# Patient Record
Sex: FEMALE | Race: WHITE | NOT HISPANIC OR LATINO | Employment: PART TIME | URBAN - METROPOLITAN AREA
[De-identification: names, ages, dates, MRNs, and addresses within clinical notes are randomized per-mention and may not be internally consistent; named-entity substitution may affect disease eponyms.]

---

## 2017-09-07 ENCOUNTER — HOSPITAL ENCOUNTER (EMERGENCY)
Facility: CLINIC | Age: 57
Discharge: HOME | End: 2017-09-07
Attending: PHYSICIAN ASSISTANT | Admitting: PHYSICIAN ASSISTANT
Payer: COMMERCIAL

## 2017-09-07 VITALS
DIASTOLIC BLOOD PRESSURE: 88 MMHG | OXYGEN SATURATION: 99 % | TEMPERATURE: 98.4 F | SYSTOLIC BLOOD PRESSURE: 177 MMHG | HEART RATE: 83 BPM | RESPIRATION RATE: 16 BRPM

## 2017-09-07 DIAGNOSIS — M62.830 BACK MUSCLE SPASM: ICD-10-CM

## 2017-09-07 DIAGNOSIS — R03.0 ELEVATED BLOOD PRESSURE READING WITHOUT DIAGNOSIS OF HYPERTENSION: ICD-10-CM

## 2017-09-07 PROCEDURE — 99283 EMERGENCY DEPT VISIT LOW MDM: CPT

## 2017-09-07 PROCEDURE — 25000132 ZZH RX MED GY IP 250 OP 250 PS 637: Performed by: PHYSICIAN ASSISTANT

## 2017-09-07 RX ORDER — OXYCODONE AND ACETAMINOPHEN 5; 325 MG/1; MG/1
2 TABLET ORAL ONCE
Status: COMPLETED | OUTPATIENT
Start: 2017-09-07 | End: 2017-09-07

## 2017-09-07 RX ORDER — OXYCODONE AND ACETAMINOPHEN 5; 325 MG/1; MG/1
1-2 TABLET ORAL EVERY 4 HOURS PRN
Qty: 15 TABLET | Refills: 0 | Status: SHIPPED | OUTPATIENT
Start: 2017-09-07 | End: 2019-02-20

## 2017-09-07 RX ORDER — METHOCARBAMOL 500 MG/1
1000 TABLET, FILM COATED ORAL 4 TIMES DAILY PRN
Qty: 40 TABLET | Refills: 0 | Status: SHIPPED | OUTPATIENT
Start: 2017-09-07 | End: 2017-09-12

## 2017-09-07 RX ORDER — METHOCARBAMOL 500 MG/1
1000 TABLET, FILM COATED ORAL ONCE
Status: COMPLETED | OUTPATIENT
Start: 2017-09-07 | End: 2017-09-07

## 2017-09-07 RX ADMIN — METHOCARBAMOL 1000 MG: 500 TABLET ORAL at 13:42

## 2017-09-07 RX ADMIN — OXYCODONE HYDROCHLORIDE AND ACETAMINOPHEN 2 TABLET: 5; 325 TABLET ORAL at 13:43

## 2017-09-07 ASSESSMENT — ENCOUNTER SYMPTOMS
BACK PAIN: 1
WEAKNESS: 0
NUMBNESS: 1

## 2017-09-07 NOTE — DISCHARGE INSTRUCTIONS
[facility] [place]  [facility]  [address]  [place] [address]  Phone: [phone]      Relieving Back Pain  Back pain is a common problem. You can strain back muscles by lifting too much weight or just by moving the wrong way. Back strain can be uncomfortable, even painful. And it can take weeks or months to improve. To help yourself feel better and prevent future back strains, try these tips.  Important Note: Do not give aspirin to children or teens without first discussing it with your healthcare provider.      ? Ice    Ice reduces muscle pain and swelling. It helps most during the first 24 to 48 hours after an injury.    Wrap an ice pack or a bag of frozen peas in a thin towel. (Never place ice directly on your skin.)    Place the ice where your back hurts the most.    Don t ice for more than 20 minutes at a time.    You can use ice several times a day.  ? Medicines  Over-the-counter pain relievers can include acetaminophen and anti-inflammatory medicines, which includes aspirin or ibuprofen. They can help ease discomfort. Some also reduce swelling.    Tell your healthcare provider about any medicines you are already taking.    Take medicines only as directed.  ? Heat  After the first 48 hours, heat can relax sore muscles and improve blood flow.    Try a warm bath or shower. Or use a heating pad set on low. To prevent a burn, keep a cloth between you and the heating pad.    Don t use a heating pad for more than 15 minutes at a time. Never sleep on a heating pad.  Date Last Reviewed: 9/1/2015 2000-2017 [employer]. [address] [place] [address], [place] [address]. All rights reserved. This information is not intended as a substitute for professional medical care. Always follow your healthcare professional's instructions.          Back Spasm (No Trauma)    Spasm of the back muscles can occur after a sudden forceful twisting or  bending force (such as in a car accident), after a simple awkward movement, or after lifting something heavy with poor body positioning. In any case, muscle spasm adds to the pain. Sleeping in an awkward position or on a poor quality mattress can also cause this. Some people respond to emotional stress by tensing the muscles of their back.  Pain that continues may need further evaluation or other types of treatment such as physical therapy.  You don't always need X-rays for the initial evaluation of back pain, unless you had a physical injury such as from a car accident or fall. If your pain continues and doesn't respond to medical treatment, X-rays and other tests may then be done.   Home care    As soon as possible, start sitting or walking again to avoid problems from prolonged bed rest (muscle weakness, worsening back stiffness and pain, blood clots in the legs).    When in bed, try to find a position of comfort. A firm mattress is best. Try lying flat on your back with pillows under your knees. You can also try lying on your side with your knees bent up toward your chest and a pillow between your knees.    Avoid prolonged sitting, long car rides, or travel. This puts more stress on the lower back than standing or walking.     During the first 24 to 72 hours after an injury or flare-up, apply an ice pack to the painful area for 20 minutes, then remove it for 20 minutes. Do this over a period of 60 to 90 minutes or several times a day. This will reduce swelling and pain. Always wrap ice packs in a thin towel.    You can start with ice, then switch to heat. Heat (hot shower, hot bath, or heating pad) reduces pain, and works well for muscle spasms. Apply heat to the painful area for 20 minutes, then remove it for 20 minutes. Do this over a period of 60 to 90 minutes or several times a day. Do not sleep on a heating pad as it can burn or damage skin.    Alternate ice and heat therapies.    Be aware of safe lifting  methods and do not lift anything over 15 pounds until all the pain is gone.  Gentle stretching will help your back heal faster. Do this simple routine 2 to 3 times a day until your back is feeling better.    Lie on your back with your knees bent and both feet on the ground    Slowly raise your left knee to your chest as you flatten your lower back against the floor. Hold for 20 to 30 seconds.    Relax and repeat the exercise with your right knee.    Do 2 to 3 of these exercises for each leg.    Repeat, hugging both knees to your chest at the same time.    Do not bounce, but use a gentle pull.  Medicines  Talk to your doctor before using medicine, especially if you have other medical problems or are taking other medicines.  You may use acetaminophen or ibuprofen to control pain, unless your healthcare provider prescribed another pain medicine. If you have a chronic condition such as diabetes, liver or kidney disease, stomach ulcer, or gastrointestinal bleeding, or are taking blood thinners, talk with your healthcare provider before taking any medicines.  Be careful if you are given prescription pain medicine, narcotics, or medicine for muscle spasm. They can cause drowsiness, affect your coordination, reflexes, or judgment. Do not drive or operate heavy machinery when taking these medicines. Take pain medicine only as prescribed by your healthcare provider.  Follow-up care  Follow up with your doctor, or as advised. Physical therapy or further tests may be needed.  If X-rays were taken, they may be reviewed by a radiologist. You will be notified of any new findings that may affect your care.  Call 911  Seek emergency medical care if any of these occur:    Trouble breathing    Confusion    Drowsiness or trouble awakening    Fainting or loss of consciousness    Rapid or very slow heart rate    Loss of bowel or bladder control  When to seek medical advice  Call your healthcare provider right away if any of these  occur:    Pain becomes worse or spreads to your legs    Weakness or numbness in one or both legs    Numbness in the groin or genital area    Unexplained fever over 100.4 F (38.0 C)    Burning or pain when passing urine  Date Last Reviewed: 6/1/2016 2000-2017 [employer]. [address] [place] [address]. All rights reserved. This information is not intended as a substitute for professional medical care. Always follow your healthcare professional's instructions.

## 2017-09-07 NOTE — ED NOTES
"Back pain. States has a history of the same. No known injury today. Has been intermittently worse over the past week. Left leg does have numbness and tingling \"it goes away right away though if I stand still\". Denies loss of bowel or bladder.   "

## 2017-09-07 NOTE — ED AVS SNAPSHOT
[facility]    [address] [facility] [address]    Phone:  [phone]    Fax:  [phone]                                       Ramila Dietrich   MRN: [id number]    Department:  [facility]   Date of Visit:  9/7/2017           Patient Information     Date Of Birth          1960        Your diagnoses for this visit were:     Back muscle spasm        You were seen by Kelly Franz PA-C.      Follow-up Information     Follow up with Ivonne Crowell.    Contact information:    [address] [place] [address]  [phone]          Follow up with [facility] In 3 days.    Specialties:  Sports Medicine, Pain Management, Obstetrics & Gynecology, Pediatrics, Internal Medicine, Nephrology    Why:  As needed    Contact information:    [address]  [facility] [id number] [phone]        Follow up with [facility].    Specialty:  EMERGENCY MEDICINE    Why:  If symptoms worsen    Contact information:    [address] [facility] [id number] [phone]        Discharge Instructions       [facility]  [address]  [place] [address]  Phone: [phone]      Relieving Back Pain  Back pain is a common problem. You can strain back muscles by lifting too much weight or just by moving the wrong way. Back strain can be uncomfortable, even painful. And it can take weeks or months to improve. To help yourself feel better and prevent future back strains, try these tips.  Important Note: Do not give aspirin to children or teens without first discussing it with your healthcare provider.      ? Ice    Ice reduces muscle pain and swelling. It helps most during the first 24 to 48 hours after an injury.    Wrap an ice  pack or a bag of frozen peas in a thin towel. (Never place ice directly on your skin.)    Place the ice where your back hurts the most.    Don t ice for more than 20 minutes at a time.    You can use ice several times a day.  ? Medicines  Over-the-counter pain relievers can include acetaminophen and anti-inflammatory medicines, which includes aspirin or ibuprofen. They can help ease discomfort. Some also reduce swelling.    Tell your healthcare provider about any medicines you are already taking.    Take medicines only as directed.  ? Heat  After the first 48 hours, heat can relax sore muscles and improve blood flow.    Try a warm bath or shower. Or use a heating pad set on low. To prevent a burn, keep a cloth between you and the heating pad.    Don t use a heating pad for more than 15 minutes at a time. Never sleep on a heating pad.  Date Last Reviewed: 9/1/2015 2000-2017 [employer]. [address] [place] [address]. All rights reserved. This information is not intended as a substitute for professional medical care. Always follow your healthcare professional's instructions.          Back Spasm (No Trauma)    Spasm of the back muscles can occur after a sudden forceful twisting or bending force (such as in a car accident), after a simple awkward movement, or after lifting something heavy with poor body positioning. In any case, muscle spasm adds to the pain. Sleeping in an awkward position or on a poor quality mattress can also cause this. Some people respond to emotional stress by tensing the muscles of their back.  Pain that continues may need further evaluation or other types of treatment such as physical therapy.  You don't always need X-rays for the initial evaluation of back pain, unless you had a physical injury such as from a car accident or fall. If your pain continues and doesn't respond to medical treatment, X-rays and other tests may then be done.   Home care    As soon as  possible, start sitting or walking again to avoid problems from prolonged bed rest (muscle weakness, worsening back stiffness and pain, blood clots in the legs).    When in bed, try to find a position of comfort. A firm mattress is best. Try lying flat on your back with pillows under your knees. You can also try lying on your side with your knees bent up toward your chest and a pillow between your knees.    Avoid prolonged sitting, long car rides, or travel. This puts more stress on the lower back than standing or walking.     During the first 24 to 72 hours after an injury or flare-up, apply an ice pack to the painful area for 20 minutes, then remove it for 20 minutes. Do this over a period of 60 to 90 minutes or several times a day. This will reduce swelling and pain. Always wrap ice packs in a thin towel.    You can start with ice, then switch to heat. Heat (hot shower, hot bath, or heating pad) reduces pain, and works well for muscle spasms. Apply heat to the painful area for 20 minutes, then remove it for 20 minutes. Do this over a period of 60 to 90 minutes or several times a day. Do not sleep on a heating pad as it can burn or damage skin.    Alternate ice and heat therapies.    Be aware of safe lifting methods and do not lift anything over 15 pounds until all the pain is gone.  Gentle stretching will help your back heal faster. Do this simple routine 2 to 3 times a day until your back is feeling better.    Lie on your back with your knees bent and both feet on the ground    Slowly raise your left knee to your chest as you flatten your lower back against the floor. Hold for 20 to 30 seconds.    Relax and repeat the exercise with your right knee.    Do 2 to 3 of these exercises for each leg.    Repeat, hugging both knees to your chest at the same time.    Do not bounce, but use a gentle pull.  Medicines  Talk to your doctor before using medicine, especially if you have other medical problems or are taking  other medicines.  You may use acetaminophen or ibuprofen to control pain, unless your healthcare provider prescribed another pain medicine. If you have a chronic condition such as diabetes, liver or kidney disease, stomach ulcer, or gastrointestinal bleeding, or are taking blood thinners, talk with your healthcare provider before taking any medicines.  Be careful if you are given prescription pain medicine, narcotics, or medicine for muscle spasm. They can cause drowsiness, affect your coordination, reflexes, or judgment. Do not drive or operate heavy machinery when taking these medicines. Take pain medicine only as prescribed by your healthcare provider.  Follow-up care  Follow up with your doctor, or as advised. Physical therapy or further tests may be needed.  If X-rays were taken, they may be reviewed by a radiologist. You will be notified of any new findings that may affect your care.  Call 911  Seek emergency medical care if any of these occur:    Trouble breathing    Confusion    Drowsiness or trouble awakening    Fainting or loss of consciousness    Rapid or very slow heart rate    Loss of bowel or bladder control  When to seek medical advice  Call your healthcare provider right away if any of these occur:    Pain becomes worse or spreads to your legs    Weakness or numbness in one or both legs    Numbness in the groin or genital area    Unexplained fever over 100.4 F (38.0 C)    Burning or pain when passing urine  Date Last Reviewed: 6/1/2016 2000-2017 [employer]. [address] [place], [place] [address]. All rights reserved. This information is not intended as a substitute for professional medical care. Always follow your healthcare professional's instructions.          24 Hour Appointment Hotline       To make an appointment at any [facility] clinic, call [phone] ([phone]). If you don't have a family doctor or clinic, we will help you find one. [facility] are  conveniently located to serve the needs of you and your family.             Review of your medicines      START taking        Dose / Directions Last dose taken    methocarbamol 500 MG tablet   Commonly known as:  ROBAXIN   Dose:  1000 mg   Quantity:  40 tablet        Take 2 tablets (1,000 mg) by mouth 4 times daily as needed for muscle spasms   Refills:  0        oxyCODONE-acetaminophen 5-325 MG per tablet   Commonly known as:  PERCOCET   Dose:  1-2 tablet   Quantity:  15 tablet        Take 1-2 tablets by mouth every 4 hours as needed for pain   Refills:  0          Our records show that you are taking the medicines listed below. If these are incorrect, please call your family doctor or clinic.        Dose / Directions Last dose taken    acetaminophen 500 MG tablet   Commonly known as:  TYLENOL   Dose:  1000 mg        Take 1,000 mg by mouth every 4 hours as needed for pain   Refills:  0        ADVIL 200 MG tablet   Dose:  400 mg   Generic drug:  ibuprofen        Take 400 mg by mouth once as needed for pain   Refills:  0        sucralfate 1 GM/10ML suspension   Commonly known as:  CARAFATE   Dose:  1 g   Quantity:  420 mL        Take 10 mLs (1 g) by mouth 4 times daily   Refills:  0        ZANTAC PO        Refills:  0                Prescriptions were sent or printed at these locations (2 Prescriptions)                   Other Prescriptions                Printed at Department/Unit printer (2 of 2)         oxyCODONE-acetaminophen (PERCOCET) 5-325 MG per tablet               methocarbamol (ROBAXIN) 500 MG tablet                Orders Needing Specimen Collection     None      Pending Results     No orders found from 9/5/2017 to 9/8/2017.            Pending Culture Results     No orders found from 9/5/2017 to 9/8/2017.            Pending Results Instructions     If you had any lab results that were not finalized at the time of your Discharge, you can call the ED Lab Result RN at [phone]. You will be contacted by  this team for any positive Lab results or changes in treatment. The nurses are available 7 days a week from 10A to 6:30P.  You can leave a message 24 hours per day and they will return your call.        Test Results From Your Hospital Stay               Clinical Quality Measure: Blood Pressure Screening     Your blood pressure was checked while you were in the emergency department today. The last reading we obtained was  BP: (!) 149/97 . Please read the guidelines below about what these numbers mean and what you should do about them.  If your systolic blood pressure (the top number) is less than 120 and your diastolic blood pressure (the bottom number) is less than 80, then your blood pressure is normal. There is nothing more that you need to do about it.  If your systolic blood pressure (the top number) is 120-139 or your diastolic blood pressure (the bottom number) is 80-89, your blood pressure may be higher than it should be. You should have your blood pressure rechecked within a year by a primary care provider.  If your systolic blood pressure (the top number) is 140 or greater or your diastolic blood pressure (the bottom number) is 90 or greater, you may have high blood pressure. High blood pressure is treatable, but if left untreated over time it can put you at risk for heart attack, stroke, or kidney failure. You should have your blood pressure rechecked by a primary care provider within the next 4 weeks.  If your provider in the emergency department today gave you specific instructions to follow-up with your doctor or provider even sooner than that, you should follow that instruction and not wait for up to 4 weeks for your follow-up visit.        Thank you for choosing [facility]       Thank you for choosing [facility] for your care. Our goal is always to provide you with excellent care. Hearing back from our patients is one way we can continue to improve our services. Please take a few minutes to complete the  "written survey that you may receive in the mail after you visit with us. Thank you!        [facility] Information     [employer] lets you send messages to your doctor, view your test results, renew your prescriptions, schedule appointments and more. To sign up, go to [email or web address] . Click on \"Log in\" on the left side of the screen, which will take you to the Welcome page. Then click on \"Sign up Now\" on the right side of the page.     You will be asked to enter the access code listed below, as well as some personal information. Please follow the directions to create your username and password.     Your access code is: [email or web address]  Expires: 2017  1:42 PM     Your access code will  in 90 days. If you need help or a new code, please call your [facility] clinic or [phone].        Care EveryWhere ID     This is your Care EveryWhere ID. This could be used by other organizations to access your [facility] medical records  [id number]        Equal Access to Services     ÁLVARO SCHAEFER : Hadbranden padgett Sofrank, waaxda lucastroadaha, qaybta kaalwade canales, tonie reynaga . So [facility] [phone].    ATENCIÓN: Si habla español, tiene a gamino disposición servicios gratuitos de asistencia lingüística. Llame al [phone].    We comply with applicable federal civil rights laws and [place] laws. We do not discriminate on the basis of race, color, national origin, age, disability sex, sexual orientation or gender identity.            After Visit Summary       This is your record. Keep this with you and show to your community pharmacist(s) and doctor(s) at your next visit.                  "

## 2017-09-07 NOTE — ED AVS SNAPSHOT
[facility]    [address]    [facility] [address]    Phone:  [phone]    Fax:  [phone]                                       Ramila Dietrich   MRN: [id number]    Department:  [facility]   Date of Visit:  9/7/2017           After Visit Summary Signature Page     I have received my discharge instructions, and my questions have been answered. I have discussed any challenges I see with this plan with the nurse or doctor.    ..........................................................................................................................................  Patient/Patient Representative Signature      ..........................................................................................................................................  Patient Representative Print Name and Relationship to Patient    ..................................................               ................................................  Date                                            Time    ..........................................................................................................................................  Reviewed by Signature/Title    ...................................................              ..............................................  Date                                                            Time

## 2017-09-07 NOTE — ED PROVIDER NOTES
History     Chief Complaint:  Back Pain    HPI   Ramila Dietrich is a 57 year old female, with a history of herniated disc, who presents with her  to the ED for evaluation of back pain. The patient reports she can take Aleve when she has an episode of back pain, and the pain would be relieved. However, the patient notes taking Motrin at 9:00AM and Aleve at 12:00PM today without alleviation of pain, prompting her visit to the ED. The patient notes her pain is unbearable with all movements and she has trouble finding a comfortable position. The patient reports her pain radiates down into her left thigh occasionally causing a numbness and tingling sensation. This is the same pain she has had in the past although this is a bit higher up than it was before (at that time it was in her lower back). The patient rates the pain as a 10/10. The patient denies any recent trauma, gait problems, weakness, or urinary/bladder incontinence. Of note, the patient reports never seeing a specialist for her herniated disc although would like to.    CT Lumbar Spine w/o Contrast, 10/20/16  IMPRESSION: L4-L5 left foraminal/lateral recess disc protrusion or  herniation resulting in moderate left L4 foraminal stenosis and  asymmetrical narrowing of the left lateral recess which could also  result in compression of the descending left L5 nerve root.    Kalpesh Ovalle MD    Allergies:  Aminophylline  Erythromycin    Medications:    RaNITidine HCl (ZANTAC PO)   sucralfate (CARAFATE) 1 GM/10ML suspension   ibuprofen (ADVIL) 200 MG tablet   acetaminophen (TYLENOL) 500 MG tablet     Past Medical History:    Gastroenteritis  Asthma  Tobacco abuse  Hemorrhagic colitis  Acid reflux  Hiatal hernia  Herniated disc    Past Surgical History:    Appendectomy   x5  Orthopedic surgery  Tonsillectomy    Family History:    Heart disease  MI  Lung cancer  MS    Social History:  Smoking status: Current every day smoker  Alcohol use: No  Presents  to ED with boyfriend   Marital Status:  Single [1]     Review of Systems   Musculoskeletal: Positive for back pain. Negative for gait problem.   Neurological: Positive for numbness. Negative for weakness.   All other systems reviewed and are negative.    Physical Exam     Patient Vitals for the past 24 hrs:   BP Temp Temp src Heart Rate Resp SpO2   09/07/17 1318 (!) 149/97 98.4  F (36.9  C) Temporal 91 16 99 %     Physical Exam  Constitutional: Alert, attentive  CV: regular rate and rhythm  Chest: Effort normal and breath sounds normal.   GI:  There is no tenderness. No distension. Normal bowel sounds  MSK: Normal range of motion of extremities. There is mild tenderness to the mid-upper thoracic paraspinal musculature without midline spinal tenderness. No pain on palpation of the lower back musculature.   Neurological: Alert, attentive  5/5 strength to the DF, PF, EHL and FHL motor functions; sensation intact to the DP, SP, T, S and S distributions  Skin: Skin is warm and dry.  No skin changes in the areas of pain to the back.     Emergency Department Course   Interventions:  1342: Robaxin 1,000mg Oral   1343: Percocet 5-325mg 2 tablets Oral     Emergency Department Course:  Past medical records, nursing notes, and vitals reviewed.  1324: I performed an exam of the patient and obtained history, as documented above.    Findings and plan explained to the Patient and boyfriend. Patient discharged home with instructions regarding supportive care, medications, and reasons to return. The importance of close follow-up was reviewed.     Impression & Plan      Medical Decision Making:  Ramila Dietrich is a 57 year old female who presents for evaluation of back pain. She has a history of back pain in the past.  Pain has improved with interventions in the emergency department. The patient did not sustain any trauma, therefore x-rays are not necessary due to the low likelihood of fracture or subluxation.  No red flag symptoms to  suggest CT and/or MRI is indicated at this point.  There is no clinical evidence of cauda equina syndrome, discitis, spinal/epidural space hematoma or epidural abscess or other worrisome etiology. The neurological exam is normal and the patient's symptoms seem consistent with musculoskeletal issues and significant muscle spasm.   The patient will be discharged with pain medications to use as directed. Ice or heat to the back and stretching exercises. No heavy lifting, bending or twisting. Return if increasing pain, numbness, weakness, or bowel or bladder dysfunction. She was advised to schedule follow-up with her primary doctor within 3 days to re-assess symptoms and discuss elevated blood pressure which she states she has had in the past but has never seen PCP for blood pressure. Also requested spine doctor referral information which was given.     Diagnosis:    ICD-10-CM   1. Back muscle spasm M62.830     Disposition: Patient discharged to home with      Discharge Medications:  New Prescriptions    METHOCARBAMOL (ROBAXIN) 500 MG TABLET    Take 2 tablets (1,000 mg) by mouth 4 times daily as needed for muscle spasms    OXYCODONE-ACETAMINOPHEN (PERCOCET) 5-325 MG PER TABLET    Take 1-2 tablets by mouth every 4 hours as needed for pain     Venus Gonzalez  9/7/2017   [facility] EMERGENCY DEPARTMENT    I, Venus Gonzalez, am serving as a scribe at 1:24 PM on 9/7/2017 to document services personally performed by Kelly Franz PA-C based on my observations and the provider's statements to me.        Kelly Farnz PA-C  09/07/17 3716

## 2017-12-19 ENCOUNTER — HOSPITAL ENCOUNTER (EMERGENCY)
Facility: CLINIC | Age: 57
Discharge: HOME | End: 2017-12-19
Attending: EMERGENCY MEDICINE | Admitting: EMERGENCY MEDICINE
Payer: COMMERCIAL

## 2017-12-19 VITALS
DIASTOLIC BLOOD PRESSURE: 95 MMHG | WEIGHT: 100 LBS | SYSTOLIC BLOOD PRESSURE: 152 MMHG | HEART RATE: 88 BPM | RESPIRATION RATE: 16 BRPM | TEMPERATURE: 97.6 F | OXYGEN SATURATION: 97 % | BODY MASS INDEX: 18.29 KG/M2

## 2017-12-19 DIAGNOSIS — M54.50 ACUTE MIDLINE LOW BACK PAIN WITHOUT SCIATICA: ICD-10-CM

## 2017-12-19 PROCEDURE — 25000132 ZZH RX MED GY IP 250 OP 250 PS 637: Performed by: EMERGENCY MEDICINE

## 2017-12-19 PROCEDURE — 99283 EMERGENCY DEPT VISIT LOW MDM: CPT

## 2017-12-19 RX ORDER — HYDROCODONE BITARTRATE AND ACETAMINOPHEN 5; 325 MG/1; MG/1
2 TABLET ORAL ONCE
Status: COMPLETED | OUTPATIENT
Start: 2017-12-19 | End: 2017-12-19

## 2017-12-19 RX ORDER — CYCLOBENZAPRINE HCL 10 MG
10 TABLET ORAL 3 TIMES DAILY PRN
Qty: 15 TABLET | Refills: 0 | Status: SHIPPED | OUTPATIENT
Start: 2017-12-19 | End: 2019-02-20

## 2017-12-19 RX ADMIN — HYDROCODONE BITARTRATE AND ACETAMINOPHEN 2 TABLET: 5; 325 TABLET ORAL at 19:22

## 2017-12-19 ASSESSMENT — ENCOUNTER SYMPTOMS
NAUSEA: 1
BACK PAIN: 1
FEVER: 0
WEAKNESS: 0
MYALGIAS: 0
NUMBNESS: 0
NECK PAIN: 0
WOUND: 0
VOMITING: 0

## 2017-12-19 NOTE — ED AVS SNAPSHOT
[facility] Emergency Department    [address]    [facility] [address]    Phone:  [phone]    Fax:  [phone]                                       Ramila Dietrich   MRN: [id number]    Department:  [facility] Emergency Department   Date of Visit:  12/19/2017           After Visit Summary Signature Page     I have received my discharge instructions, and my questions have been answered. I have discussed any challenges I see with this plan with the nurse or doctor.    ..........................................................................................................................................  Patient/Patient Representative Signature      ..........................................................................................................................................  Patient Representative Print Name and Relationship to Patient    ..................................................               ................................................  Date                                            Time    ..........................................................................................................................................  Reviewed by Signature/Title    ...................................................              ..............................................  Date                                                            Time

## 2017-12-19 NOTE — ED AVS SNAPSHOT
[facility] Emergency Department    [address] [facility] [address]    Phone:  [phone]    Fax:  [phone]                                       Ramila Dietrich   MRN: [id number]    Department:  [facility] Emergency Department   Date of Visit:  12/19/2017           Patient Information     Date Of Birth          1960        Your diagnoses for this visit were:     Acute midline low back pain without sciatica        You were seen by Austin Miranda MD.      Follow-up Information     Follow up with Clinic, [facility]. Schedule an appointment as soon as possible for a visit in 3 days.    Contact information:    [address] [place] [address]  [phone]          Discharge Instructions       Discharge Instructions  Back Pain  You were seen today for back pain. Back pain can have many causes, but most will get better without surgery or other specific treatment. Sometimes there is a herniated ( slipped ) disc. We do not usually do MRI scans to look for these right away, since most herniated discs will get better on their own with time.  Today, we did not find any evidence that your back pain was caused by a serious condition. However, sometimes symptoms develop over time and cannot be found during an emergency visit, so it is very important that you follow up with your primary provider.  Generally, every Emergency Department visit should have a follow-up clinic visit with either a primary or a specialty clinic/provider. Please follow-up as instructed by your emergency provider today.    Return to the Emergency Department if:    You develop a fever with your back pain.     You have weakness or change in sensation in one or both legs.    You lose control of your bowels or bladder, or cannot empty your bladder (cannot pee).    Your pain gets much worse.     Follow-up with your provider:    Unless your pain has completely gone away, please  make an appointment with your provider within one week. Most of the routine care for back pain is available in a clinic and not the Emergency Department. You may need further management of your back pain, such as more pain medication, imaging such as an X-ray or MRI, or physical therapy.    What can I do to help myself?    Remain Active -- People are often afraid that they will hurt their back further or delay recovery by remaining active, but this is one of the best things you can do for your back. In fact, staying in bed for a long time to rest is not recommended. Studies have shown that people with low back pain recover faster when they remain active. Movement helps to bring blood flow to the muscles and relieve muscle spasms as well as preventing loss of muscle strength.    Heat -- Using a heating pad can help with low back pain during the first few weeks. Do not sleep with a heating pad, as you can be burned.     Pain medications - You may take a pain medication such as Tylenol  (acetaminophen), Advil , Motrin  (ibuprofen) or Aleve  (naproxen).  If you were given a prescription for medicine here today, be sure to read all of the information (including the package insert) that comes with your prescription.  This will include important information about the medicine, its side effects, and any warnings that you need to know about.  The pharmacist who fills the prescription can provide more information and answer questions you may have about the medicine.  If you have questions or concerns that the pharmacist cannot address, please call or return to the Emergency Department.   Remember that you can always come back to the Emergency Department if you are not able to see your regular provider in the amount of time listed above, if you get any new symptoms, or if there is anything that worries you.  Discharge Instructions  Hypertension - High Blood Pressure    During you visit to the Emergency Department, your blood  pressure was higher than the recommended blood pressure.  This may be related to stress, pain, medication or other temporary conditions. In these cases, your blood pressure may return to normal on its own. If you have a history of high blood pressure, you may need to have your provider adjust your medications. Sometimes, your high measurement here may indicate that you have developed high blood pressure that will stay high unless it is treated. As a general rule, high blood pressure causes problems over years rather than days, weeks, or months. So, while it is important to treat blood pressure, it is rarely important to treat blood pressure immediately. Occasionally we will begin a medication in the Emergency Department; more often we will recommend close follow-up for medications with a primary doctor/clinic.    Generally, every Emergency Department visit should have a follow-up clinic visit with either a primary or a specialty clinic/provider. Please follow-up as instructed by your emergency provider today.    Return to the Emergency Department if you start to have:    A severe headache.    Chest pain.    Shortness of breath.    Weakness or numbness that affects one part of the body.    Confusion.    Vision changes.    Significant swelling of legs and/or eyes.    A reaction to any medication started in the Emergency Department.    What can I do to help myself?    Avoid alcohol.    Take any blood pressure medicine that you are prescribed.    Get a good night s sleep.    Lower your salt intake.    Exercise.    Lose weight.    Manage stress.    See your doctor regularly    If blood pressure medication was started in the Emergency Department:    The medicine may not have an immediate effect. The body and brain determine what blood pressure you have. The medicine s job is to retrain the body s  thermostat  to a lower blood pressure.    You will need to follow up with your provider to see how this medicine is working  for you.  If you were given a prescription for medicine here today, be sure to read all of the information (including the package insert) that comes with your prescription.  This will include important information about the medicine, its side effects, and any warnings that you need to know about.  The pharmacist who fills the prescription can provide more information and answer questions you may have about the medicine.  If you have questions or concerns that the pharmacist cannot address, please call or return to the Emergency Department.   Remember that you can always come back to the Emergency Department if you are not able to see your regular provider in the amount of time listed above, if you get any new symptoms, or if there is anything that worries you.      24 Hour Appointment Hotline       To make an appointment at any [facility], call [phone] ([phone]). If you don't have a family doctor or clinic, we will help you find one. [facility] clinics are conveniently located to serve the needs of you and your family.             Review of your medicines      START taking        Dose / Directions Last dose taken    cyclobenzaprine 10 MG tablet   Commonly known as:  FLEXERIL   Dose:  10 mg   Quantity:  15 tablet        Take 1 tablet (10 mg) by mouth 3 times daily as needed for muscle spasms   Refills:  0          Our records show that you are taking the medicines listed below. If these are incorrect, please call your family doctor or clinic.        Dose / Directions Last dose taken    acetaminophen 500 MG tablet   Commonly known as:  TYLENOL   Dose:  1000 mg        Take 1,000 mg by mouth every 4 hours as needed for pain   Refills:  0        ADVIL 200 MG tablet   Dose:  400 mg   Generic drug:  ibuprofen        Take 400 mg by mouth once as needed for pain   Refills:  0        oxyCODONE-acetaminophen 5-325 MG per tablet   Commonly known as:  PERCOCET   Dose:  1-2 tablet   Quantity:  15 tablet         Take 1-2 tablets by mouth every 4 hours as needed for pain   Refills:  0        sucralfate 1 GM/10ML suspension   Commonly known as:  CARAFATE   Dose:  1 g   Quantity:  420 mL        Take 10 mLs (1 g) by mouth 4 times daily   Refills:  0        ZANTAC PO        Refills:  0                Prescriptions were sent or printed at these locations (1 Prescription)                   Other Prescriptions                Printed at Department/Unit printer (1 of 1)         cyclobenzaprine (FLEXERIL) 10 MG tablet                Orders Needing Specimen Collection     None      Pending Results     No orders found from 12/17/2017 to 12/20/2017.            Pending Culture Results     No orders found from 12/17/2017 to 12/20/2017.            Pending Results Instructions     If you had any lab results that were not finalized at the time of your Discharge, you can call the ED Lab Result RN at [phone]. You will be contacted by this team for any positive Lab results or changes in treatment. The nurses are available 7 days a week from 10A to 6:30P.  You can leave a message 24 hours per day and they will return your call.        Test Results From Your Hospital Stay               Clinical Quality Measure: Blood Pressure Screening     Your blood pressure was checked while you were in the emergency department today. The last reading we obtained was  BP: (!) 152/95 . Please read the guidelines below about what these numbers mean and what you should do about them.  If your systolic blood pressure (the top number) is less than 120 and your diastolic blood pressure (the bottom number) is less than 80, then your blood pressure is normal. There is nothing more that you need to do about it.  If your systolic blood pressure (the top number) is 120-139 or your diastolic blood pressure (the bottom number) is 80-89, your blood pressure may be higher than it should be. You should have your blood pressure rechecked within a year by a primary care  "provider.  If your systolic blood pressure (the top number) is 140 or greater or your diastolic blood pressure (the bottom number) is 90 or greater, you may have high blood pressure. High blood pressure is treatable, but if left untreated over time it can put you at risk for heart attack, stroke, or kidney failure. You should have your blood pressure rechecked by a primary care provider within the next 4 weeks.  If your provider in the emergency department today gave you specific instructions to follow-up with your doctor or provider even sooner than that, you should follow that instruction and not wait for up to 4 weeks for your follow-up visit.        Thank you for choosing [facility]       Thank you for choosing [facility] for your care. Our goal is always to provide you with excellent care. Hearing back from our patients is one way we can continue to improve our services. Please take a few minutes to complete the written survey that you may receive in the mail after you visit with us. Thank you!        [employer] Information     [email or web address] lets you send messages to your doctor, view your test results, renew your prescriptions, schedule appointments and more. To sign up, go to [email or web address] . Click on \"Log in\" on the left side of the screen, which will take you to the Welcome page. Then click on \"Sign up Now\" on the right side of the page.     You will be asked to enter the access code listed below, as well as some personal information. Please follow the directions to create your username and password.     Your access code is: [email or web address]  Expires: 3/19/2018  7:30 PM     Your access code will  in 90 days. If you need help or a new code, please call your [facility] clinic or [phone].        Care EveryWhere ID     This is your Care EveryWhere ID. This could be used by other organizations to access your [facility] medical records  [id number]        Equal Access to Services     ÁLVARO ETIENNE: Salbador nur" dayron Hoskins, bandar bowers, leobardo canales, tnoie mckeon. So [facility] [phone].    ATENCIÓN: Si habla español, tiene a gamino disposición servicios gratuitos de asistencia lingüística. Llame al [phone].    We comply with applicable federal civil rights laws and [place] laws. We do not discriminate on the basis of race, color, national origin, age, disability, sex, sexual orientation, or gender identity.            After Visit Summary       This is your record. Keep this with you and show to your community pharmacist(s) and doctor(s) at your next visit.

## 2017-12-20 NOTE — DISCHARGE INSTRUCTIONS
Discharge Instructions  Back Pain  You were seen today for back pain. Back pain can have many causes, but most will get better without surgery or other specific treatment. Sometimes there is a herniated ( slipped ) disc. We do not usually do MRI scans to look for these right away, since most herniated discs will get better on their own with time.  Today, we did not find any evidence that your back pain was caused by a serious condition. However, sometimes symptoms develop over time and cannot be found during an emergency visit, so it is very important that you follow up with your primary provider.  Generally, every Emergency Department visit should have a follow-up clinic visit with either a primary or a specialty clinic/provider. Please follow-up as instructed by your emergency provider today.    Return to the Emergency Department if:    You develop a fever with your back pain.     You have weakness or change in sensation in one or both legs.    You lose control of your bowels or bladder, or cannot empty your bladder (cannot pee).    Your pain gets much worse.     Follow-up with your provider:    Unless your pain has completely gone away, please make an appointment with your provider within one week. Most of the routine care for back pain is available in a clinic and not the Emergency Department. You may need further management of your back pain, such as more pain medication, imaging such as an X-ray or MRI, or physical therapy.    What can I do to help myself?    Remain Active -- People are often afraid that they will hurt their back further or delay recovery by remaining active, but this is one of the best things you can do for your back. In fact, staying in bed for a long time to rest is not recommended. Studies have shown that people with low back pain recover faster when they remain active. Movement helps to bring blood flow to the muscles and relieve muscle spasms as well as preventing loss of muscle  strength.    Heat -- Using a heating pad can help with low back pain during the first few weeks. Do not sleep with a heating pad, as you can be burned.     Pain medications - You may take a pain medication such as Tylenol  (acetaminophen), Advil , Motrin  (ibuprofen) or Aleve  (naproxen).  If you were given a prescription for medicine here today, be sure to read all of the information (including the package insert) that comes with your prescription.  This will include important information about the medicine, its side effects, and any warnings that you need to know about.  The pharmacist who fills the prescription can provide more information and answer questions you may have about the medicine.  If you have questions or concerns that the pharmacist cannot address, please call or return to the Emergency Department.   Remember that you can always come back to the Emergency Department if you are not able to see your regular provider in the amount of time listed above, if you get any new symptoms, or if there is anything that worries you.  Discharge Instructions  Hypertension - High Blood Pressure    During you visit to the Emergency Department, your blood pressure was higher than the recommended blood pressure.  This may be related to stress, pain, medication or other temporary conditions. In these cases, your blood pressure may return to normal on its own. If you have a history of high blood pressure, you may need to have your provider adjust your medications. Sometimes, your high measurement here may indicate that you have developed high blood pressure that will stay high unless it is treated. As a general rule, high blood pressure causes problems over years rather than days, weeks, or months. So, while it is important to treat blood pressure, it is rarely important to treat blood pressure immediately. Occasionally we will begin a medication in the Emergency Department; more often we will recommend close follow-up  for medications with a primary doctor/clinic.    Generally, every Emergency Department visit should have a follow-up clinic visit with either a primary or a specialty clinic/provider. Please follow-up as instructed by your emergency provider today.    Return to the Emergency Department if you start to have:    A severe headache.    Chest pain.    Shortness of breath.    Weakness or numbness that affects one part of the body.    Confusion.    Vision changes.    Significant swelling of legs and/or eyes.    A reaction to any medication started in the Emergency Department.    What can I do to help myself?    Avoid alcohol.    Take any blood pressure medicine that you are prescribed.    Get a good night s sleep.    Lower your salt intake.    Exercise.    Lose weight.    Manage stress.    See your doctor regularly    If blood pressure medication was started in the Emergency Department:    The medicine may not have an immediate effect. The body and brain determine what blood pressure you have. The medicine s job is to retrain the body s  thermostat  to a lower blood pressure.    You will need to follow up with your provider to see how this medicine is working for you.  If you were given a prescription for medicine here today, be sure to read all of the information (including the package insert) that comes with your prescription.  This will include important information about the medicine, its side effects, and any warnings that you need to know about.  The pharmacist who fills the prescription can provide more information and answer questions you may have about the medicine.  If you have questions or concerns that the pharmacist cannot address, please call or return to the Emergency Department.   Remember that you can always come back to the Emergency Department if you are not able to see your regular provider in the amount of time listed above, if you get any new symptoms, or if there is anything that worries you.

## 2017-12-20 NOTE — ED PROVIDER NOTES
History     Chief Complaint:  Back Pain    HPI   Ramila Dietrich is a 57 year old female with a history of acid reflux and asthma who presents with back pain. The patient states she first noticed the back pain about 6 days ago. She notes it first came on all by itself and she did not experience any trauma, fall or injury to the back. The patient reports it was getting better until today when she twisted wrong while walking. Since then, the notes the pain has been worsening and she has been having muscle spasms. She describes the pain as constant and sharp across the entirety of her lower back. She states it does not radiate into her upper back or legs. The patient notes she does have a bulging disc in the middle of her back, which she has been seen for in the past, but states she has never had issues with her lower back until now. She was also feeling nauseous earlier today because of the pain. She states her symptoms are not alleviated with Aleve and Ibuprofen at home. The patient denies any fevers, vomiting, history of diabetes, cancer, or dialysis, stool or urine incontinence, or blood thinner or IV drug use.     Allergies:  Aminophylline  Erythromycin     Medications:    Zantac  Carafate  Ibuprofen  Tylenol    Past Medical History:    Acid reflux  Asthma  Hiatal hernia    Past Surgical History:    Appendectomy  Cesarea section x5  Orthopedic surgery  Tonsillectomy    Family History:    Heart disease with myocardial infarction  Emphysema  Lung cancer  Multiple sclerosis    Social History:  Smoking status: Yes, 1 pack per day  Alcohol use: No  PCP: Ivonne Jett [facility]  Marital Status: Single [1]     Review of Systems   Constitutional: Negative for fever.   Gastrointestinal: Positive for nausea. Negative for vomiting.   Musculoskeletal: Positive for back pain. Negative for myalgias and neck pain.   Skin: Negative for wound.   Neurological: Negative for weakness and numbness.   All other systems reviewed and are  negative.    Physical Exam     Patient Vitals for the past 24 hrs:   BP Temp Pulse Resp SpO2 Weight   12/19/17 1733 (!) 167/107 97.6  F (36.4  C) 118 20 97 % 45.4 kg (100 lb)     Physical Exam    General:  Pleasant, appears older than stated age  HEENT:   Conjunctiva are normal and without erythema.    NECK:   Supple, no meningismus.     CV:    Regular rate and rhythm     No murmurs, rubs or gallops.      2+ dorsalis pedis pulses bilateral.  PULM:   Clear to auscultation bilateral.      No respiratory distress.  No stridor.  ABD:   Soft, non-tender, non-distendend.      No rebound or guarding.  MSK:   Patient is moderate tenderness over the lumbar spine and bilateral lumbar paraspinal musculature.      No step-off to the bony spine or overlying lesions.   LYMPH:  No cervical lymphadenopathy.  NEURO:  Strength is 5/5 and sensation is intact to the lower extremities bilateral.        2+ patellar tendon reflexes bilateral.      No clonus and down-going Babinski bilateral.    Negative straight leg raise bilateral.  SKIN:   Warm, dry and intact.    PYSCH:   Mood is good and affect is appropriate.      Emergency Department Course   Interventions:  1922: 650 mg Norco PO    Emergency Department Course:  Past medical records, nursing notes, and vitals reviewed.  1902: I performed an exam of the patient and obtained history, as documented above.    I rechecked the patient. Findings and plan explained to the patient. Patient discharged home with instructions regarding supportive care, medications, and reasons to return. The importance of close follow-up was reviewed.     Impression & Plan      Medical Decision Making:    Patient was seen in the ED for back pain today.   We considered the following pathologies as a source for the pain:    Cauda equina syndrome: The patient has no history or exam findings of saddle anesthesia, urinary/stool incontinence, active malignancy, loss of sensation or strength, and has no neurologic  impairment on physical examination.    Epidural abscess, hematoma and discitis:  There has been no recent surgical intervention, lumbar puncture or invasive procedures to the spine.  Patient is afebrile, non-diabetic, not on dialysis, has no significant night pain and denies prior IV drug abuse.    Fracture of the bony spinal column: No history of trauma and no concern for spontaneous compression fracture.    It appears that the patient's pain is most suggestive of muscular strain with spasm and less likely disc herniation or nerve impingement.  Patient will be referred back to their primary physician for further management of their pain and was counseled that if they will need refills of their pain medications, it will have to come from their primary provider and not the ED.  Patient was also encouraged to focus on strengthening the low back muscles, increased flexibility and weight loss to further prevent further back injury.  Patient instructed to return to the ED for increased pain, fever, loss of bowel/bladder control, new loss of sensation or strength, failure to improve or any other concerns.        Diagnosis:    ICD-10-CM   1. Acute midline low back pain without sciatica M54.5     Disposition: Discharged to home    Discharge Medications:  New Prescriptions    CYCLOBENZAPRINE (FLEXERIL) 10 MG TABLET    Take 1 tablet (10 mg) by mouth 3 times daily as needed for muscle spasms     Melany Damon  12/19/2017   [facility] EMERGENCY DEPARTMENT    I, Melany Damon, am serving as a scribe at 7:02 PM on 12/19/2017 to document services personally performed by Austin Miranda MD based on my observations and the provider's statements to me.        Austin Miranda MD  12/20/17 1055

## 2019-02-20 ENCOUNTER — HOSPITAL ENCOUNTER (EMERGENCY)
Facility: CLINIC | Age: 59
Discharge: HOME | End: 2019-02-20
Attending: EMERGENCY MEDICINE | Admitting: EMERGENCY MEDICINE
Payer: COMMERCIAL

## 2019-02-20 ENCOUNTER — APPOINTMENT (OUTPATIENT)
Dept: GENERAL RADIOLOGY | Facility: CLINIC | Age: 59
End: 2019-02-20
Attending: EMERGENCY MEDICINE
Payer: COMMERCIAL

## 2019-02-20 VITALS
SYSTOLIC BLOOD PRESSURE: 158 MMHG | TEMPERATURE: 98.2 F | OXYGEN SATURATION: 97 % | RESPIRATION RATE: 18 BRPM | DIASTOLIC BLOOD PRESSURE: 90 MMHG

## 2019-02-20 DIAGNOSIS — R06.00 DYSPNEA, UNSPECIFIED TYPE: ICD-10-CM

## 2019-02-20 DIAGNOSIS — R07.9 CHEST PAIN, UNSPECIFIED TYPE: ICD-10-CM

## 2019-02-20 LAB
ANION GAP SERPL CALCULATED.3IONS-SCNC: 10 MMOL/L (ref 3–14)
BASOPHILS # BLD AUTO: 0.1 10E9/L (ref 0–0.2)
BASOPHILS NFR BLD AUTO: 1 %
BUN SERPL-MCNC: 9 MG/DL (ref 7–30)
CALCIUM SERPL-MCNC: 9.5 MG/DL (ref 8.5–10.1)
CHLORIDE SERPL-SCNC: 106 MMOL/L (ref 94–109)
CO2 SERPL-SCNC: 23 MMOL/L (ref 20–32)
CREAT SERPL-MCNC: 0.62 MG/DL (ref 0.52–1.04)
D DIMER PPP FEU-MCNC: 0.3 UG/ML FEU (ref 0–0.5)
DIFFERENTIAL METHOD BLD: ABNORMAL
EOSINOPHIL # BLD AUTO: 0.1 10E9/L (ref 0–0.7)
EOSINOPHIL NFR BLD AUTO: 1 %
ERYTHROCYTE [DISTWIDTH] IN BLOOD BY AUTOMATED COUNT: 12.5 % (ref 10–15)
GFR SERPL CREATININE-BSD FRML MDRD: >90 ML/MIN/{1.73_M2}
GLUCOSE SERPL-MCNC: 98 MG/DL (ref 70–99)
HCT VFR BLD AUTO: 42.1 % (ref 35–47)
HGB BLD-MCNC: 14.6 G/DL (ref 11.7–15.7)
IMM GRANULOCYTES # BLD: 0 10E9/L (ref 0–0.4)
IMM GRANULOCYTES NFR BLD: 0.3 %
LYMPHOCYTES # BLD AUTO: 4.2 10E9/L (ref 0.8–5.3)
LYMPHOCYTES NFR BLD AUTO: 33.7 %
MCH RBC QN AUTO: 32.1 PG (ref 26.5–33)
MCHC RBC AUTO-ENTMCNC: 34.7 G/DL (ref 31.5–36.5)
MCV RBC AUTO: 93 FL (ref 78–100)
MONOCYTES # BLD AUTO: 0.7 10E9/L (ref 0–1.3)
MONOCYTES NFR BLD AUTO: 5.9 %
NEUTROPHILS # BLD AUTO: 7.3 10E9/L (ref 1.6–8.3)
NEUTROPHILS NFR BLD AUTO: 58.1 %
NRBC # BLD AUTO: 0 10*3/UL
NRBC BLD AUTO-RTO: 0 /100
NT-PROBNP SERPL-MCNC: 157 PG/ML (ref 0–900)
PLATELET # BLD AUTO: 319 10E9/L (ref 150–450)
POTASSIUM SERPL-SCNC: 3.8 MMOL/L (ref 3.4–5.3)
RBC # BLD AUTO: 4.55 10E12/L (ref 3.8–5.2)
SODIUM SERPL-SCNC: 139 MMOL/L (ref 133–144)
TROPONIN I SERPL-MCNC: <0.015 UG/L (ref 0–0.04)
TROPONIN I SERPL-MCNC: <0.015 UG/L (ref 0–0.04)
WBC # BLD AUTO: 12.6 10E9/L (ref 4–11)

## 2019-02-20 PROCEDURE — 83880 ASSAY OF NATRIURETIC PEPTIDE: CPT | Performed by: EMERGENCY MEDICINE

## 2019-02-20 PROCEDURE — 93005 ELECTROCARDIOGRAM TRACING: CPT

## 2019-02-20 PROCEDURE — 94640 AIRWAY INHALATION TREATMENT: CPT

## 2019-02-20 PROCEDURE — 80048 BASIC METABOLIC PNL TOTAL CA: CPT | Performed by: EMERGENCY MEDICINE

## 2019-02-20 PROCEDURE — 25000132 ZZH RX MED GY IP 250 OP 250 PS 637: Performed by: EMERGENCY MEDICINE

## 2019-02-20 PROCEDURE — 25000128 H RX IP 250 OP 636: Performed by: EMERGENCY MEDICINE

## 2019-02-20 PROCEDURE — 36415 COLL VENOUS BLD VENIPUNCTURE: CPT | Performed by: EMERGENCY MEDICINE

## 2019-02-20 PROCEDURE — 25000125 ZZHC RX 250: Performed by: EMERGENCY MEDICINE

## 2019-02-20 PROCEDURE — 84484 ASSAY OF TROPONIN QUANT: CPT | Performed by: EMERGENCY MEDICINE

## 2019-02-20 PROCEDURE — 96374 THER/PROPH/DIAG INJ IV PUSH: CPT

## 2019-02-20 PROCEDURE — 71046 X-RAY EXAM CHEST 2 VIEWS: CPT

## 2019-02-20 PROCEDURE — 99285 EMERGENCY DEPT VISIT HI MDM: CPT | Mod: 25

## 2019-02-20 PROCEDURE — 85025 COMPLETE CBC W/AUTO DIFF WBC: CPT | Performed by: EMERGENCY MEDICINE

## 2019-02-20 PROCEDURE — 85379 FIBRIN DEGRADATION QUANT: CPT | Performed by: EMERGENCY MEDICINE

## 2019-02-20 RX ORDER — TRAMADOL HYDROCHLORIDE 50 MG/1
50-100 TABLET ORAL EVERY 6 HOURS PRN
Qty: 10 TABLET | Refills: 0 | Status: SHIPPED | OUTPATIENT
Start: 2019-02-20 | End: 2019-02-23

## 2019-02-20 RX ORDER — ALBUTEROL SULFATE 90 UG/1
2 AEROSOL, METERED RESPIRATORY (INHALATION) EVERY 6 HOURS PRN
Qty: 1 INHALER | Refills: 0 | Status: SHIPPED | OUTPATIENT
Start: 2019-02-20 | End: 2019-10-29

## 2019-02-20 RX ORDER — IPRATROPIUM BROMIDE AND ALBUTEROL SULFATE 2.5; .5 MG/3ML; MG/3ML
3 SOLUTION RESPIRATORY (INHALATION) ONCE
Status: COMPLETED | OUTPATIENT
Start: 2019-02-20 | End: 2019-02-20

## 2019-02-20 RX ORDER — LORAZEPAM 0.5 MG/1
0.5 TABLET ORAL ONCE
Status: COMPLETED | OUTPATIENT
Start: 2019-02-20 | End: 2019-02-20

## 2019-02-20 RX ORDER — KETOROLAC TROMETHAMINE 15 MG/ML
15 INJECTION, SOLUTION INTRAMUSCULAR; INTRAVENOUS ONCE
Status: COMPLETED | OUTPATIENT
Start: 2019-02-20 | End: 2019-02-20

## 2019-02-20 RX ORDER — ASPIRIN 81 MG/1
324 TABLET, CHEWABLE ORAL ONCE
Status: COMPLETED | OUTPATIENT
Start: 2019-02-20 | End: 2019-02-20

## 2019-02-20 RX ADMIN — KETOROLAC TROMETHAMINE 15 MG: 15 INJECTION, SOLUTION INTRAMUSCULAR; INTRAVENOUS at 20:22

## 2019-02-20 RX ADMIN — LORAZEPAM 0.5 MG: 0.5 TABLET ORAL at 20:22

## 2019-02-20 RX ADMIN — IPRATROPIUM BROMIDE AND ALBUTEROL SULFATE 3 ML: .5; 3 SOLUTION RESPIRATORY (INHALATION) at 18:59

## 2019-02-20 RX ADMIN — ASPIRIN 81 MG 324 MG: 81 TABLET ORAL at 18:24

## 2019-02-20 ASSESSMENT — ENCOUNTER SYMPTOMS
LIGHT-HEADEDNESS: 1
NECK PAIN: 1
CHILLS: 1
DIZZINESS: 0
SHORTNESS OF BREATH: 1
DIAPHORESIS: 0

## 2019-02-20 NOTE — ED AVS SNAPSHOT
[facility] Emergency Department  [address]  [facility] [address]  Phone:  [phone]  Fax:  [phone]                                    Ramila Dietrich   MRN: [id number]    Department:  [facility] Emergency Department   Date of Visit:  2/20/2019           After Visit Summary Signature Page    I have received my discharge instructions, and my questions have been answered. I have discussed any challenges I see with this plan with the nurse or doctor.    ..........................................................................................................................................  Patient/Patient Representative Signature      ..........................................................................................................................................  Patient Representative Print Name and Relationship to Patient    ..................................................               ................................................  Date                                   Time    ..........................................................................................................................................  Reviewed by Signature/Title    ...................................................              ..............................................  Date                                               Time          22EPIC Rev 08/18

## 2019-02-20 NOTE — ED TRIAGE NOTES
Patient presents with chest pain/pressure with shortness of breath that started today at 1300. ABCDs intact, alert and oriented x 4.

## 2019-02-21 LAB
INTERPRETATION ECG - MUSE: NORMAL
INTERPRETATION ECG - MUSE: NORMAL

## 2019-02-21 NOTE — ED PROVIDER NOTES
History     Chief Complaint:  Chest pain    HPI   Ramila Dietrich is a 58 year old female smoker who presents with her boyfriend to the emergency department with concern for Chest pain. The patient reports that yesterday afternoon she experienced a sharp left sided chest pain which subsided after a several minutes once she sat down and she did note shortness of breath with this. She notes that previous to this , however , about one week ago she began noticing a burning pain through her shoulders bilaterally and into her neck and jaw. She states this is constant although intermittently exacerbates. It's persistence today along with a recurrence of the chest pressure 5 hours ago, prompted her evaluation here. She states she has never had these sensations before. She notes concurrent lightheadedness and chills as well as nausea. She denies diaphoresis, dizziness, leg swelling. She notes a previous pulmonary nodule which has not been rechecked lately which is causing her anxiety. She denies hemoptysis or appetite change.     Cardiac/PE/DVT Risk Factors:  The patient has no history of hypertension, hyperlipidemia, diabetes, but formerly smoking. She reports no family history of heart disease aside from a parental MI at age 74. The patient denies any personal or familial history of PE, DVT, or clotting disorder. The patient reports no recent travel, surgery, or other immobilizations.     Allergies:  Aminophylline  Erythromycin    Medications:    Omeprazole     Past Medical History:    GERD  Asthma  Hernia  Gastroenteritis  Hemorrhagic colitis    Past Surgical History:    Appendectomy  C section  Orthopedic  ENT    Family History:    Heart disease  Cancer    Social History:  Smoker  Negative for alcohol use.    Review of Systems   Constitutional: Positive for chills. Negative for diaphoresis.   Respiratory: Positive for shortness of breath.    Cardiovascular: Positive for chest pain. Negative for leg swelling.    Musculoskeletal: Positive for neck pain.   Neurological: Positive for light-headedness. Negative for dizziness.   All other systems reviewed and are negative.      Physical Exam     Patient Vitals for the past 24 hrs:   BP Temp Temp src Heart Rate Resp SpO2   02/20/19 1745 (!) 174/102 98.2  F (36.8  C) Oral 95 24 99 %          Physical Exam  General: Adult female sitting upright  Eyes: PERRL, Conjunctive within normal limits  ENT: Moist mucous membranes, oropharynx clear.   Neck: no rigidity. Nontender.  CV: Normal S1S2, no murmur, rub or gallop. Regular rate and rhythm. Radial pulses intact and equal bilaterally.  Resp: Clear to auscultation bilaterally, no wheezes, rales or rhonchi. Normal respiratory effort.  GI: Abdomen is soft, nontender and nondistended. No palpable masses. No rebound or guarding.  MSK: No edema. Nontender. Normal active range of motion.  Skin: Warm and dry. No rashes or lesions or ecchymoses on visible skin.  Neuro: Alert and oriented. Responds appropriately to all questions and commands. No focal findings appreciated. Normal muscle tone.  Psych: Normal mood and affect. Pleasant.      Emergency Department Course   ECG:  Indication: Chest pain  Time: 1752  Vent. Rate 82 bpm. MD interval 128. QRS duration 74. QT/QTc 376/439. P-R-T axis -19 83 58.  Normal sinus rhythm. Normal ECG. Read time: 1816    Indication: repeat  Time: 2023  Vent. Rate 76 bpm. MD interval 154. QRS duration 82. QT/QTc 404/454. P-R-T axis 72 83 66.  Normal sinus rhythm with sinus arrhythmia. Normal ECG. Read time: 2028    Imaging:  Radiographic findings were communicated with the patient and family who voiced understanding of the findings.    XR Chest 2 views:   1. No active infiltrates. No significant change.    Line  2. Hyperinflated lungs consistent with emphysematous changes. As per radiology.     Laboratory:    CBC: WBC: 12.6, HGB: 14.6, PLT: 319  BMP: All WNL (Creatinine: 0.62)    1830 Troponin: <0015   D dimer:  0.3   BNP: 157    2009 Troponin: <0.015    Interventions:  1824 Aspirin 324 mg oral  1859 Duoneb 3 mL Nebulization  2022 Toradol 15 mg IV   Ativan 0.5 mg Oral    Emergency Department Course:  Nursing notes and vitals reviewed. (1812) I performed an exam of the patient as documented above.     IV inserted. Medicine administered as documented above. Blood drawn. This was sent to the lab for further testing, results above.    The patient was sent for a CXR while in the emergency department, findings above.     (2121) I rechecked the patient and discussed the results of her workup thus far.     Findings and plan explained to the Patient and spouse. Patient discharged home with instructions regarding supportive care, medications, and reasons to return. The importance of close follow-up was reviewed. The patient was prescribed Albuterol, Tramadol.     I personally reviewed the laboratory results with the Patient and spouse and answered all related questions prior to discharge.     Impression & Plan      Medical Decision Making:  Ramila Dietrich is a very pleasant 58 year old year old female who presents to the emergency department with concern of chest pain of unclear etiology.  At this time I do not suspect that there is an acute/dangerous pathology for the chest pain.  They have no significant personal/family history of cardiac disease. They have no significant cardiac risk factors.  The EKG was reviewed and shows no evidence of Brugada syndrome, Potter-Parkinson-White, hypertrophic cardiomyopathy or prolonged QTc syndrome. The chest xray was reviewed and shows no evidence of pneumothorax, infiltrate to suggest pneumonia, widened mediastinum to suggest aortic dissection, obvious rib fracture or free air under the diaphragm to suggest perforated viscous ulcer. Their troponin was negative after 24 hours of symptoms.  There are no focal infiltrates, effusions, pulmonary edema, or evidence of PTX on CXR. The patient has not had  recent fevers or viral infections to suggest pericarditis.  They are at low risk for aortic pathology and have normal aortic contour on CXR.  They have not had recent chest trauma.  All of this was discussed with the patient and they were reassurred. I have ordered an outpatient stress test for the patient to schedule sometime early next week. I have advised them to follow-up with their PCP in the next 3 days to get further evaluation, and to return to the ED sooner if their chest pain continues/worsens, they develop severe SOB/fevers/lightheadedness, or they develop any other new and concerning symptoms. At this point the patient is stable and appropriate for discharge.    The treatment plan was discussed with the patient and they expressed understanding of this plan and consented to the plan.  In addition, the patient will return to the emergency department if their symptoms persist, worsen, if new symptoms arise or if there is any concern as other pathology may be present that is not evident at this time. They also understand the importance of close follow up in the clinic and if unable to do so will return to the emergency department for a reevaluation. All questions were answered.      Diagnosis:    ICD-10-CM    1. Chest pain, unspecified type R07.9    2. Dyspnea, unspecified type R06.00        Disposition:  discharged to home    Discharge Medications:     Medication List      Started    albuterol 108 (90 Base) MCG/ACT inhaler  Commonly known as:  PROAIR HFA/PROVENTIL HFA/VENTOLIN HFA  2 puffs, Inhalation, EVERY 6 HOURS PRN     traMADol 50 MG tablet  Commonly known as:  ULTRAM   mg, Oral, EVERY 6 HOURS PRN          Scribe Disclosure:  I, Seb Crowder, am serving as a scribe on 2/20/2019 at 6:12 PM to personally document services performed by Alondra Massey MD based on my observations and the provider's statements to me.     Seb Crowder  2/20/2019   [facility] EMERGENCY  DEPARTMENT       Alondra Massey MD  02/21/19 3159

## 2019-02-23 NOTE — ED NOTES
Pt called on 2/23/19 to report that tramodol taken last night made her feel very short of breath to the point that she was afraid to go to sleep.  Chart reviewed and she received toradol in ED.  Pt advised that this med is similar to ibuprofen but she states she cannot take ibuprofen due to stomach issues.  She is advised that  options are to come in to be seen in ED again, try tylenol for pain or wait to be seen in clinic for prescription change.

## 2019-07-15 ENCOUNTER — HOSPITAL ENCOUNTER (EMERGENCY)
Facility: CLINIC | Age: 59
Discharge: HOME | End: 2019-07-15
Attending: EMERGENCY MEDICINE | Admitting: EMERGENCY MEDICINE
Payer: COMMERCIAL

## 2019-07-15 VITALS
OXYGEN SATURATION: 100 % | TEMPERATURE: 97.7 F | RESPIRATION RATE: 18 BRPM | SYSTOLIC BLOOD PRESSURE: 162 MMHG | HEART RATE: 89 BPM | DIASTOLIC BLOOD PRESSURE: 100 MMHG

## 2019-07-15 DIAGNOSIS — M25.511 CHRONIC RIGHT SHOULDER PAIN: ICD-10-CM

## 2019-07-15 DIAGNOSIS — M77.9 TENDONITIS: ICD-10-CM

## 2019-07-15 DIAGNOSIS — G89.29 CHRONIC RIGHT SHOULDER PAIN: ICD-10-CM

## 2019-07-15 PROCEDURE — 99283 EMERGENCY DEPT VISIT LOW MDM: CPT

## 2019-07-15 PROCEDURE — 25000132 ZZH RX MED GY IP 250 OP 250 PS 637: Performed by: EMERGENCY MEDICINE

## 2019-07-15 RX ORDER — OXYCODONE HYDROCHLORIDE 5 MG/1
5 TABLET ORAL ONCE
Status: COMPLETED | OUTPATIENT
Start: 2019-07-15 | End: 2019-07-15

## 2019-07-15 RX ORDER — TRAMADOL HYDROCHLORIDE 50 MG/1
50 TABLET ORAL EVERY 6 HOURS PRN
Qty: 10 TABLET | Refills: 0 | Status: SHIPPED | OUTPATIENT
Start: 2019-07-15 | End: 2019-08-31

## 2019-07-15 RX ADMIN — OXYCODONE HYDROCHLORIDE 5 MG: 5 TABLET ORAL at 14:55

## 2019-07-15 NOTE — ED AVS SNAPSHOT
[facility] Emergency Department  [address]  [facility] [address]  Phone:  [phone]  Fax:  [phone]                                    Ramila Dietrich   MRN: [id number]    Department:  [facility] Emergency Department   Date of Visit:  7/15/2019           After Visit Summary Signature Page    I have received my discharge instructions, and my questions have been answered. I have discussed any challenges I see with this plan with the nurse or doctor.    ..........................................................................................................................................  Patient/Patient Representative Signature      ..........................................................................................................................................  Patient Representative Print Name and Relationship to Patient    ..................................................               ................................................  Date                                   Time    ..........................................................................................................................................  Reviewed by Signature/Title    ...................................................              ..............................................  Date                                               Time          22EPIC Rev 08/18

## 2019-07-15 NOTE — ED TRIAGE NOTES
Patient presents with complaints of right shoulder pain. Pain is chronic for patient. She tried to get a cortisone  shot but states that she was told she needed a referral. . ABC intact without need for intervention at this time.

## 2019-07-15 NOTE — ED PROVIDER NOTES
History     Chief Complaint:  Shoulder Pain    HPI   Ramila Dietrich is a 59 year old female who presents with right shoulder pain.  Patient reports she has chronic right shoulder pain.  She was diagnosed with tendinitis in her right shoulder in 2013 and had a steroid injection which significantly helped her pain.  Sometimes her shoulder will flare up but she has been using it more frequently and is having exacerbation of it.  She denies any other symptoms.  She denies any radiation down her arm.  Not have any significant trauma to it.  Does not take anything for pain.  She contact her orthopedic who did her injection prior and they said that she needs a referral.    Allergies:  Aminophylline  Erhthromycin     Medications:    Prilosec    Past Medical History:    Asthma  Hiatal hernia  Acid reflux    Past Surgical History:    Appendectomy  C section x5  Orthopedic surgery  Tonsillectomy    Family History:    Heart disease, MI, emphysema - Mother  Lung cancer - Father  MS- Daughter    Social History:  Marital Status:  Single [1]  Positive for tobacco use. Comment: 1 PPD  Negative for alcohol use.     Review of Systems   Constitutional: Negative for fever.   Musculoskeletal: Positive for arthralgias.   Neurological: Negative for numbness.   All other systems reviewed and are negative.      Physical Exam     Patient Vitals for the past 24 hrs:   BP Temp Pulse Resp SpO2   07/15/19 1439 (!) 162/100 97.7  F (36.5  C) 89 18 100 %      Physical Exam  General: Patient is alert and interactive when I enter the room  Head:  The scalp, face, and head appear normal  Eyes:  Conjunctivae are normal  ENT:    The nose is normal    Pinnae are normal    External acoustic canals are normal  Neck:  Trachea midline  CV:  Pulses are normal.    Resp:  No respiratory distress   Abdomen:      Soft, non-tender, non-distended  Musc:  Normal muscular tone    Tenderness to right shoulder, no obvious joint effusion, significant decreased range of  motion secondary to pain.  2+ radial pulse, sensation intact, able to move hand with good strength  Skin:  No rash or lesions noted  Neuro:  Speech is normal and fluent. Face is symmetric.     Moving all extremities well.   Psych: Awake. Alert.  Normal affect.  Appropriate interactions.    Emergency Department Course   Interventions:  1455 Oxycodone 5 mg PO    Emergency Department Course:  Nursing notes and vitals reviewed. I performed an exam of the patient as documented above.      Medicine administered as documented above.      Findings and plan explained to the Patient. Patient discharged home with instructions regarding supportive care, medications, and reasons to return. The importance of close follow-up was reviewed. The patient was prescribed Tramadol.      I personally answered all related questions prior to discharge.        Impression & Plan      Medical Decision Making:  Ramila Dietrich is a 59 year old female with right shoulder pain.  This is an exacerbation of her chronic pain.  She has not been here for quite some time and is quite uncomfortable so I gave her oxycodone.  No signs of infection and no significant traumatic injury to warrant further imaging.  She would like a referral for orthopedic.  She should contact her previous orthopedics or go to [place] as she may need another cortisone injection.  I will give her a short course of tramadol.  Patient felt comfortable this plan patient discharged.    Diagnosis:    ICD-10-CM    1. Chronic right shoulder pain M25.511     G89.29    2. Tendonitis M77.9        Disposition:  discharged to home    Discharge Medications:     Medication List      Started    * traMADol 50 MG tablet  Commonly known as:  ULTRAM  50 mg, Oral, EVERY 6 HOURS PRN         * This list has 1 medication(s) that are the same as other medications prescribed for you. Read the directions carefully, and ask your doctor or other care provider to review them with you.            ASK your  doctor about these medications    * traMADol 50 MG tablet  Commonly known as:  ULTRAM   mg, Oral, EVERY 6 HOURS PRN  Ask about: Should I take this medication?         * This list has 1 medication(s) that are the same as other medications prescribed for you. Read the directions carefully, and ask your doctor or other care provider to review them with you.                  Scribe Disclosure:  I, Ronna Kendrick, am serving as a scribe on 7/15/2019 at 2:56 PM to personally document services performed by Fabiola Bradford MD based on my observations and the provider's statements to me.      7/15/2019   [facility] EMERGENCY DEPARTMENT       Fabiola Bradford MD  07/16/19 1945

## 2019-07-15 NOTE — DISCHARGE INSTRUCTIONS
Follow-up with Orthopedics. Likely needs another steroid injection in right shoulder. Has chronic shoulder pain and diagnosed with tendonitis in the past.

## 2019-07-15 NOTE — ED NOTES
Patient given written and verbal discharge instructions, answered all questions.  Ambulatory out of department independently.

## 2019-07-16 ASSESSMENT — ENCOUNTER SYMPTOMS
ARTHRALGIAS: 1
FEVER: 0
NUMBNESS: 0

## 2019-08-31 ENCOUNTER — HOSPITAL ENCOUNTER (EMERGENCY)
Facility: CLINIC | Age: 59
Discharge: HOME | End: 2019-08-31
Attending: EMERGENCY MEDICINE | Admitting: EMERGENCY MEDICINE
Payer: COMMERCIAL

## 2019-08-31 VITALS
BODY MASS INDEX: 18.4 KG/M2 | DIASTOLIC BLOOD PRESSURE: 107 MMHG | OXYGEN SATURATION: 99 % | RESPIRATION RATE: 18 BRPM | SYSTOLIC BLOOD PRESSURE: 182 MMHG | WEIGHT: 100 LBS | HEART RATE: 96 BPM | TEMPERATURE: 98.6 F | HEIGHT: 62 IN

## 2019-08-31 DIAGNOSIS — M79.621 PAIN OF RIGHT UPPER ARM: ICD-10-CM

## 2019-08-31 DIAGNOSIS — M25.511 CHRONIC RIGHT SHOULDER PAIN: ICD-10-CM

## 2019-08-31 DIAGNOSIS — G89.29 CHRONIC RIGHT SHOULDER PAIN: ICD-10-CM

## 2019-08-31 PROCEDURE — 99283 EMERGENCY DEPT VISIT LOW MDM: CPT

## 2019-08-31 PROCEDURE — 25000132 ZZH RX MED GY IP 250 OP 250 PS 637: Performed by: EMERGENCY MEDICINE

## 2019-08-31 RX ORDER — TRAMADOL HYDROCHLORIDE 50 MG/1
50 TABLET ORAL EVERY 6 HOURS PRN
Qty: 10 TABLET | Refills: 0 | Status: SHIPPED | OUTPATIENT
Start: 2019-08-31 | End: 2019-10-29

## 2019-08-31 RX ORDER — OXYCODONE HYDROCHLORIDE 5 MG/1
5 TABLET ORAL ONCE
Status: COMPLETED | OUTPATIENT
Start: 2019-08-31 | End: 2019-08-31

## 2019-08-31 RX ORDER — METHYLPREDNISOLONE 4 MG
TABLET, DOSE PACK ORAL
Qty: 21 TABLET | Refills: 0 | Status: SHIPPED | OUTPATIENT
Start: 2019-08-31 | End: 2019-09-14

## 2019-08-31 RX ADMIN — OXYCODONE HYDROCHLORIDE 5 MG: 5 TABLET ORAL at 17:46

## 2019-08-31 ASSESSMENT — ENCOUNTER SYMPTOMS
NECK PAIN: 1
FEVER: 0
ARTHRALGIAS: 1

## 2019-08-31 ASSESSMENT — MIFFLIN-ST. JEOR: SCORE: 981.85

## 2019-08-31 NOTE — DISCHARGE INSTRUCTIONS
*You may resume diet and activities.  *Take medications as prescribed. Ibuprofen and/or tylenol for pain, tramadol for severe pain not relieved by ibuprofen/tylenol. Medrol dose pack. Continue your current medications.  *Follow-up with your orthopedic surgeon in the next 2-3 days for reevaluation and ongoing medication prescriptions.  *Return if you develop numbness, weakness, bowel or bladder incontinence, faint or feel like you will faint or become worse in any way.  *Your blood pressure was elevated here.  Recommend you recheck this with your doctor as an outpatient.

## 2019-08-31 NOTE — ED AVS SNAPSHOT
[facility] Emergency Department  [address]  [facility] [address]  Phone:  [phone]  Fax:  [phone]                                    Ramila Dietrich   MRN: [id number]    Department:  [facility] Emergency Department   Date of Visit:  8/31/2019           After Visit Summary Signature Page    I have received my discharge instructions, and my questions have been answered. I have discussed any challenges I see with this plan with the nurse or doctor.    ..........................................................................................................................................  Patient/Patient Representative Signature      ..........................................................................................................................................  Patient Representative Print Name and Relationship to Patient    ..................................................               ................................................  Date                                   Time    ..........................................................................................................................................  Reviewed by Signature/Title    ...................................................              ..............................................  Date                                               Time          22EPIC Rev 08/18

## 2019-08-31 NOTE — ED PROVIDER NOTES
History     Chief Complaint:  Shoulder pain    HPI   Ramila Dietrich is a 59 year old female with a history of tendonitis and chronic pain who presents to the emergency department for evaluation of right shoulder pain. 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 She states having a history of tendonitis in her right shoulder and that she has received Cortizone shots in the past for her pain. She states having a shot about 6 weeks ago. The patient states that her pain today is similar to her past right shoulder pain. She also mentions having some neck pain. The patient took 3 Advil at about 1330 today. She denies having a fever. Of note, the patient has seen an orthopedic physician and today discussed her symptoms with an orthopedic doctor on call at her [facility] hospital.    Allergies:  Aminophylline  Erythromycin  Zofran    Medications:    Albuterol  Omeprazole    Past Medical History:    Acid reflux  Asthma  Hiatal hernia  Leukocytosis  Tendonitis    Past Surgical History:    Appendectomy  C section x5  Ortho surgery  Tonsillectomy    Family History:    Heart disease  Cancer    Social History:  The patient was accompanied to the ED by her boyfriend.  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    Review of Systems   Constitutional: Negative for fever.   Musculoskeletal: Positive for arthralgias and neck pain.   All other systems reviewed and are negative.      Physical Exam   Vitals:  Patient Vitals for the past 24 hrs:   BP Temp Temp src Pulse Resp SpO2 Height Weight   08/31/19 1730 (!) 182/107 98.6  F (37  C) Temporal 96  "18 99 % 1.575 m (5' 2\") 45.4 kg (100 lb)     Physical Exam  General: Thin, tearful, appears to be in pain  Eyes: PERRL, conjunctivae pink no scleral icterus or conjunctival injection  ENT:  Moist mucus membranes, posterior oropharynx clear without erythema or exudates  Respiratory:  Lungs clear to auscultation bilaterally, no crackles/rubs/wheezes.  Good air movement  CV: Normal rate and rhythm, no murmurs/rubs/gallops  GI:  Abdomen soft and non-distended.  Normoactive BS.  No tenderness, guarding or rebound  Skin: Warm, dry.  No rashes or petechiae  Musculoskeletal: No midline cervical tenderness of percussion. Tender over right paraspinal cervical muscles, trapezius and over posterior shoulder.  No overlying redness or warmth.  No appreciable joint effusion.  Able to actively range with pain.  Normal radial pulse and distal capillary refill, temp and sensation.    Neuro: Alert and oriented to person/place/time. Normal deltoid sensation. Normal distal strength In the right arm at shoulder, elbow and wrist.  Psychiatric: Tearful affect      Emergency Department Course     Interventions:  1746 Roxicodone 5 mg PO    Emergency Department Course:  Nursing notes and vitals reviewed. 1739 I performed an exam of the patient as documented above.     Medicine administered as documented above.    1742 I rechecked the patient and discussed the results of her workup thus far.     Findings and plan explained to the Patient. Patient discharged home with instructions regarding supportive care, medications, and reasons to return. The importance of close follow-up was reviewed. The patient was prescribed Medrol Dosepak.    Impression & Plan      Medical Decision Making:  Ramila Dietrich is a 59 year old female who presents with right shoulder pain and radicular symptoms.  She does have a history of tendinitis in the shoulder and this could certainly could be secondary to tendinitis.  Is also possible that it is a cervical radiculopathy " given that she is been looking up and washing windows and has pain radiating down her right arm as well.  The patient did not sustain any trauma, therefore x-rays are not necessary due to the low likelihood of fracture or subluxation. Advanced imaging with CT/MRI is not indicated at this time, but may be indicated in the future if symptoms fail to resolve.  The patient has not had a fever, saddle/perineal anesthesia, bilateral foot numbness, or bowel or bladder dysfunction.  There is no midline tenderness to percussion or clinical evidence of cauda equina syndrome, discitis, spinal/epidural space hematoma or epidural abscess and nothing in the history that raises red flags for these pathologies.  The neurological exam is normal.  The patient was advised that radiculopathy often takes significant time to resolve. We will start her on a medrol dose pack and she was given a refill on tramadol.  Gave instructions to get her blood pressure rechecked as an outpatient. She already called [facility] and she feels she will be able to get an appointment early next week.  No heavy lifting, bending or twisting. Return if increasing pain, muscular weakness, or bowel or bladder dysfunction.    Diagnosis:    ICD-10-CM    1. Chronic right shoulder pain M25.511     G89.29    2. Pain of right upper arm M79.621        Disposition:  discharged to home    Discharge Medications:  New Prescriptions    METHYLPREDNISOLONE (MEDROL DOSEPAK) 4 MG TABLET THERAPY PACK    Follow Package Directions     Scott APARICIO, malcom serving as a scribe on 8/31/2019 at 5:36 PM to personally document services performed by Edna Che MD based on my observations and the provider's statements to me.     Scott Gamboa  8/31/2019   [facility] EMERGENCY DEPARTMENT       Edna Che MD  08/31/19 9251

## 2019-08-31 NOTE — ED TRIAGE NOTES
"ABCs intact. Pt c/o R shoulder pain. 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 Pt had a shot in her shoulder about 5 weeks ago.   "

## 2019-09-14 ENCOUNTER — APPOINTMENT (OUTPATIENT)
Dept: GENERAL RADIOLOGY | Facility: CLINIC | Age: 59
End: 2019-09-14
Attending: EMERGENCY MEDICINE
Payer: COMMERCIAL

## 2019-09-14 ENCOUNTER — HOSPITAL ENCOUNTER (EMERGENCY)
Facility: CLINIC | Age: 59
Discharge: HOME | End: 2019-09-14
Attending: EMERGENCY MEDICINE | Admitting: EMERGENCY MEDICINE
Payer: COMMERCIAL

## 2019-09-14 VITALS
TEMPERATURE: 98 F | OXYGEN SATURATION: 99 % | RESPIRATION RATE: 18 BRPM | SYSTOLIC BLOOD PRESSURE: 184 MMHG | HEART RATE: 80 BPM | DIASTOLIC BLOOD PRESSURE: 100 MMHG

## 2019-09-14 DIAGNOSIS — R03.0 ELEVATED BLOOD PRESSURE READING WITHOUT DIAGNOSIS OF HYPERTENSION: ICD-10-CM

## 2019-09-14 DIAGNOSIS — M54.2 CERVICALGIA: ICD-10-CM

## 2019-09-14 PROCEDURE — 99283 EMERGENCY DEPT VISIT LOW MDM: CPT

## 2019-09-14 PROCEDURE — 72040 X-RAY EXAM NECK SPINE 2-3 VW: CPT

## 2019-09-14 RX ORDER — METHYLPREDNISOLONE 4 MG
TABLET, DOSE PACK ORAL
Qty: 21 TABLET | Refills: 0 | Status: SHIPPED | OUTPATIENT
Start: 2019-09-14 | End: 2019-10-29

## 2019-09-14 RX ORDER — CYCLOBENZAPRINE HCL 10 MG
10 TABLET ORAL 3 TIMES DAILY PRN
Qty: 20 TABLET | Refills: 0 | Status: SHIPPED | OUTPATIENT
Start: 2019-09-14 | End: 2019-09-20

## 2019-09-14 ASSESSMENT — ENCOUNTER SYMPTOMS
WEAKNESS: 0
NUMBNESS: 0

## 2019-09-14 NOTE — ED AVS SNAPSHOT
[facility] Emergency Department  [address]  [facility] [address]  Phone:  [phone]  Fax:  [phone]                                    Ramila Dietrich   MRN: [id number]    Department:  [facility] Emergency Department   Date of Visit:  9/14/2019           After Visit Summary Signature Page    I have received my discharge instructions, and my questions have been answered. I have discussed any challenges I see with this plan with the nurse or doctor.    ..........................................................................................................................................  Patient/Patient Representative Signature      ..........................................................................................................................................  Patient Representative Print Name and Relationship to Patient    ..................................................               ................................................  Date                                   Time    ..........................................................................................................................................  Reviewed by Signature/Title    ...................................................              ..............................................  Date                                               Time          22EPIC Rev 08/18

## 2019-09-15 NOTE — ED TRIAGE NOTES
Patient presents to L side of neck that has been ongoing for the past 2-3 progressively worse tonight, worse with movement.   HA since yesterday.   Orthopedic appointment on Friday.     Last dose of tylenol and ibuprofen at 1730    Denies hx HA, numbness or tingling sensation to extremities, incontinence or  Recent injuries

## 2019-09-15 NOTE — ED PROVIDER NOTES
History     Chief Complaint:  Neck Pain     The history is provided by the patient.      Ramila Dietrich is a 59 year old female with a history of tendonitis and chronic pain who presents to the emergency department today for evaluation of neck pain. The patient reports she started to have neck and shoulder pain approximately three weeks ago, at which time she was seen here primarily complaining of shoulder pain. Since then she endorses a worsening of pain in her neck bilaterally that shoots up her neck towards the base of her skill. 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 The patient was prescribed a medrol dose pack when she was seen here on 2019, but she didn't take it as she was afraid it would interfere with cortisone shots. She has an appointment in a week for a Cortisone shot to her right shoulder, and had an MRI a week ago of the right shoulder. The patient denies shooting pains down the arms and any numbness or weakness in the extremities. Of note- the patient adds she has a bulging disc in the middle of her back.    Allergies:  Aminophylline  Erythromycin     Medications:    Albuterol inhaler  Omeprazole  Tramadol     Past Medical History:    Asthma  Acid reflux  Hiatal hernia  Gastroenteritis  Hemorrhagic colitis  Chronic pain  Tendonitis    Past Surgical History:    Appendectomy   section, x5  Orthopedic surgery  Tonsillectomy    Family History:    Heart disease- mother  Cancer- lung   MS    Social History:  The patient was accompanied to the ED by her .  Smoking Status: Positive   Smokeless Tobacco: Never Used  Alcohol Use: Negative  Drug Use: Negative    Marital Status:  Single     Review of Systems   Musculoskeletal:        Neck pain  Shoulder pain, right    Neurological: Negative for weakness and numbness.   All other systems reviewed and are negative.        Physical Exam     Patient Vitals for the past 24 hrs:   BP Temp Temp src Pulse Heart Rate Resp  SpO2   09/14/19 2232 -- -- -- -- -- -- 99 %   09/14/19 2231 (!) 184/100 -- -- 80 -- -- --   09/14/19 2040 (!) 188/105 -- -- -- -- -- --   09/14/19 2038 (!) 190/112 98  F (36.7  C) Temporal -- 81 18 99 %        Physical Exam    GEN: sitting comfortably on gurney    HEAD: atraumatic    EYES: pupils reactive, extraocular muscles intact, conjunctivae normal    ENT: TMs normal as are EACs; nares patent; normal posterior pharynx and oral mucosa    NECK: no posterior midline tenderness, no meningeal signs, trachea midline. Bilateral paracervical muscle tenderness. No occipital neuralgia on exam.    RESPIRATORY: no tachypnea, breath sounds clear to auscultation    CVS: normal S1/S2, no murmurs/rubs/gallops    ABDOMEN: soft, nontender, no masses or organomegaly, no rebound, positive bowel sounds    MUSCULOSKELETAL: no deformities. Pain with external rotation more than internal of arms.    SKIN: warm and dry, no acute rashes or ulceration, no erythema     NEURO: GCS 15, cranial nerves intact.  Motor and sensory- good tone; normal gait and coordination. Median ulnar radial nerve function intact bilaterally.    LYMPH: no lymphadenopathy      Emergency Department Course     Imaging:  Radiology findings were communicated with the patient who voiced understanding of the findings.  XR, Cervical Spine, 2-3 Views  IMPRESSION: Straightening of the normal cervical lordosis. Retrolisthesis of C4 on C5 measuring 1 mm and C5 on C6 measuring 1 mm. The vertebral bodies of the cervical spine otherwise have a normal stature and alignment. Degenerative endplate changes and  anterior marginal osteophytes at C4/C5 and C5/C6. Multilevel degenerative disc disease of the cervical spine with disc height loss, most pronounced at C4/C5 and C5/C6. No prevertebral soft tissue swelling. The partially imaged lung apices are unremarkable. Soft tissues unremarkable.  Reading per radiology     Emergency Department Course:  2044 Nursing notes and vitals  reviewed.  2050 I performed an exam of the patient as documented above.   2215 Patient was rechecked and updated prior to discharge.    Findings and plan explained to the Patient. Patient discharged home with instructions regarding supportive care, medications, and reasons to return. The importance of close follow-up was reviewed. The patient was prescribed Flexeril and a Medrol Dosepack.     I personally reviewed the imaging results with the Patient and answered all related questions prior to discharge.    Impression & Plan      Medical Decision Making:  Ramila Dietrich is a 59 year old female who comes in with neck pain. She has no bony tenderness on exam, on exam she has intact neurovascular status of her upper and lower extremities. She does have reproducibly tender paracervical muscles and trapezius muscles. There is no evidence of occipital neuralgia that would respond to injections of local analgesics.    She doesn't have a headache at this time. She does have decreased range of movement in her right shoulder, as described in the physical exam. She has an appointment with an orthopedist on Friday for further evaluation and a possible joint injection.     In the meantime I'm going to start her on a steroid burst, specifically a Medrol dose pack and taper. I also gave her a muscle relaxer. She may use Tylenol while she's using the steroids, and Ibuprofen tonight because she will not start the steroids until tomorrow. Close follow up this week with orthopedics as discussed.     Diagnosis:    ICD-10-CM    1. Cervicalgia M54.2    2. Elevated blood pressure reading without diagnosis of hypertension R03.0        Disposition:  The patient is discharged to home.     Discharge Medications:  New Prescriptions    CYCLOBENZAPRINE (FLEXERIL) 10 MG TABLET    Take 1 tablet (10 mg) by mouth 3 times daily as needed for muscle spasms    METHYLPREDNISOLONE (MEDROL DOSEPAK) 4 MG TABLET THERAPY PACK    Follow Package Directions        Scribe Disclosure:  I, Kandice Javier, am serving as a scribe at 8:52 PM on 9/14/2019 to document services personally performed by Brando Mathew MD based on my observations and the provider's statements to me.   9/14/2019   [facility] EMERGENCY DEPARTMENT       Brando Mathew MD  09/15/19 0115

## 2019-10-29 ENCOUNTER — OFFICE VISIT (OUTPATIENT)
Dept: INTERNAL MEDICINE | Facility: CLINIC | Age: 59
End: 2019-10-29
Payer: COMMERCIAL

## 2019-10-29 VITALS
SYSTOLIC BLOOD PRESSURE: 152 MMHG | OXYGEN SATURATION: 97 % | TEMPERATURE: 98.1 F | HEIGHT: 62 IN | DIASTOLIC BLOOD PRESSURE: 90 MMHG | WEIGHT: 97.2 LBS | HEART RATE: 107 BPM | BODY MASS INDEX: 17.89 KG/M2 | RESPIRATION RATE: 16 BRPM

## 2019-10-29 DIAGNOSIS — Z01.818 PRE-OP EXAM: Primary | ICD-10-CM

## 2019-10-29 LAB
ERYTHROCYTE [DISTWIDTH] IN BLOOD BY AUTOMATED COUNT: 12.6 % (ref 10–15)
HCT VFR BLD AUTO: 37.1 % (ref 35–47)
HGB BLD-MCNC: 12.4 G/DL (ref 11.7–15.7)
MCH RBC QN AUTO: 31.3 PG (ref 26.5–33)
MCHC RBC AUTO-ENTMCNC: 33.4 G/DL (ref 31.5–36.5)
MCV RBC AUTO: 94 FL (ref 78–100)
PLATELET # BLD AUTO: 307 10E9/L (ref 150–450)
RBC # BLD AUTO: 3.96 10E12/L (ref 3.8–5.2)
WBC # BLD AUTO: 11.1 10E9/L (ref 4–11)

## 2019-10-29 PROCEDURE — 36415 COLL VENOUS BLD VENIPUNCTURE: CPT | Performed by: NURSE PRACTITIONER

## 2019-10-29 PROCEDURE — 80048 BASIC METABOLIC PNL TOTAL CA: CPT | Performed by: NURSE PRACTITIONER

## 2019-10-29 PROCEDURE — 85027 COMPLETE CBC AUTOMATED: CPT | Performed by: NURSE PRACTITIONER

## 2019-10-29 PROCEDURE — 99203 OFFICE O/P NEW LOW 30 MIN: CPT | Performed by: NURSE PRACTITIONER

## 2019-10-29 PROCEDURE — 93000 ELECTROCARDIOGRAM COMPLETE: CPT | Performed by: NURSE PRACTITIONER

## 2019-10-29 ASSESSMENT — MIFFLIN-ST. JEOR: SCORE: 969.15

## 2019-10-29 NOTE — LETTER
October 31, 2019      Ramila Dietrich  [address]   [facility] [address]        Dear ,    We are writing to inform you of your test results.    Your recent lab results were normal.     Resulted Orders   CBC with platelets   Result Value Ref Range    WBC 11.1 (H) 4.0 - 11.0 10e9/L    RBC Count 3.96 3.8 - 5.2 10e12/L    Hemoglobin 12.4 11.7 - 15.7 g/dL    Hematocrit 37.1 35.0 - 47.0 %    MCV 94 78 - 100 fl    MCH 31.3 26.5 - 33.0 pg    MCHC 33.4 31.5 - 36.5 g/dL    RDW 12.6 10.0 - 15.0 %    Platelet Count 307 150 - 450 10e9/L   Basic metabolic panel   Result Value Ref Range    Sodium 134 133 - 144 mmol/L    Potassium 4.0 3.4 - 5.3 mmol/L    Chloride 101 94 - 109 mmol/L    Carbon Dioxide 26 20 - 32 mmol/L    Anion Gap 7 3 - 14 mmol/L    Glucose 95 70 - 99 mg/dL      Comment:      Non Fasting    Urea Nitrogen 8 7 - 30 mg/dL    Creatinine 0.76 0.52 - 1.04 mg/dL    GFR Estimate 85 >60 mL/min/[1.73_m2]      Comment:      Non  GFR Calc  Starting 12/18/2018, serum creatinine based estimated GFR (eGFR) will be   calculated using the Chronic Kidney Disease Epidemiology Collaboration   (CKD-EPI) equation.      GFR Estimate If Black >90 >60 mL/min/[1.73_m2]      Comment:       GFR Calc  Starting 12/18/2018, serum creatinine based estimated GFR (eGFR) will be   calculated using the Chronic Kidney Disease Epidemiology Collaboration   (CKD-EPI) equation.      Calcium 9.0 8.5 - 10.1 mg/dL       If you have any questions or concerns, please call the clinic at the number listed above.       Sincerely,        Mona Aguilar NP

## 2019-10-29 NOTE — PROGRESS NOTES
[facility]  [address]  [facility] [address]  [phone]  Dept: [phone]    PRE-OP EVALUATION:  Today's date: 10/29/2019    Ramila Dietrich (: 1960) presents for pre-operative evaluation assessment as requested by Dr. Devine.  She requires evaluation and anesthesia risk assessment prior to undergoing surgery/procedure for treatment of Right rotator cuff repait .    Proposed Surgery/ Procedure: Right rotator cuff repair  Date of Surgery/ Procedure: 19  Time of Surgery/ Procedure: 7:30  Hospital/Surgical Facility: [facility]  Primary Physician: [facility]  Type of Anesthesia Anticipated: General    Patient has a Health Care Directive or Living Will:  NO    1. NO - Do you have a history of heart attack, stroke, stent, bypass or surgery on an artery in the head, neck, heart or legs?  2. NO - Do you ever have any pain or discomfort in your chest?  3. NO - Do you have a history of  Heart Failure?  4. NO - Are you troubled by shortness of breath when: walking on the level, up a slight hill or at night?  5. NO - Do you currently have a cold, bronchitis or other respiratory infection?  6. NO - Do you have a cough, shortness of breath or wheezing?  7. NO - Do you sometimes get pains in the calves of your legs when you walk?  8. NO - Do you or anyone in your family have previous history of blood clots?  9. NO - Do you or does anyone in your family have a serious bleeding problem such as prolonged bleeding following surgeries or cuts?  10. NO - Have you ever had problems with anemia or been told to take iron pills?  11. NO - Have you had any abnormal blood loss such as black, tarry or bloody stools, or abnormal vaginal bleeding?  12. NO - Have you ever had a blood transfusion?  13. NO - Have you or any of your relatives ever had problems with anesthesia?  14. NO - Do you have sleep apnea, excessive snoring or daytime drowsiness?  15. NO - Do you  have any prosthetic heart valves?  16. NO - Do you have prosthetic joints?  17. NO - Is there any chance that you may be pregnant?      HPI:     HPI related to upcoming procedure: R rotator cuff repair      See problem list for active medical problems.  Problems all longstanding and stable, except as noted/documented.  See ROS for pertinent symptoms related to these conditions.      MEDICAL HISTORY:     Patient Active Problem List    Diagnosis Date Noted     Gastroenteritis 2015     Priority: Medium     Mild intermittent asthma 2015     Priority: Medium     Tobacco abuse 2015     Priority: Medium     Hemorrhagic colitis 10/25/2014     Priority: Medium     Diagnosis updated by automated process. Provider to review and confirm.       Pain in shoulder 2013     Priority: Medium      Past Medical History:   Diagnosis Date     Acid reflux      Asthma      Hiatal hernia      Past Surgical History:   Procedure Laterality Date     APPENDECTOMY        SECTION      x5     ORTHOPEDIC SURGERY       TONSILLECTOMY       Current Outpatient Medications   Medication Sig Dispense Refill     omeprazole (PRILOSEC) 20 MG DR capsule Take 20 mg by mouth       OTC products: None, except as noted above    Allergies   Allergen Reactions     Aminophylline      Erythromycin       Latex Allergy: NO    Social History     Tobacco Use     Smoking status: Current Every Day Smoker     Packs/day: 1.00     Smokeless tobacco: Never Used     Tobacco comment: Smoker since age 13. 1/2 ppd.   Substance Use Topics     Alcohol use: No     History   Drug Use No       REVIEW OF SYSTEMS:   CONSTITUTIONAL: NEGATIVE for fever, chills, change in weight  RESP: NEGATIVE for significant cough or SOB  CV: NEGATIVE for chest pain, palpitations or peripheral edema  GI: NEGATIVE for nausea, abdominal pain, heartburn, or change in bowel habits  : NEGATIVE for frequency, dysuria, or hematuria  MUSCULOSKELETAL: NEGATIVE for significant  "arthralgias or myalgia  NEURO: NEGATIVE for weakness, dizziness or paresthesias  ENDOCRINE: NEGATIVE for temperature intolerance, skin/hair changes  HEME: NEGATIVE for bleeding problems  PSYCHIATRIC: NEGATIVE for changes in mood or affect    EXAM:   BP (!) 152/90 (BP Location: Right arm, Patient Position: Sitting, Cuff Size: Adult Regular)   Pulse 107   Temp 98.1  F (36.7  C) (Oral)   Resp 16   Ht 1.575 m (5' 2\")   Wt 44.1 kg (97 lb 3.2 oz)   LMP  (LMP Unknown)   SpO2 97%   BMI 17.78 kg/m      GENERAL APPEARANCE: no distress and underweight     HENT: no teeth or dentures     NECK: no adenopathy, no asymmetry, masses, or scars and thyroid normal to palpation     RESP: lungs clear to auscultation - no rales, rhonchi or wheezes     CV: regular rates and rhythm and tachycardia     ABDOMEN:  soft, nontender, no HSM or masses and bowel sounds normal     MS: extremities normal- no gross deformities noted, no evidence of inflammation in joints, FROM in all extremities.     SKIN: no suspicious lesions or rashes     NEURO: Normal strength and tone, sensory exam grossly normal, mentation intact and speech normal     PSYCH: mentation appears normal. and affect normal/bright    DIAGNOSTICS:   EKG: sinus tachycardia    Recent Labs   Lab Test 02/20/19  1830 12/22/16  1845  10/25/14  2012   HGB 14.6 14.0   < > 14.6    250   < > 305   INR  --   --   --  0.93    138   < > 136   POTASSIUM 3.8 3.8   < > 4.0   CR 0.62 0.62   < > 0.71    < > = values in this interval not displayed.        IMPRESSION:   Reason for surgery/procedure: Right rotator cuff repair    The proposed surgical procedure is considered INTERMEDIATE risk.    REVISED CARDIAC RISK INDEX  The patient has the following serious cardiovascular risks for perioperative complications such as (MI, PE, VFib and 3  AV Block):  No serious cardiac risks  INTERPRETATION: 0 risks: Class I (very low risk - 0.4% complication rate)    The patient has the following " additional risks for perioperative complications:  No identified additional risks      ICD-10-CM    1. Pre-op exam Z01.818 EKG 12-lead complete w/read - Clinics     (Z01.818) Pre-op exam  (primary encounter diagnosis)  Comment:   Plan: EKG 12-lead complete w/read - Clinics, CBC with        platelets, Basic metabolic panel            RECOMMENDATIONS:     --Consult hospital rounder / IM to assist post-op medical management    --Patient is to take all scheduled medications on the day of surgery EXCEPT for modifications listed below.    APPROVAL GIVEN to proceed with proposed procedure, without further diagnostic evaluation       Signed Electronically by: Mona Aguilar NP    Copy of this evaluation report is provided to requesting physician.    [facility] Preop Guidelines    Revised Cardiac Risk Index

## 2019-10-29 NOTE — NURSING NOTE
"Pre op for rt shoulder-rotator cuff repair.  BP (!) 152/90 (BP Location: Right arm, Patient Position: Sitting, Cuff Size: Adult Regular)   Pulse 107   Temp 98.1  F (36.7  C) (Oral)   Resp 16   Ht 1.575 m (5' 2\")   Wt 44.1 kg (97 lb 3.2 oz)   LMP  (LMP Unknown)   SpO2 97%   BMI 17.78 kg/m      "

## 2019-10-30 LAB
ANION GAP SERPL CALCULATED.3IONS-SCNC: 7 MMOL/L (ref 3–14)
BUN SERPL-MCNC: 8 MG/DL (ref 7–30)
CALCIUM SERPL-MCNC: 9 MG/DL (ref 8.5–10.1)
CHLORIDE SERPL-SCNC: 101 MMOL/L (ref 94–109)
CO2 SERPL-SCNC: 26 MMOL/L (ref 20–32)
CREAT SERPL-MCNC: 0.76 MG/DL (ref 0.52–1.04)
GFR SERPL CREATININE-BSD FRML MDRD: 85 ML/MIN/{1.73_M2}
GLUCOSE SERPL-MCNC: 95 MG/DL (ref 70–99)
POTASSIUM SERPL-SCNC: 4 MMOL/L (ref 3.4–5.3)
SODIUM SERPL-SCNC: 134 MMOL/L (ref 133–144)

## 2019-10-30 ASSESSMENT — ASTHMA QUESTIONNAIRES: ACT_TOTALSCORE: 25

## 2020-04-19 ENCOUNTER — VIRTUAL VISIT (OUTPATIENT)
Dept: URGENT CARE | Facility: CLINIC | Age: 60
End: 2020-04-19
Payer: COMMERCIAL

## 2020-04-19 ENCOUNTER — NURSE TRIAGE (OUTPATIENT)
Dept: NURSING | Facility: CLINIC | Age: 60
End: 2020-04-19

## 2020-04-19 VITALS — TEMPERATURE: 98.5 F

## 2020-04-19 DIAGNOSIS — H92.03 OTALGIA, BILATERAL: Primary | ICD-10-CM

## 2020-04-19 DIAGNOSIS — R06.2 WHEEZING: ICD-10-CM

## 2020-04-19 PROCEDURE — 99213 OFFICE O/P EST LOW 20 MIN: CPT | Mod: 95 | Performed by: PHYSICIAN ASSISTANT

## 2020-04-19 RX ORDER — ALBUTEROL SULFATE 90 UG/1
2 AEROSOL, METERED RESPIRATORY (INHALATION) EVERY 6 HOURS
Qty: 1 INHALER | Refills: 1 | Status: ON HOLD | OUTPATIENT
Start: 2020-04-19 | End: 2021-04-06

## 2020-04-19 NOTE — TELEPHONE ENCOUNTER
"I connected her with scheduling to set up an urgent care telephone call. She has had sharp pains in her head since Tuesday and it's a burning or stinging sensation. It takes her breath away when it happens. She said she does have shortness of breath with walking.  Briseyda Winkler RN  [facility]      Reason for Disposition    [1] MILD difficulty breathing (e.g., minimal/no SOB at rest, SOB with walking, pulse <100) AND [2] NEW-onset or WORSE than normal    Additional Information    Negative: [1] Breathing stopped AND [2] hasn't returned    Negative: Choking on something    Negative: Severe difficulty breathing (e.g., struggling for each breath, speaks in single words)    Negative: Bluish (or gray) lips or face now    Negative: Difficult to awaken or acting confused (e.g., disoriented, slurred speech)    Negative: Passed out (i.e., lost consciousness, collapsed and was not responding)    Negative: Wheezing started suddenly after medicine, an allergic food or bee sting    Negative: Stridor    Negative: Slow, shallow and weak breathing    Negative: Sounds like a life-threatening emergency to the triager    Negative: Chest pain    Negative: [1] Wheezing (high pitched whistling sound) AND [2] previous asthma attacks or use of asthma medicines    Negative: [1] Difficulty breathing AND [2] only present when coughing    Negative: [1] Difficulty breathing AND [2] only from stuffy or runny nose    Negative: [1] MODERATE difficulty breathing (e.g., speaks in phrases, SOB even at rest, pulse 100-120) AND [2] NEW-onset or WORSE than normal    Negative: Wheezing can be heard across the room    Negative: Drooling or spitting out saliva (because can't swallow)    Negative: History of prior \"blood clot\" in leg or lungs (i.e., deep vein thrombosis, pulmonary embolism)    Negative: History of inherited increased risk of blood clots (e.g., Factor 5 Leiden, Anti-thrombin 3, Protein C or Protein S deficiency, Prothrombin " "mutation)    Negative: Recent illness requiring prolonged bedrest (i.e., immobilization)    Negative: Hip or leg fracture in past 2 months (e.g., had cast on leg or ankle)    Negative: Major surgery in the past month    Negative: Recent long-distance travel with prolonged time in car, bus, plane, or train (i.e., within past 2 weeks; 6 or  more hours duration)    Negative: Extra heart beats OR irregular heart beating   (i.e., \"palpitations\")    Negative: Fever > 103 F (39.4 C)    Negative: [1] Fever > 101 F (38.3 C) AND [2] age > 60    Negative: [1] Fever > 100.0 F (37.8 C) AND [2] bedridden (e.g., nursing home patient, CVA, chronic illness, recovering from surgery)    Negative: [1] Fever > 100.0 F (37.8 C) AND [2] diabetes mellitus or weak immune system (e.g., HIV positive, cancer chemo, splenectomy, chronic steroids)    Negative: [1] Periods where breathing stops and then resumes normally AND [2] bedridden (e.g., nursing home patient, CVA)    Negative: Pregnant or postpartum (< 1 month since delivery)    Negative: Patient sounds very sick or weak to the triager    Protocols used: BREATHING DIFFICULTY-A-AH      "

## 2020-04-20 ENCOUNTER — OFFICE VISIT (OUTPATIENT)
Dept: INTERNAL MEDICINE | Facility: CLINIC | Age: 60
End: 2020-04-20
Payer: COMMERCIAL

## 2020-04-20 VITALS
DIASTOLIC BLOOD PRESSURE: 84 MMHG | BODY MASS INDEX: 16.97 KG/M2 | SYSTOLIC BLOOD PRESSURE: 154 MMHG | WEIGHT: 92.8 LBS | RESPIRATION RATE: 18 BRPM | HEART RATE: 97 BPM | OXYGEN SATURATION: 97 %

## 2020-04-20 DIAGNOSIS — R03.0 ELEVATED BP WITHOUT DIAGNOSIS OF HYPERTENSION: ICD-10-CM

## 2020-04-20 DIAGNOSIS — H66.90 EAR INFECTION: Primary | ICD-10-CM

## 2020-04-20 PROCEDURE — 99213 OFFICE O/P EST LOW 20 MIN: CPT | Performed by: NURSE PRACTITIONER

## 2020-04-20 RX ORDER — CEFDINIR 300 MG/1
300 CAPSULE ORAL 2 TIMES DAILY
Qty: 20 CAPSULE | Refills: 0 | Status: SHIPPED | OUTPATIENT
Start: 2020-04-20 | End: 2021-03-05

## 2020-04-20 NOTE — PATIENT INSTRUCTIONS
Please call to make an appointment at the nearest [facility] urgent care clinic tomorrow for in patient evaluation of ear pain. [phone] ([phone])

## 2020-04-20 NOTE — PROGRESS NOTES
Subjective     Ramila Dietrich is a 60 year old female who presents to clinic today for the following health issues:    HPI   bilateral ear pressure and burning X 6 days  She did not have cold symptoms prior to start of pain   No fever  But has felt flushing like hot flashes off and on   No secretions nasally   No sinus pain   No change in breathing     Blood pressure elevated- has been elevated in past visits by review of chart   Today it did improve on recheck   Will not treat today but discussed if continues to be elevated ongoing, she needs to go on blood pressure medication            Patient Active Problem List   Diagnosis     Pain in shoulder     Hemorrhagic colitis     Mild intermittent asthma     Tobacco abuse     Gastroenteritis     Past Surgical History:   Procedure Laterality Date     APPENDECTOMY        SECTION      x5     ORTHOPEDIC SURGERY       TONSILLECTOMY         Social History     Tobacco Use     Smoking status: Current Every Day Smoker     Packs/day: 1.00     Smokeless tobacco: Never Used     Tobacco comment: Smoker since age 13. 1/2 ppd.   Substance Use Topics     Alcohol use: No     Family History   Problem Relation Age of Onset     Heart Disease Mother          75yo [place] emphysema smoker     Cancer Father          85yo lung smoker     Family History Negative Sister 32        lung issue     Family History Negative Son      Family History Negative Son      Family History Negative Son      Family History Negative Daughter      Neurologic Disorder Daughter 23        MS     Cancer Maternal Grandfather          age? throat smoker             Reviewed and updated as needed this visit by Provider  Tobacco  Allergies  Meds  Problems  Med Hx  Surg Hx  Fam Hx         Review of Systems   ROS COMP: Constitutional, HEENT, cardiovascular, pulmonary, gi and gu systems are negative, except as otherwise noted.      Objective    BP (!) 154/84   Pulse 97   Resp 18   Wt 42.1  kg (92 lb 12.8 oz)   LMP  (LMP Unknown)   SpO2 97%   Breastfeeding No   BMI 16.97 kg/m    Body mass index is 16.97 kg/m .  Physical Exam   GENERAL: alert and no distress- smells of cigarette smoke   HENT: ear canals normal and no pain with ear movement or touching ear or behind ear and TM's dull with pink, nose and mouth without ulcers or lesions  Some discomfort around the ear in lymph node area around the ear   RESP: lungs clear to auscultation - no rales, rhonchi or wheezes  CV: regular rate and rhythm  PSYCH: mentation appears normal, affect normal/bright    Diagnostic Test Results:  none         Assessment & Plan     1. Ear infection  Bilaterally   Discomfort related to this   - cefdinir (OMNICEF) 300 MG capsule; Take 1 capsule (300 mg) by mouth 2 times daily  Dispense: 20 capsule; Refill: 0    2. Elevated BP without diagnosis of hypertension  Her blood pressure by review of chart has been high   Her blood pressure today did come into a reasonable level after resting - she has some discomfort which could elevate her blood pressure   Did discuss if blood pressure above 140/90 at home or at other visits we would consider treating it- she did not want to treat today      Tobacco Cessation:   reports that she has been smoking. She has been smoking about 1.00 pack per day. She has never used smokeless tobacco.  Tobacco Cessation Action Plan: Information offered: Patient not interested at this time        Patient Instructions   Omnicef twice daily for 10 days     Xyzal, Zyrtec , allegra, claritin once daily     Watch blood pressure - if remains above 140/90,  we would consider medication       Return in about 4 weeks (around 5/18/2020) for BP Recheck.    ANGÉLICA Singh [facility]

## 2020-04-20 NOTE — PATIENT INSTRUCTIONS
Omnicef twice daily for 10 days     Xyzal, Zyrtec , allegra, claritin once daily     Watch blood pressure - if remains above 140/90,  we would consider medication

## 2020-04-20 NOTE — PROGRESS NOTES
"Ramila Dietrich is a 60 year old female who is being evaluated via a billable telephone visit.      The patient has been notified of following:     \"This telephone visit will be conducted via a call between you and your physician/provider. We have found that certain health care needs can be provided without the need for a physical exam.  This service lets us provide the care you need with a short phone conversation.  If a prescription is necessary we can send it directly to your pharmacy.  If lab work is needed we can place an order for that and you can then stop by our lab to have the test done at a later time.    Telephone visits are billed at different rates depending on your insurance coverage. During this emergency period, for some insurers they may be billed the same as an in-person visit.  Please reach out to your insurance provider with any questions.    If during the course of the call the physician/provider feels a telephone visit is not appropriate, you will not be charged for this service.\"    Patient has given verbal consent for Telephone visit?  Yes    How would you like to obtain your AVS? Mail    Subjective     Ramila Dietrich is a 60 year old female who presents to clinic today for the following health issues: Patient reports \"stinging/burning\" in her ears (left and right) and a stinging sensation on the back of her head/scalp. Denies any rashes/blisters on her scalp. She states that tylenol does help with the pain but temporarily. Denies throat pain. She says she feels something in her glands under her ears. She also reports some difficulty breathing which is not new for her. She has been prescribed albuterol inhaler in the past but she says it's . She is requesting a refill for that. She does not report any difficulty breathing at this time.   Denies cough/cold sxs.     No recent ear infections.  Current smoker.       Patient Active Problem List   Diagnosis     Pain in shoulder     Hemorrhagic " "colitis     Mild intermittent asthma     Tobacco abuse     Gastroenteritis     Past Surgical History:   Procedure Laterality Date     APPENDECTOMY        SECTION      x5     ORTHOPEDIC SURGERY       TONSILLECTOMY         Social History     Tobacco Use     Smoking status: Current Every Day Smoker     Packs/day: 1.00     Smokeless tobacco: Never Used     Tobacco comment: Smoker since age 13. 1/2 ppd.   Substance Use Topics     Alcohol use: No     Family History   Problem Relation Age of Onset     Heart Disease Mother          75yo [place] emphysema smoker     Cancer Father          85yo lung smoker     Family History Negative Sister 32        lung issue     Family History Negative Son      Family History Negative Son      Family History Negative Son      Family History Negative Daughter      Neurologic Disorder Daughter 23        MS     Cancer Maternal Grandfather          age? throat smoker       She states she also has anxiety.     Reviewed and updated as needed this visit by Provider  Tobacco  Allergies  Meds         Review of Systems   ROS COMP: Constitutional, HEENT, cardiovascular, pulmonary, gi and gu systems are negative, except as otherwise noted.       Objective   Reported vitals:  No fevers   General: healthy and no distress  PSYCH: Alert and oriented times 3; coherent speech, normal   rate and volume, able to articulate logical thoughts, able   to abstract reason, no tangential thoughts, no hallucinations   or delusions  Her affect is normal  RESP: No cough, no audible wheezing, able to talk in full sentences  Remainder of exam unable to be completed due to telephone visits      Assessment/Plan:  Bilateral otalgia  Instructed patient to make an appointment for an in-person urgent care visit tomorrow for evaluation of \"stinging\" in her ears.     Wheezing  Rx albuterol inhaler.       Diagnosis and treatment plan were discussed with patient and/or parent. If symptoms worsen or do " not improve in the next few days, follow-up with your primary care provider or visit an urgent care clinic.  Patient verbalizes understanding of all things discussed. All questions were addressed and answered.       Phone call duration:  13 minutes    Sury Pantoja PA-C

## 2021-01-08 ENCOUNTER — HOSPITAL ENCOUNTER (EMERGENCY)
Facility: CLINIC | Age: 61
Discharge: HOME | End: 2021-01-09
Attending: EMERGENCY MEDICINE | Admitting: EMERGENCY MEDICINE
Payer: COMMERCIAL

## 2021-01-08 DIAGNOSIS — K57.32 DIVERTICULITIS OF COLON: ICD-10-CM

## 2021-01-08 PROCEDURE — 81001 URINALYSIS AUTO W/SCOPE: CPT | Performed by: EMERGENCY MEDICINE

## 2021-01-08 PROCEDURE — 85025 COMPLETE CBC W/AUTO DIFF WBC: CPT | Performed by: EMERGENCY MEDICINE

## 2021-01-08 PROCEDURE — 258N000003 HC RX IP 258 OP 636: Performed by: EMERGENCY MEDICINE

## 2021-01-08 PROCEDURE — 250N000011 HC RX IP 250 OP 636: Performed by: EMERGENCY MEDICINE

## 2021-01-08 PROCEDURE — 80053 COMPREHEN METABOLIC PANEL: CPT | Performed by: EMERGENCY MEDICINE

## 2021-01-08 PROCEDURE — 96375 TX/PRO/DX INJ NEW DRUG ADDON: CPT

## 2021-01-08 PROCEDURE — 96374 THER/PROPH/DIAG INJ IV PUSH: CPT

## 2021-01-08 PROCEDURE — 83690 ASSAY OF LIPASE: CPT | Performed by: EMERGENCY MEDICINE

## 2021-01-08 PROCEDURE — 99285 EMERGENCY DEPT VISIT HI MDM: CPT | Mod: 25

## 2021-01-08 PROCEDURE — 96376 TX/PRO/DX INJ SAME DRUG ADON: CPT

## 2021-01-08 PROCEDURE — 96361 HYDRATE IV INFUSION ADD-ON: CPT

## 2021-01-08 PROCEDURE — 93005 ELECTROCARDIOGRAM TRACING: CPT

## 2021-01-08 RX ORDER — ONDANSETRON 2 MG/ML
4 INJECTION INTRAMUSCULAR; INTRAVENOUS EVERY 30 MIN PRN
Status: DISCONTINUED | OUTPATIENT
Start: 2021-01-08 | End: 2021-01-09 | Stop reason: HOSPADM

## 2021-01-08 RX ORDER — HYDROMORPHONE HYDROCHLORIDE 1 MG/ML
0.5 INJECTION, SOLUTION INTRAMUSCULAR; INTRAVENOUS; SUBCUTANEOUS
Status: DISCONTINUED | OUTPATIENT
Start: 2021-01-08 | End: 2021-01-09 | Stop reason: HOSPADM

## 2021-01-08 RX ORDER — KETOROLAC TROMETHAMINE 15 MG/ML
10 INJECTION, SOLUTION INTRAMUSCULAR; INTRAVENOUS ONCE
Status: COMPLETED | OUTPATIENT
Start: 2021-01-08 | End: 2021-01-08

## 2021-01-08 RX ORDER — SODIUM CHLORIDE 9 MG/ML
INJECTION, SOLUTION INTRAVENOUS CONTINUOUS
Status: DISCONTINUED | OUTPATIENT
Start: 2021-01-09 | End: 2021-01-09 | Stop reason: HOSPADM

## 2021-01-08 RX ADMIN — KETOROLAC TROMETHAMINE 10 MG: 15 INJECTION, SOLUTION INTRAMUSCULAR; INTRAVENOUS at 23:53

## 2021-01-08 RX ADMIN — ONDANSETRON 4 MG: 2 INJECTION INTRAMUSCULAR; INTRAVENOUS at 23:53

## 2021-01-08 RX ADMIN — HYDROMORPHONE HYDROCHLORIDE 0.5 MG: 1 INJECTION, SOLUTION INTRAMUSCULAR; INTRAVENOUS; SUBCUTANEOUS at 23:54

## 2021-01-08 RX ADMIN — SODIUM CHLORIDE 1000 ML: 9 INJECTION, SOLUTION INTRAVENOUS at 23:53

## 2021-01-08 ASSESSMENT — ENCOUNTER SYMPTOMS
NAUSEA: 0
VOMITING: 0
ABDOMINAL PAIN: 1
FEVER: 0

## 2021-01-08 NOTE — ED AVS SNAPSHOT
[facility]  [address]  [facility] [address]  Phone: [phone]  Fax: [phone]                                    Ramila Dietrich   MRN: [id number]    Department: [facility] Emergency [facility]   Date of Visit: 1/8/2021           After Visit Summary Signature Page    I have received my discharge instructions, and my questions have been answered. I have discussed any challenges I see with this plan with the nurse or doctor.    ..........................................................................................................................................  Patient/Patient Representative Signature      ..........................................................................................................................................  Patient Representative Print Name and Relationship to Patient    ..................................................               ................................................  Date                                   Time    ..........................................................................................................................................  Reviewed by Signature/Title    ...................................................              ..............................................  Date                                               Time          22EPIC Rev 08/18

## 2021-01-09 ENCOUNTER — APPOINTMENT (OUTPATIENT)
Dept: CT IMAGING | Facility: CLINIC | Age: 61
End: 2021-01-09
Attending: EMERGENCY MEDICINE
Payer: COMMERCIAL

## 2021-01-09 VITALS
TEMPERATURE: 97.3 F | DIASTOLIC BLOOD PRESSURE: 116 MMHG | HEART RATE: 88 BPM | OXYGEN SATURATION: 100 % | SYSTOLIC BLOOD PRESSURE: 158 MMHG | RESPIRATION RATE: 18 BRPM

## 2021-01-09 LAB
ALBUMIN SERPL-MCNC: 3.9 G/DL (ref 3.4–5)
ALBUMIN UR-MCNC: NEGATIVE MG/DL
ALP SERPL-CCNC: 128 U/L (ref 40–150)
ALT SERPL W P-5'-P-CCNC: 16 U/L (ref 0–50)
ANION GAP SERPL CALCULATED.3IONS-SCNC: 1 MMOL/L (ref 3–14)
APPEARANCE UR: CLEAR
AST SERPL W P-5'-P-CCNC: 27 U/L (ref 0–45)
BASOPHILS # BLD AUTO: 0 10E9/L (ref 0–0.2)
BASOPHILS NFR BLD AUTO: 0 %
BILIRUB SERPL-MCNC: 0.3 MG/DL (ref 0.2–1.3)
BILIRUB UR QL STRIP: NEGATIVE
BUN SERPL-MCNC: 12 MG/DL (ref 7–30)
CALCIUM SERPL-MCNC: 9.1 MG/DL (ref 8.5–10.1)
CHLORIDE SERPL-SCNC: 108 MMOL/L (ref 94–109)
CO2 SERPL-SCNC: 27 MMOL/L (ref 20–32)
COLOR UR AUTO: ABNORMAL
CREAT SERPL-MCNC: 0.69 MG/DL (ref 0.52–1.04)
DIFFERENTIAL METHOD BLD: ABNORMAL
EOSINOPHIL # BLD AUTO: 0.5 10E9/L (ref 0–0.7)
EOSINOPHIL NFR BLD AUTO: 3 %
ERYTHROCYTE [DISTWIDTH] IN BLOOD BY AUTOMATED COUNT: 12.5 % (ref 10–15)
GFR SERPL CREATININE-BSD FRML MDRD: >90 ML/MIN/{1.73_M2}
GLUCOSE SERPL-MCNC: 84 MG/DL (ref 70–99)
GLUCOSE UR STRIP-MCNC: NEGATIVE MG/DL
HCT VFR BLD AUTO: 41.5 % (ref 35–47)
HGB BLD-MCNC: 13.8 G/DL (ref 11.7–15.7)
HGB UR QL STRIP: ABNORMAL
KETONES UR STRIP-MCNC: NEGATIVE MG/DL
LEUKOCYTE ESTERASE UR QL STRIP: NEGATIVE
LIPASE SERPL-CCNC: 193 U/L (ref 73–393)
LYMPHOCYTES # BLD AUTO: 3.8 10E9/L (ref 0.8–5.3)
LYMPHOCYTES NFR BLD AUTO: 25 %
MCH RBC QN AUTO: 31.2 PG (ref 26.5–33)
MCHC RBC AUTO-ENTMCNC: 33.3 G/DL (ref 31.5–36.5)
MCV RBC AUTO: 94 FL (ref 78–100)
MONOCYTES # BLD AUTO: 1.1 10E9/L (ref 0–1.3)
MONOCYTES NFR BLD AUTO: 7 %
NEUTROPHILS # BLD AUTO: 9.8 10E9/L (ref 1.6–8.3)
NEUTROPHILS NFR BLD AUTO: 65 %
NITRATE UR QL: NEGATIVE
PH UR STRIP: 6.5 PH (ref 5–7)
PLATELET # BLD AUTO: 318 10E9/L (ref 150–450)
PLATELET # BLD EST: ABNORMAL 10*3/UL
POTASSIUM SERPL-SCNC: 4.8 MMOL/L (ref 3.4–5.3)
PROT SERPL-MCNC: 7.9 G/DL (ref 6.8–8.8)
RBC # BLD AUTO: 4.42 10E12/L (ref 3.8–5.2)
RBC #/AREA URNS AUTO: 0 /HPF (ref 0–2)
RBC MORPH BLD: ABNORMAL
SODIUM SERPL-SCNC: 136 MMOL/L (ref 133–144)
SOURCE: ABNORMAL
SP GR UR STRIP: 1.01 (ref 1–1.03)
SQUAMOUS #/AREA URNS AUTO: <1 /HPF (ref 0–1)
UROBILINOGEN UR STRIP-MCNC: NORMAL MG/DL (ref 0–2)
WBC # BLD AUTO: 15 10E9/L (ref 4–11)
WBC #/AREA URNS AUTO: <1 /HPF (ref 0–5)

## 2021-01-09 PROCEDURE — 250N000011 HC RX IP 250 OP 636: Performed by: EMERGENCY MEDICINE

## 2021-01-09 PROCEDURE — 74177 CT ABD & PELVIS W/CONTRAST: CPT

## 2021-01-09 PROCEDURE — 250N000009 HC RX 250: Performed by: EMERGENCY MEDICINE

## 2021-01-09 RX ORDER — IOPAMIDOL 755 MG/ML
500 INJECTION, SOLUTION INTRAVASCULAR ONCE
Status: COMPLETED | OUTPATIENT
Start: 2021-01-09 | End: 2021-01-09

## 2021-01-09 RX ORDER — ONDANSETRON 4 MG/1
4 TABLET, ORALLY DISINTEGRATING ORAL EVERY 8 HOURS PRN
Qty: 10 TABLET | Refills: 0 | Status: SHIPPED | OUTPATIENT
Start: 2021-01-09 | End: 2021-01-12

## 2021-01-09 RX ORDER — OXYCODONE AND ACETAMINOPHEN 5; 325 MG/1; MG/1
1-2 TABLET ORAL EVERY 4 HOURS PRN
Qty: 12 TABLET | Refills: 0 | Status: SHIPPED | OUTPATIENT
Start: 2021-01-09 | End: 2021-03-05

## 2021-01-09 RX ADMIN — SODIUM CHLORIDE 52 ML: 9 INJECTION, SOLUTION INTRAVENOUS at 00:20

## 2021-01-09 RX ADMIN — HYDROMORPHONE HYDROCHLORIDE 0.5 MG: 1 INJECTION, SOLUTION INTRAMUSCULAR; INTRAVENOUS; SUBCUTANEOUS at 00:57

## 2021-01-09 RX ADMIN — IOPAMIDOL 47 ML: 755 INJECTION, SOLUTION INTRAVENOUS at 00:18

## 2021-01-09 NOTE — ED TRIAGE NOTES
Pt reports 1 month of pelvic pain, progressively worsening. Also noted vaginal lump 1 week ago that burns. Denies urinary symptoms, N/V or vaginal bleeding.  ABCs intact, A&OX4.

## 2021-01-09 NOTE — ED PROVIDER NOTES
History   Chief Complaint:  Pelvic Pain       HPI   Ramila Dietrich is a 60 year old female who presents with abdominal pain. The patient reports having intermittent right lower quadrant and left lower quadrant abdominal pain with radiation into the back for the past 4 weeks. She states that the abdominal pain feels like cramps. She has tried taking Tylenol for her pain today without significant improvement. She also notes having a vaginal lump for the past week that burns. She denies any nausea, vomiting, fevers, vaginal bleeding, or vaginal discharge.     Review of Systems   Constitutional: Negative for fever.   Gastrointestinal: Positive for abdominal pain (RLQ and LLQ). Negative for nausea and vomiting.   Genitourinary: Negative for vaginal bleeding and vaginal discharge.        Burning vaginal lump   All other systems reviewed and are negative.         Allergies:  Aminophylline  Erythromycin    Medications:  Omeprazole     Past Medical History:    Acid reflux   Asthma   Hiatal hernia     Past Surgical History:    Appendectomy      Orthopedic surgery   Tonsillectomy     Family History:    Heart disease  Cancer   MS    Social History:  The patient presents alone.   Alcohol use: no     Physical Exam     Patient Vitals for the past 24 hrs:   BP Temp Temp src Pulse Resp SpO2   21 0101 (!) 158/116 -- -- -- -- 100 %   21 0010 (!) 190/105 -- -- -- -- 96 %   21 2330 (!) 188/105 -- -- 88 -- 99 %   21 2300 (!) 176/101 -- -- 84 -- 99 %   21 2155 (!) 198/108 97.3  F (36.3  C) Oral 96 18 100 %       Physical Exam  Constitutional:  Oriented to person, place, and time. Appears well-developed.      No distress.   HENT:   Head:    Normocephalic.   Mouth/Throat:   Oropharynx is clear and moist.   Eyes:    EOM are normal. Pupils are equal, round, and reactive to light.   Neck:    Neck supple.   Cardiovascular:  Normal rate, regular rhythm and normal heart sounds.      Exam reveals no gallop  and no friction rub.       No murmur heard.  Pulmonary/Chest:  Effort normal and breath sounds normal.      No respiratory distress. No wheezes. No rales.   Abdominal:   Soft. No distension. No rebound and no guarding. Generalized left and right lower quadrant tenderness, left greater than right  Musculoskeletal:  Normal range of motion.   Neurological:   Alert and oriented to person, place, and time.           Moves all 4 extremities spontaneously    Skin:    No rash noted. No pallor.   :    Slight white phys discharge, no CMT, small left bartholin gland palpable with no significant swelling, bogginess, or fluctuance.     Emergency Department Course     ECG:  ECG taken at 2343, ECG read at 2344  Sinus rhythm  Normal ECG  Vent. rate 76 BPM  VT interval 156 ms  QRS duration 78 ms  QT/QTc 382/429 ms  P-R-T axes 59 79 74    Imaging:  CT Abdomen Pelvis w Contrast  Final Result  IMPRESSION:   1.  Mild acute uncomplicated sigmoid diverticulitis.  2.  Mosaic attenuation in lower lobes can be seen with air trapping.  Reading per radiology     Laboratory:  UA with microscopic: urine blood trace (A), o/w WNL      CBC:  WBC 15.0 (H), HGB 13.8, , o/w WNL     CMP: anion gap 1 (L), o/w WNL (Creatinine: 0.69)     Lipase: 193      Emergency Department Course:    Reviewed:  I reviewed the patient's nursing notes, vitals, past medical records, Care Everywhere.     Assessments:  2330  I performed an exam of the patient as documented above.   0050 Patient rechecked and updated.     Interventions:  2353 0.9% sodium chloride BOLUS 1000 mL IV   2353 zofran 4 mg IV   2353 Toradol 15 mg IV   2354 Dilaudid 0.5 mg IV   augmentin 1 tablet oral     Disposition:  Discharged to home.      Impression & Plan     Medical Decision Making:  Ramila Dietrich is a 60 year old female who presents with abdominal pain concerning for diverticulitis. CT confirms diverticulitis without abscess or perforation. Her pain has been controlled by the  interventions in the emergency department. On recheck, she has a non-surgical exam, pain is controlled, and she is tolerating POs.  I discussed indications for admission versus discharge and together we opted for outpatient management.  I will prescribe augmentin and supportive medications as per below. I recommended she return to the ED for worsening pain, fever, vomiting, bloody or black stools, or for any other concerning symptoms. I recommended she follow up with her primary care physician in 2-3 days.    Diagnosis:    ICD-10-CM    1. Diverticulitis of colon  K57.32        Discharge Medications:  New Prescriptions    AMOXICILLIN-CLAVULANATE (AUGMENTIN) 875-125 MG TABLET    Take 1 tablet by mouth 2 times daily for 10 days    ONDANSETRON (ZOFRAN ODT) 4 MG ODT TAB    Take 1 tablet (4 mg) by mouth every 8 hours as needed    OXYCODONE-ACETAMINOPHEN (PERCOCET) 5-325 MG TABLET    Take 1-2 tablets by mouth every 4 hours as needed for pain       Scribe Disclosure:  I, Edmar Russo, am serving as a scribe at 11:30 PM on 1/8/2021 to document services personally performed by Edy Tan MD based on my observations and the provider's statements to me.        Edy Tan MD  01/09/21 [id number]

## 2021-01-11 LAB — INTERPRETATION ECG - MUSE: NORMAL

## 2021-03-05 ENCOUNTER — OFFICE VISIT (OUTPATIENT)
Dept: INTERNAL MEDICINE | Facility: CLINIC | Age: 61
End: 2021-03-05
Payer: COMMERCIAL

## 2021-03-05 VITALS
BODY MASS INDEX: 18.11 KG/M2 | HEIGHT: 62 IN | WEIGHT: 98.4 LBS | HEART RATE: 109 BPM | DIASTOLIC BLOOD PRESSURE: 100 MMHG | SYSTOLIC BLOOD PRESSURE: 170 MMHG | RESPIRATION RATE: 17 BRPM | TEMPERATURE: 97.9 F | OXYGEN SATURATION: 98 %

## 2021-03-05 DIAGNOSIS — H69.93 DYSFUNCTION OF BOTH EUSTACHIAN TUBES: ICD-10-CM

## 2021-03-05 DIAGNOSIS — I10 ESSENTIAL HYPERTENSION: Primary | ICD-10-CM

## 2021-03-05 PROCEDURE — 99214 OFFICE O/P EST MOD 30 MIN: CPT | Performed by: INTERNAL MEDICINE

## 2021-03-05 RX ORDER — AMLODIPINE BESYLATE 10 MG/1
10 TABLET ORAL DAILY
Qty: 30 TABLET | Refills: 0 | Status: ON HOLD | OUTPATIENT
Start: 2021-03-05 | End: 2021-03-27

## 2021-03-05 RX ORDER — FLUTICASONE PROPIONATE 50 MCG
1 SPRAY, SUSPENSION (ML) NASAL DAILY
Qty: 9.9 ML | Refills: 0 | Status: SHIPPED | OUTPATIENT
Start: 2021-03-05 | End: 2021-03-24

## 2021-03-05 ASSESSMENT — MIFFLIN-ST. JEOR: SCORE: 969.59

## 2021-03-05 NOTE — PROGRESS NOTES
Assessment & Plan     (I10) Essential hypertension  (primary encounter diagnosis)  Comment: BP elevated  Plan: Discussed sodium restriction, maintaining ideal body weight and regular exercise program as physiologic means to achieve blood pressure control. The patient will strive towards this. Meanwhile, it is appropriate to lower BP with medications, while observing for therapeutic effect and if appropriate later, can discontinue medications if physiologic methods appear to be effective. The patient indicates understanding of these issues and agrees with the plan. Started on amLODIPine (NORVASC) 10 MG tablet salmeron as directed.explained clearly about the medication,insructions and side effects.   Continue home readings and return in 2 wks            (H69.83) Dysfunction of both eustachian tubes  Comment: explained about the condition  Plan: started on  fluticasone (FLONASE) 50 MCG/ACT nasal spray as directed.explained clearly about the medication,insructions and side effects.  Call or return to clinic prn if these symtoms worsen, fail to improve as anticipated, or if new symptoms develop.           Review of the result(s) of each unique test - CMP from 01/21     Return in about 19 days (around 3/24/2021) for BP Recheck.    Anne Randle MD  [facility] HOLLIS Mcgrath is a 60 year old who presents for the following health issues    HPI     Pt c/o pressure in bilateral ears since 1 week, no ear pain , no drainage,. No fever/chills. H/o seasonal allergies and not on ramone meds for this , no decreased hearing       Hypertension Follow-up      Do you check your blood pressure regularly outside of the clinic? No not on meds, BP has been running high for past few yrs per pt. No headache, chest pain, dizziness.    Are you following a low salt diet? No    Are your blood pressures ever more than 140 on the top number (systolic) OR more   than 90 on the bottom number (diastolic), for  "example 140/90? Yes       Past Medical History:   Diagnosis Date     Acid reflux      Asthma      Hiatal hernia          Current Outpatient Medications   Medication Sig Dispense Refill     amLODIPine (NORVASC) 10 MG tablet Take 1 tablet (10 mg) by mouth daily 30 tablet 0     fluticasone (FLONASE) 50 MCG/ACT nasal spray Spray 1 spray into both nostrils daily 9.9 mL 0     albuterol (PROAIR HFA/PROVENTIL HFA/VENTOLIN HFA) 108 (90 Base) MCG/ACT inhaler Inhale 2 puffs into the lungs every 6 hours 1 Inhaler 1          Review of Systems   CONSTITUTIONAL: NEGATIVE for fever, chills, change in weight  EYES: NEGATIVE for vision changes or irritation  RESP: NEGATIVE for significant cough or SOB  CV: NEGATIVE for chest pain, palpitations or peripheral edema  MUSCULOSKELETAL: NEGATIVE for significant arthralgias or myalgia  NEURO: NEGATIVE for weakness, dizziness or paresthesias      Objective    BP (!) 170/100   Pulse 109   Temp 97.9  F (36.6  C) (Oral)   Resp 17   Ht 1.575 m (5' 2\")   Wt 44.6 kg (98 lb 6.4 oz)   LMP  (LMP Unknown)   SpO2 98%   BMI 18.00 kg/m    Body mass index is 18 kg/m .  Physical Exam   GENERAL: healthy, alert and no distress  HENT: ear canals normal, bilateral bulging TM ,   RESP: lungs clear to auscultation - no rales, rhonchi or wheezes  CV: regular rate and rhythm,   MS: no gross musculoskeletal defects noted, no edema  NEURO: Normal strength and tone, mentation intact and speech normal                "

## 2021-03-05 NOTE — NURSING NOTE
"BP (!) 180/94   Pulse 109   Temp 97.9  F (36.6  C) (Oral)   Resp 17   Ht 1.575 m (5' 2\")   Wt 44.6 kg (98 lb 6.4 oz)   LMP  (LMP Unknown)   SpO2 98%   BMI 18.00 kg/m    Patient in for Rt ear pressure and concerns of HTN.  Meri Clay, CMA    "

## 2021-03-06 ENCOUNTER — NURSE TRIAGE (OUTPATIENT)
Dept: NURSING | Facility: CLINIC | Age: 61
End: 2021-03-06

## 2021-03-06 NOTE — TELEPHONE ENCOUNTER
Ramila said she was in recently for elevated B/P.  She is going to get started on the B/P meds today. She is noticing some slight bilateral cramping. She doesn't think her ankles are swollen. She denies one sided pain, swelling, warmth or redness to lower leg.    She is going to check her B/P at home and is also being mindful to restrict sodium. She will let us know of any increase in symptoms and will also follow up re B/P numbers.

## 2021-03-24 ENCOUNTER — APPOINTMENT (OUTPATIENT)
Dept: GENERAL RADIOLOGY | Facility: CLINIC | Age: 61
End: 2021-03-24
Attending: PHYSICIAN ASSISTANT
Payer: COMMERCIAL

## 2021-03-24 ENCOUNTER — HOSPITAL ENCOUNTER (INPATIENT)
Facility: CLINIC | Age: 61
LOS: 3 days | Discharge: HOME | End: 2021-03-27
Attending: PHYSICIAN ASSISTANT | Admitting: HOSPITALIST
Payer: COMMERCIAL

## 2021-03-24 ENCOUNTER — APPOINTMENT (OUTPATIENT)
Dept: CT IMAGING | Facility: CLINIC | Age: 61
End: 2021-03-24
Attending: PHYSICIAN ASSISTANT
Payer: COMMERCIAL

## 2021-03-24 ENCOUNTER — OFFICE VISIT (OUTPATIENT)
Dept: INTERNAL MEDICINE | Facility: CLINIC | Age: 61
End: 2021-03-24
Payer: COMMERCIAL

## 2021-03-24 VITALS
BODY MASS INDEX: 18.4 KG/M2 | TEMPERATURE: 97.8 F | OXYGEN SATURATION: 98 % | SYSTOLIC BLOOD PRESSURE: 180 MMHG | WEIGHT: 100 LBS | RESPIRATION RATE: 20 BRPM | HEIGHT: 62 IN | HEART RATE: 120 BPM | DIASTOLIC BLOOD PRESSURE: 96 MMHG

## 2021-03-24 DIAGNOSIS — I20.0 UNSTABLE ANGINA (H): ICD-10-CM

## 2021-03-24 DIAGNOSIS — R07.89 LEFT CHEST PRESSURE: ICD-10-CM

## 2021-03-24 DIAGNOSIS — Z72.0 TOBACCO ABUSE: ICD-10-CM

## 2021-03-24 DIAGNOSIS — R94.31 T WAVE INVERSION IN EKG: Primary | ICD-10-CM

## 2021-03-24 DIAGNOSIS — R07.9 CHEST PAIN: ICD-10-CM

## 2021-03-24 DIAGNOSIS — I10 ESSENTIAL HYPERTENSION: ICD-10-CM

## 2021-03-24 LAB
ANION GAP SERPL CALCULATED.3IONS-SCNC: 5 MMOL/L (ref 3–14)
APTT PPP: 32 SEC (ref 22–37)
BASOPHILS # BLD AUTO: 0.1 10E9/L (ref 0–0.2)
BASOPHILS NFR BLD AUTO: 0.9 %
BUN SERPL-MCNC: 11 MG/DL (ref 7–30)
CALCIUM SERPL-MCNC: 9.5 MG/DL (ref 8.5–10.1)
CHLORIDE SERPL-SCNC: 106 MMOL/L (ref 94–109)
CO2 SERPL-SCNC: 23 MMOL/L (ref 20–32)
CREAT SERPL-MCNC: 0.55 MG/DL (ref 0.52–1.04)
D DIMER PPP FEU-MCNC: 0.6 UG/ML FEU (ref 0–0.5)
DIFFERENTIAL METHOD BLD: NORMAL
EOSINOPHIL # BLD AUTO: 0.1 10E9/L (ref 0–0.7)
EOSINOPHIL NFR BLD AUTO: 1 %
ERYTHROCYTE [DISTWIDTH] IN BLOOD BY AUTOMATED COUNT: 12.5 % (ref 10–15)
GFR SERPL CREATININE-BSD FRML MDRD: >90 ML/MIN/{1.73_M2}
GLUCOSE SERPL-MCNC: 90 MG/DL (ref 70–99)
HBA1C MFR BLD: 5.2 % (ref 0–5.6)
HCT VFR BLD AUTO: 42.7 % (ref 35–47)
HGB BLD-MCNC: 14.7 G/DL (ref 11.7–15.7)
IMM GRANULOCYTES # BLD: 0 10E9/L (ref 0–0.4)
IMM GRANULOCYTES NFR BLD: 0.3 %
LABORATORY COMMENT REPORT: NORMAL
LYMPHOCYTES # BLD AUTO: 2.6 10E9/L (ref 0.8–5.3)
LYMPHOCYTES NFR BLD AUTO: 26.9 %
MAGNESIUM SERPL-MCNC: 2.5 MG/DL (ref 1.6–2.3)
MCH RBC QN AUTO: 31.5 PG (ref 26.5–33)
MCHC RBC AUTO-ENTMCNC: 34.4 G/DL (ref 31.5–36.5)
MCV RBC AUTO: 91 FL (ref 78–100)
MONOCYTES # BLD AUTO: 0.5 10E9/L (ref 0–1.3)
MONOCYTES NFR BLD AUTO: 4.6 %
NEUTROPHILS # BLD AUTO: 6.5 10E9/L (ref 1.6–8.3)
NEUTROPHILS NFR BLD AUTO: 66.3 %
NRBC # BLD AUTO: 0 10*3/UL
NRBC BLD AUTO-RTO: 0 /100
PLATELET # BLD AUTO: 279 10E9/L (ref 150–450)
POTASSIUM SERPL-SCNC: 3.9 MMOL/L (ref 3.4–5.3)
POTASSIUM SERPL-SCNC: 4.9 MMOL/L (ref 3.4–5.3)
RBC # BLD AUTO: 4.67 10E12/L (ref 3.8–5.2)
SARS-COV-2 RNA RESP QL NAA+PROBE: NEGATIVE
SODIUM SERPL-SCNC: 134 MMOL/L (ref 133–144)
SPECIMEN SOURCE: NORMAL
TROPONIN I SERPL-MCNC: <0.015 UG/L (ref 0–0.04)
UFH PPP CHRO-ACNC: 0.28 IU/ML
WBC # BLD AUTO: 9.8 10E9/L (ref 4–11)

## 2021-03-24 PROCEDURE — 99223 1ST HOSP IP/OBS HIGH 75: CPT | Mod: AI | Performed by: HOSPITALIST

## 2021-03-24 PROCEDURE — 87635 SARS-COV-2 COVID-19 AMP PRB: CPT | Performed by: PHYSICIAN ASSISTANT

## 2021-03-24 PROCEDURE — 84132 ASSAY OF SERUM POTASSIUM: CPT | Performed by: HOSPITALIST

## 2021-03-24 PROCEDURE — 99285 EMERGENCY DEPT VISIT HI MDM: CPT | Mod: 25

## 2021-03-24 PROCEDURE — 71275 CT ANGIOGRAPHY CHEST: CPT

## 2021-03-24 PROCEDURE — 120N000001 HC R&B MED SURG/OB

## 2021-03-24 PROCEDURE — 96375 TX/PRO/DX INJ NEW DRUG ADDON: CPT

## 2021-03-24 PROCEDURE — 250N000011 HC RX IP 250 OP 636: Performed by: PHYSICIAN ASSISTANT

## 2021-03-24 PROCEDURE — 93000 ELECTROCARDIOGRAM COMPLETE: CPT | Performed by: INTERNAL MEDICINE

## 2021-03-24 PROCEDURE — 36415 COLL VENOUS BLD VENIPUNCTURE: CPT | Performed by: HOSPITALIST

## 2021-03-24 PROCEDURE — 250N000013 HC RX MED GY IP 250 OP 250 PS 637: Performed by: HOSPITALIST

## 2021-03-24 PROCEDURE — 80048 BASIC METABOLIC PNL TOTAL CA: CPT | Performed by: EMERGENCY MEDICINE

## 2021-03-24 PROCEDURE — 85730 THROMBOPLASTIN TIME PARTIAL: CPT | Performed by: PHYSICIAN ASSISTANT

## 2021-03-24 PROCEDURE — 96376 TX/PRO/DX INJ SAME DRUG ADON: CPT

## 2021-03-24 PROCEDURE — 84484 ASSAY OF TROPONIN QUANT: CPT | Performed by: HOSPITALIST

## 2021-03-24 PROCEDURE — 85379 FIBRIN DEGRADATION QUANT: CPT | Performed by: PHYSICIAN ASSISTANT

## 2021-03-24 PROCEDURE — 83036 HEMOGLOBIN GLYCOSYLATED A1C: CPT | Performed by: EMERGENCY MEDICINE

## 2021-03-24 PROCEDURE — C9803 HOPD COVID-19 SPEC COLLECT: HCPCS

## 2021-03-24 PROCEDURE — 71046 X-RAY EXAM CHEST 2 VIEWS: CPT

## 2021-03-24 PROCEDURE — 93005 ELECTROCARDIOGRAM TRACING: CPT

## 2021-03-24 PROCEDURE — 250N000009 HC RX 250: Performed by: PHYSICIAN ASSISTANT

## 2021-03-24 PROCEDURE — 85730 THROMBOPLASTIN TIME PARTIAL: CPT | Performed by: EMERGENCY MEDICINE

## 2021-03-24 PROCEDURE — 85520 HEPARIN ASSAY: CPT | Performed by: HOSPITALIST

## 2021-03-24 PROCEDURE — 999N000156 HC STATISTIC RCP CONSULT EA 30 MIN

## 2021-03-24 PROCEDURE — 96365 THER/PROPH/DIAG IV INF INIT: CPT | Mod: 59

## 2021-03-24 PROCEDURE — 99207 PR INPT ADMISSION FROM CLINIC: CPT | Performed by: INTERNAL MEDICINE

## 2021-03-24 PROCEDURE — 84484 ASSAY OF TROPONIN QUANT: CPT | Performed by: EMERGENCY MEDICINE

## 2021-03-24 PROCEDURE — 93005 ELECTROCARDIOGRAM TRACING: CPT | Mod: 76

## 2021-03-24 PROCEDURE — 83735 ASSAY OF MAGNESIUM: CPT | Performed by: EMERGENCY MEDICINE

## 2021-03-24 PROCEDURE — 250N000013 HC RX MED GY IP 250 OP 250 PS 637: Performed by: PHYSICIAN ASSISTANT

## 2021-03-24 PROCEDURE — 85025 COMPLETE CBC W/AUTO DIFF WBC: CPT | Performed by: EMERGENCY MEDICINE

## 2021-03-24 RX ORDER — ALBUTEROL SULFATE 90 UG/1
2 AEROSOL, METERED RESPIRATORY (INHALATION) EVERY 6 HOURS
Status: DISCONTINUED | OUTPATIENT
Start: 2021-03-24 | End: 2021-03-24

## 2021-03-24 RX ORDER — NITROGLYCERIN 0.4 MG/1
0.4 TABLET SUBLINGUAL EVERY 5 MIN PRN
Status: DISCONTINUED | OUTPATIENT
Start: 2021-03-24 | End: 2021-03-26

## 2021-03-24 RX ORDER — LIDOCAINE 40 MG/G
CREAM TOPICAL
Status: DISCONTINUED | OUTPATIENT
Start: 2021-03-24 | End: 2021-03-27 | Stop reason: HOSPADM

## 2021-03-24 RX ORDER — ONDANSETRON 4 MG/1
4 TABLET, ORALLY DISINTEGRATING ORAL EVERY 6 HOURS PRN
Status: DISCONTINUED | OUTPATIENT
Start: 2021-03-24 | End: 2021-03-26

## 2021-03-24 RX ORDER — HEPARIN SODIUM 10000 [USP'U]/100ML
0-5000 INJECTION, SOLUTION INTRAVENOUS CONTINUOUS
Status: DISCONTINUED | OUTPATIENT
Start: 2021-03-24 | End: 2021-03-26

## 2021-03-24 RX ORDER — FLUTICASONE PROPIONATE 50 MCG
1 SPRAY, SUSPENSION (ML) NASAL DAILY PRN
Status: DISCONTINUED | OUTPATIENT
Start: 2021-03-24 | End: 2021-03-27 | Stop reason: HOSPADM

## 2021-03-24 RX ORDER — ASPIRIN 81 MG/1
324 TABLET, CHEWABLE ORAL ONCE
Status: COMPLETED | OUTPATIENT
Start: 2021-03-24 | End: 2021-03-24

## 2021-03-24 RX ORDER — ASPIRIN 81 MG/1
81 TABLET ORAL DAILY
Status: DISCONTINUED | OUTPATIENT
Start: 2021-03-25 | End: 2021-03-26

## 2021-03-24 RX ORDER — ATORVASTATIN CALCIUM 40 MG/1
40 TABLET, FILM COATED ORAL EVERY EVENING
Status: DISCONTINUED | OUTPATIENT
Start: 2021-03-24 | End: 2021-03-27 | Stop reason: HOSPADM

## 2021-03-24 RX ORDER — ALBUTEROL SULFATE 90 UG/1
2 AEROSOL, METERED RESPIRATORY (INHALATION) EVERY 6 HOURS PRN
Status: DISCONTINUED | OUTPATIENT
Start: 2021-03-24 | End: 2021-03-27 | Stop reason: HOSPADM

## 2021-03-24 RX ORDER — ONDANSETRON 2 MG/ML
4 INJECTION INTRAMUSCULAR; INTRAVENOUS EVERY 6 HOURS PRN
Status: DISCONTINUED | OUTPATIENT
Start: 2021-03-24 | End: 2021-03-26

## 2021-03-24 RX ORDER — IOPAMIDOL 755 MG/ML
500 INJECTION, SOLUTION INTRAVASCULAR ONCE
Status: COMPLETED | OUTPATIENT
Start: 2021-03-24 | End: 2021-03-24

## 2021-03-24 RX ORDER — METOPROLOL TARTRATE 1 MG/ML
5 INJECTION, SOLUTION INTRAVENOUS ONCE
Status: COMPLETED | OUTPATIENT
Start: 2021-03-24 | End: 2021-03-24

## 2021-03-24 RX ORDER — FLUTICASONE PROPIONATE 50 MCG
1 SPRAY, SUSPENSION (ML) NASAL DAILY PRN
COMMUNITY
End: 2021-04-13

## 2021-03-24 RX ORDER — ACETAMINOPHEN 325 MG/1
650 TABLET ORAL EVERY 4 HOURS PRN
Status: DISCONTINUED | OUTPATIENT
Start: 2021-03-24 | End: 2021-03-26

## 2021-03-24 RX ORDER — AMLODIPINE BESYLATE 10 MG/1
10 TABLET ORAL DAILY
Status: DISCONTINUED | OUTPATIENT
Start: 2021-03-24 | End: 2021-03-25

## 2021-03-24 RX ADMIN — AMLODIPINE BESYLATE 10 MG: 10 TABLET ORAL at 19:42

## 2021-03-24 RX ADMIN — ACETAMINOPHEN 650 MG: 325 TABLET, FILM COATED ORAL at 22:50

## 2021-03-24 RX ADMIN — SODIUM CHLORIDE 77 ML: 9 INJECTION, SOLUTION INTRAVENOUS at 17:20

## 2021-03-24 RX ADMIN — NITROGLYCERIN 0.4 MG: 0.4 TABLET SUBLINGUAL at 15:33

## 2021-03-24 RX ADMIN — IOPAMIDOL 52 ML: 755 INJECTION, SOLUTION INTRAVENOUS at 17:20

## 2021-03-24 RX ADMIN — ATORVASTATIN CALCIUM 40 MG: 40 TABLET, FILM COATED ORAL at 19:42

## 2021-03-24 RX ADMIN — ASPIRIN 81 MG CHEWABLE TABLET 324 MG: 81 TABLET CHEWABLE at 15:05

## 2021-03-24 RX ADMIN — HEPARIN SODIUM 550 UNITS/HR: 10000 INJECTION, SOLUTION INTRAVENOUS at 16:33

## 2021-03-24 RX ADMIN — NITROGLYCERIN 0.4 MG: 0.4 TABLET SUBLINGUAL at 15:15

## 2021-03-24 RX ADMIN — METOPROLOL TARTRATE 5 MG: 1 INJECTION, SOLUTION INTRAVENOUS at 15:58

## 2021-03-24 ASSESSMENT — ENCOUNTER SYMPTOMS
DIAPHORESIS: 0
VOMITING: 0
NAUSEA: 0
SHORTNESS OF BREATH: 1

## 2021-03-24 ASSESSMENT — MIFFLIN-ST. JEOR
SCORE: 971.85
SCORE: 963.25
SCORE: 958.24

## 2021-03-24 ASSESSMENT — ACTIVITIES OF DAILY LIVING (ADL): ADLS_ACUITY_SCORE: 11

## 2021-03-24 NOTE — Clinical Note
Stent deployed in the proximal right coronary artery. Max pressure = 12 mike. Total duration = 17 seconds. Balloon reinflated a second time: Max pressure = 14 mike. Total duration = 20 seconds.

## 2021-03-24 NOTE — PROGRESS NOTES
Assessment & Plan     (I20.0) Unstable angina (H)  (R94.31) T wave inversion in EKG     (R07.89) Left chest pressure  Plan: pt has symptoms of on and off chest pressure and lt arm tingling and numbness with new T inversions noted in EKG compared to previous EKG from 01/21. I contacted ER and pt was sent to ER for hospitalization and management .   EKG reviewed-T inversions noted in inferior and anterolateral leads, new changes compared to previous EKG of January 2021.  Discussed with pt about the nature of her chest pain, elevated BP and new EKG changes concerning for ischemia, pt was advised ER eval and hospitalization, I called ER and notified ER personal, and Pt was wheeled to ER for further evaluation and management.     (I10) Essential hypertension  Plan: BP high , on amlodipine 10 mg daily, pt sent to ER     (Z72.0) Tobacco abuse  Plan: counseled on smoking cessation ,       Review of the result(s) of each unique test -  Reviewed Troponin ( ER)  Independent interpretation of a test   EKG- new T inversions in inferolateral leads         Tobacco Cessation:   reports that she has been smoking. She has a 40.00 pack-year smoking history. She has never used smokeless tobacco.  Tobacco Cessation Action Plan: Information offered: Patient not interested at this time     Return in about 13 days (around 4/6/2021).    Anne Randle MD  [facility] HOLLIS Mcgrath is a 61 year old who presents for the following health issues    HPI     Pt  is a 61 year old female who is seen here to day with c/o Lt arm nubness /aching and tingling since yesterday,which lasted for 2 hrs and again started this morning.,  Pt says she was having chest pressure on and off since 2 wks with radiation to lt arm, no n/v,no diaphoresis,  no chest pain currently,  Had SOB with chest pressure. no palpitations, has h/o smoking 1ppd for 40 yrs..       Hypertension Follow-up      Do you check your blood  "pressure regularly outside of the clinic? No , patient was started on amlodipine 10 mg 1 tablet daily at last office visit tolerating well.    Are you following a low salt diet? Yes    Are your blood pressures ever more than 140 on the top number (systolic) OR more   than 90 on the bottom number (diastolic), for example 140/90? Yes      How many servings of fruits and vegetables do you eat daily?  0-1    On average, how many sweetened beverages do you drink each day (Examples: soda, juice, sweet tea, etc.  Do NOT count diet or artificially sweetened beverages)?   5    How many days per week do you exercise enough to make your heart beat faster? 3 or less    How many minutes a day do you exercise enough to make your heart beat faster? 9 or less    How many days per week do you miss taking your medication? 0      Past Medical History:   Diagnosis Date     Acid reflux      Asthma      Hiatal hernia        Current Outpatient Medications   Medication Sig Dispense Refill     albuterol (PROAIR HFA/PROVENTIL HFA/VENTOLIN HFA) 108 (90 Base) MCG/ACT inhaler Inhale 2 puffs into the lungs every 6 hours 1 Inhaler 1     amLODIPine (NORVASC) 10 MG tablet Take 1 tablet (10 mg) by mouth daily 30 tablet 0     fluticasone (FLONASE) 50 MCG/ACT nasal spray Spray 1 spray into both nostrils daily 9.9 mL 0       Review of Systems   CONSTITUTIONAL: NEGATIVE for fever, chills, change in weight  EYES: NEGATIVE for vision changes or irritation  RESP: NEGATIVE for significant cough or SOB  CV:chest pressure and tingling left arm  MUSCULOSKELETAL: NEGATIVE for significant arthralgias or myalgia  NEURO: NEGATIVE for weakness, dizziness or paresthesias      Objective    BP (!) 180/96   Pulse 120   Temp 97.8  F (36.6  C) (Oral)   Resp 20   Ht 1.575 m (5' 2\")   Wt 45.4 kg (100 lb)   LMP  (LMP Unknown)   SpO2 98%   BMI 18.29 kg/m    Body mass index is 18.29 kg/m .  Recheck blood pressure 170/90  Physical Exam   GENERAL:   alert and no " distress  EYES: Eyes grossly normal to inspection, PERRL and conjunctivae and sclerae normal  NECK: no adenopathy, no asymmetry, masses, or scars and thyroid normal to palpation  RESP: lungs clear to auscultation - no rales, rhonchi or wheezes  CV: regular rate and rhythm,   ABDOMEN: soft, nontender,   and bowel sounds normal  MS: no gross musculoskeletal defects noted, no edema  NEURO: Normal strength and tone, mentation intact and speech normal  PSYCH: mentation appears normal, affect normal/bright

## 2021-03-24 NOTE — ED NOTES
"[facility]  ED Nurse Handoff Report    Ramila Dietrich is a 61 year old female   ED Chief complaint: Chest Pain  . ED Diagnosis:   Final diagnoses:   Chest pain   Unstable angina (H) - Concern for     Allergies:   Allergies   Allergen Reactions     Erythromycin      Aminophylline      \"got jittery\"       Code Status: Full Code  Activity level - Baseline/Home:  Independent. Activity Level - Current:   Stand by Assist. Lift room needed: No. Bariatric: No   Needed: No   Isolation: No. Infection: Not Applicable.     Vital Signs:   Vitals:    03/24/21 1600 03/24/21 1613 03/24/21 1615 03/24/21 1620   BP: (!) 149/95  (!) 141/106 (!) 149/107   Pulse: 89  79 74   Resp: 20  18    Temp:       TempSrc:       SpO2: 100%  98% 97%   Weight:  44.5 kg (98 lb 1.7 oz)     Height:  1.575 m (5' 2\")         Cardiac Rhythm:  ,      Pain level:    Patient confused: No. Patient Falls Risk: Yes.   Elimination Status: Has voided   Patient Report - Initial Complaint: Chest pain, left arm tingling. Focused Assessment: Presents to ED with 3 days of chest pressure and left arm tingling. Pt states she has had a lot of stress in her life recently, and unsure if it could be related to anxiety. Pt is a smoker. Pt given 4 baby aspirin, 2 nitroglycerin, IV metoprolol and started on heparin drip.  Chest pressure is improved, but still rates it a 1 or 2. ABCs intact. A&OX4.   Tests Performed: Labs, EKG, chest xray. Abnormal Results:   Labs Ordered and Resulted from Time of ED Arrival Up to the Time of Departure from the ED   D DIMER QUANTITATIVE - Abnormal; Notable for the following components:       Result Value    D Dimer 0.6 (*)     All other components within normal limits   CBC WITH PLATELETS DIFFERENTIAL   BASIC METABOLIC PANEL   TROPONIN I   SARS-COV-2 (COVID-19) VIRUS RT-PCR   PARTIAL THROMBOPLASTIN TIME   MEASURE WEIGHT   NOTIFY PHYSICIAN   NOTIFY PHYSICIAN     Chest XR,  PA & LAT   Final Result   IMPRESSION: AP and lateral " views of the chest were obtained.   Cardiomediastinal silhouette is within normal limits. No suspicious   focal pulmonary opacities. No significant pleural effusion or   pneumothorax.       RADHA GRAJEDA MD      CT Chest Pulmonary Embolism w Contrast    (Results Pending)     .   Treatments provided: See MAR. Heparin drip running.   Family Comments:  here.   OBS brochure/video discussed/provided to patient:  N/A  ED Medications:   Medications   nitroGLYcerin (NITROSTAT) sublingual tablet 0.4 mg (0.4 mg Sublingual Given 3/24/21 1533)   heparin 25,000 units in 0.45% NaCl 250 mL ANTICOAGULANT infusion (550 Units/hr Intravenous New Bag 3/24/21 1633)   aspirin (ASA) chewable tablet 324 mg (324 mg Oral Given 3/24/21 1505)   metoprolol (LOPRESSOR) injection 5 mg (5 mg Intravenous Given 3/24/21 1558)   heparin loading dose for LOW INTENSITY TREATMENT * Give BEFORE starting heparin infusion (2,700 Units Intravenous Given 3/24/21 1635)     Drips infusing:  Yes - heparin  For the majority of the shift, the patient's behavior Green. Interventions performed were N/A.    Sepsis treatment initiated: No     Patient tested for COVID 19 prior to admission: YES    ED Nurse Name/Phone Number: Su Vazquez RN,   5:00 PM    RECEIVING UNIT ED HANDOFF REVIEW    Above ED Nurse Handoff Report was reviewed: Yes  Reviewed by: Elsa Meyers RN on March 24, 2021 at 5:38 PM

## 2021-03-24 NOTE — ED TRIAGE NOTES
Pt arrives with chest pain for 1 week with tingling down L arm. Pt was sent in by PCP today for it. States some SOB. ABCs intact.

## 2021-03-24 NOTE — Clinical Note
The first balloon was inserted into the right coronary artery and proximal right coronary artery.Max pressure = 14 mike. Total duration = 23 seconds.     Max pressure = 12 mike. Total duration = 20 seconds.    Balloon reinflated a second time: Max pressure = 12 mike. Total duration = 20 seconds.  Balloon reinflated a third time: Max pressure = 10 mike. Total duration = 20 seconds.

## 2021-03-24 NOTE — H&P
[facility]    History and Physical  Hospitalist       Date of Admission:  3/24/2021    Assessment & Plan   Ramila Dietrich is a 61 year old female with a past medical history of asthma, HTN, tobacco use disorder, GERD who presents with chest pain.    #Chest pain, concern for unstable angina: Ramila notes that she started to have some left-sided chest pain/pressure radiating to the left shoulder and down the arm about 2 weeks ago.  She was not doing anything active at the time.  Lasted a couple hours then went away.  She then has been noticing the same intermittent chest pain/pressure over the last week.  Associated with shortness of breath.  Patient initially saw her PCP obtained an EKG that showed new T wave inversions in inferolateral leads and she was sent to the ED for evaluation.  She is a current smoker and smokes 1 pack/day.  She does note that she has been under stress over the last several weeks due to social stressors involving her son.  Her mother did have a heart attack in her 70's.  She denies any alcohol use or illicit drug use.  -ED, patient afebrile tachycardic to 120 initially and hypertensive.  Labs notable for normal BMP.  CBC unremarkable.  Troponin negative.  D dimer was positive.  Chest x-ray without any acute CP process noted.  EKG obtained showed T wave inversions some inferior and anterolateral leads.  Case was discussed with cardiology and patient was started on a heparin drip, given aspirin.  CT PE was obtained and in process.  -Follow up CT PE, if positive then will need to change heparin drip dosing.  -Continue treatment for unstable angina.  Continue heparin drip.  Daily aspirin.  Start statin 40 mg daily.  NTG prn.  Check lipid profile and A1c in the a.m.  -Telemetry, TTE.  Trend troponins.    -Cardiology consulted.  NPO at [facility] pending cards eval.     #Tobacco use d/o: Smokes 1 pack/day.  Declines nicotine replacement at this time.  Advised cessation.    #HTN:  Continue amlodipine.  Hydralazine prn.     #Asthma: No signs of exacerbation.  Continue albuterol prn    DVT Prophylaxis: Hep gtt  Code Status: Full Code  Dispo: Admit to inpatient     Jani Ramos MD    Primary Care Physician   [facility]    Chief Complaint   Chest Pain    History is obtained from the patient, patient's chart discussed with ER provider    History of Present Illness   Ramila Dietrich is a 61 year old female with a past medical history of asthma, HTN, tobacco use disorder, GERD who presents with chest pain.    Ramila notes that she started to have some left-sided chest pain/pressure radiating to the left shoulder and down the arm about 2 weeks ago.  She was not doing anything active at the time.  Lasted a couple hours then went away.  She then has been noticing the same intermittent chest pain/pressure over the last week.  She endorses chest pressure with left arm pain and shortness of breath.  No associated nausea or vomiting.  No diaphoresis.  She took baby aspirin yesterday evening for her symptoms.  She cannot point out any alleviating or exacerbating factors.  Patient initially saw her PCP obtained an EKG that showed T wave inversions and she was sent to the ED for evaluation.  She is a current smoker and smokes 1 pack/day.  She does note that she has been under stress over the last several weeks due to social stressors involving her son.  Her mother did have a heart attack in her 70's.  She denies any alcohol use or illicit drug use.    In the ED, patient afebrile tachycardic to 120 initially and hypertensive.  Labs notable for normal BMP.  CBC unremarkable.  Troponin negative.  Chest x-ray without any acute CP process noted.  EKG obtained showed T wave inversions some inferior and anterolateral leads.  Case was discussed with cardiology and patient was started on a heparin drip, given aspirin.  CT PE was obtained and in process.    Past Medical History    I have reviewed this  "patient's medical history and updated it with pertinent information if needed.   Past Medical History:   Diagnosis Date     Acid reflux      Asthma      Hiatal hernia        Past Surgical History   I have reviewed this patient's surgical history and updated it with pertinent information if needed.  Past Surgical History:   Procedure Laterality Date     APPENDECTOMY        SECTION      x5     ORTHOPEDIC SURGERY       TONSILLECTOMY         Prior to Admission Medications   Prior to Admission Medications   Prescriptions Last Dose Informant Patient Reported? Taking?   albuterol (PROAIR HFA/PROVENTIL HFA/VENTOLIN HFA) 108 (90 Base) MCG/ACT inhaler   No No   Sig: Inhale 2 puffs into the lungs every 6 hours   amLODIPine (NORVASC) 10 MG tablet   No No   Sig: Take 1 tablet (10 mg) by mouth daily   fluticasone (FLONASE) 50 MCG/ACT nasal spray   No No   Sig: Spray 1 spray into both nostrils daily      Facility-Administered Medications: None     Allergies   Allergies   Allergen Reactions     Erythromycin      Aminophylline      \"got jittery\"       Social History   I have reviewed this patient's social history and updated it with pertinent information if needed. Ramila Dietrich  reports that she has been smoking. She has a 40.00 pack-year smoking history. She has never used smokeless tobacco. She reports that she does not drink alcohol or use drugs.    Family History   I have reviewed this patient's family history and updated it with pertinent information if needed.   Family History   Problem Relation Age of Onset     Heart Disease Mother          73yo [place] emphysema smoker     Cancer Father          87yo lung smoker     Family History Negative Sister 32        lung issue     Family History Negative Son      Family History Negative Son      Family History Negative Son      Family History Negative Daughter      Neurologic Disorder Daughter 23        MS     Cancer Maternal Grandfather          age? throat " smoker       Review of Systems   The 10 point Review of Systems is negative other than noted in the HPI or here.     Physical Exam   Temp: 97  F (36.1  C) Temp src: Temporal BP: (!) 149/107 Pulse: 74   Resp: 18 SpO2: 97 %      Vital Signs with Ranges  Temp:  [97  F (36.1  C)-97.8  F (36.6  C)] 97  F (36.1  C)  Pulse:  [] 74  Resp:  [17-20] 18  BP: (141-180)/() 149/107  SpO2:  [97 %-100 %] 97 %  98 lbs 1.68 oz    Constitutional: Thin, NAD.  Conversational. Boyfriend at bedside.   HEENT: Normocephalic, MMM, no elevation of JVD noted  Respiratory: Nl WOB, Clear bilaterally, No wheezes, no crackles  Cardiovascular: Regular, no murmur  GI: BS+, NT, ND, no rebound or guarding  Lymph/Hematologic: No bruising. No cervical LAD  Skin: No rash  Musculoskeletal: Nl Tone, No edema noted  Neurologic: A&Ox3, Answers appropriately. CNII-XII intact. Moves all extremities. No tremor  Psychiatric: A bit anxious    Data   Data reviewed today:  I personally reviewed  Recent Labs   Lab 03/24/21  1506   WBC 9.8   HGB 14.7   MCV 91         POTASSIUM 4.9   CHLORIDE 106   CO2 23   BUN 11   CR 0.55   ANIONGAP 5   REJI 9.5   GLC 90   TROPI <0.015       Recent Results (from the past 24 hour(s))   Chest XR,  PA & LAT    Narrative    CHEST TWO VIEW   3/24/2021 3:55 PM     HISTORY: Chest pain.    COMPARISON: Chest x-ray on 2/20/2019.      Impression    IMPRESSION: AP and lateral views of the chest were obtained.  Cardiomediastinal silhouette is within normal limits. No suspicious  focal pulmonary opacities. No significant pleural effusion or  pneumothorax.     RADHA GRAJEDA MD

## 2021-03-24 NOTE — PHARMACY-ADMISSION MEDICATION HISTORY
Admission medication history interview status for this patient is complete. See [facility] admission navigator for allergy information, prior to admission medications and immunization status.     Medication history interview done, indicate source(s): Patient  Medication history resources (including written lists, pill bottles, clinic record):EPIC    Changes made to PTA medication list:  Added: none  Deleted: none  Changed: Flonase and Albuterol to prn    Medication reconciliation/reorder completed by provider prior to medication history?  Y     Prior to Admission medications    Medication Sig Last Dose Taking? Auth Provider   albuterol (PROAIR HFA/PROVENTIL HFA/VENTOLIN HFA) 108 (90 Base) MCG/ACT inhaler Inhale 2 puffs into the lungs every 6 hours  Patient taking differently: Inhale 2 puffs into the lungs every 6 hours as needed  ~2weeks ago Yes Sury Pantoja PA-C   amLODIPine (NORVASC) 10 MG tablet Take 1 tablet (10 mg) by mouth daily 3/23/2021 at 14:30 Yes Anne Randle MD   fluticasone (FLONASE) 50 MCG/ACT nasal spray Spray 1 spray into both nostrils daily as needed for rhinitis or allergies Past Week at Unknown time Yes Unknown, Entered By History

## 2021-03-24 NOTE — Clinical Note
Stent deployed in the middle right coronary artery. Max pressure = 10 mike. Total duration = 30 seconds. Balloon reinflated a second time: Max pressure = 8 mike. Total duration = 15 seconds. 2nd Inflation at the Prox RCABalloon reinflated a third time: Max pressure = 8 mike. Total duration = 10 seconds. 3rd inflation at Prox RCA

## 2021-03-24 NOTE — ED PROVIDER NOTES
History   Chief Complaint  Chest Pain    HPI  Ramila Dietrich is a 61 year old female with a history of asthma and acid reflux who presents for evaluation of chest pain. The patient reports that she has been experiencing intermittent chest pressure for the past week or so. Yesterday, this pressure became constant and was associated with new aching pain in her left arm, both of which have been persistent throughout today. The patient was evaluated by her PCP today for followup after starting Amlodipine. They performed an EKG and were concerned over some T wave inversions, prompting her referral to the ED. Here, the patient endorses chest pressure, left arm pain, and some shortness of breath. Denies any nausea, vomiting, or diaphoresis. She last took a baby Aspirin yesterday evening. Nothing exacerbates or alleviates her symptoms.  No prior history of DVT or PE.    CARDIAC RISK FACTORS:  Sex:    female  Tobacco:   positive  Hypertension:   positive  Hyperlipidemia:  negative  Diabetes:   negative  Family History:  Positive, mother    Review of Systems   Constitutional: Negative for diaphoresis.   Respiratory: Positive for shortness of breath.    Cardiovascular: Positive for chest pain (radiates into left arm).   Gastrointestinal: Negative for nausea and vomiting.   All other systems reviewed and are negative.    Allergies  Aminophylline  Erythromycin    Medications  Albuterol  Amlodipine  Flonase    Past Medical History  Acid reflux  Asthma  Hiatal hernia  Hemorrhagic colitis  Gastroenteritis    Past Surgical History  Appendectomy    Orthopedic surgery  Tonsillectomy    Family History  MI  Cancer    Social History  Presents to the ED with her boyfriend.   Tobacco use: pack a day    Physical Exam     Patient Vitals for the past 24 hrs:   BP Temp Temp src Pulse Resp SpO2 Height Weight   21 1620 (!) 149/107 -- -- 74 -- 97 % -- --   21 1615 (!) 141/106 -- -- 79 18 98 % -- --   21 1613 -- -- -- --  "-- -- 1.575 m (5' 2\") 44.5 kg (98 lb 1.7 oz)   03/24/21 1600 (!) 149/95 -- -- 89 20 100 % -- --   03/24/21 1530 (!) 155/96 -- -- 86 17 97 % -- --   03/24/21 1515 (!) 158/106 -- -- 92 18 99 % -- --   03/24/21 1500 (!) 161/106 -- -- 98 -- 100 % -- --   03/24/21 1426 (!) 176/96 97  F (36.1  C) Temporal 103 18 100 % -- --     Physical Exam  General: Awake, alert, pleasant, non-toxic.  Head:  Scalp is NC/AT  Eyes:  Conjunctiva normal, PERRL  ENT:  The external nose and ears are normal.   Neck:  Normal range of motion without rigidity.  CV:  Regular rate and rhythm    No pathologic murmur, rubs, or gallops.  Resp:  Breath sounds are clear bilaterally.  No crackles, wheezes, rhonchi, stridor.    Non-labored, no retractions or accessory muscle use  Abdomen: Abdomen is soft, no distension, no tenderness, no masses. No CVA tenderness.  MS:  No lower extremity edema or asymmetric calf swelling. Normal ROM in all joints without effusions.  Skin:  Warm and dry, No rash or lesions noted. 2+ peripheral pulses in all extremities  Neuro: Alert and oriented x3.  5/5 strength BL in UE and LE, normal sensation to touch.  Cranial nerves 2-12 intact.  Psych: Awake. Alert. Normal affect. Appropriate interactions.    Emergency Department Course   ECG:   ECG taken at 1431, ECG read at 1431  Normal sinus rhythm. T wave abnormality, consider inferolateral ischemia. Abnormal ECG.  Rate 91 bpm. TN interval 148 ms. QRS duration 82 ms. QT/QTc 378/464 ms. P-R-T axes 64 79 -74.    ECG taken at 1532, ECG read at 1538  Normal sinus rhythm. T wave abnormality, consider inferolateral ischemia. Abnormal ECG.   Rate 83 bpm. TN interval 156 ms. QRS duration 52 ms. QT/QTc 392/460 ms. P-R-T axes 55 77 -76.    Imaging:  XR Chest PA & LAT:   AP and lateral views of the chest were obtained. Cardiomediastinal silhouette is within normal limits. No suspicious focal pulmonary opacities. No significant pleural effusion or pneumothorax.  as per radiology.     CT " Chest PE w/ IV contrast:   Pending on admission.    Laboratory:  CBC: WBC: 9.8, HGB: 14.7, PLT: 279  BMP: All WNL (Creatinine: 0.55)  1506 Troponin I: <0.015  D dimer: 0.6 (H)  PTT: 32  Asymptomatic COVID: pending    Emergency Department Course:  Reviewed:  I reviewed nursing notes, vitals and past medical history    Assessments:  1448 I physically examined the patient as documented above.    1532 I rechecked the patient. She is still having chest pain at this time.     1607 I rechecked the patient. Her pain is gone at this time.     Consults:   1557 I consulted with Dr. Vera, on call for cardiology, regarding the patient's history and presentation here in the emergency department.    1640 I consulted with Dr. Ramos, on call hospitalist, regarding the patient's history and presentation here in the emergency department.    Interventions:  1505  mg PO  1515 Nitrostat 0.4 mg sublingual   1533 Nitrostat 0.4 mg sublingual  1558 Lopressor 5 mg IV  1633 heparin 25,000 units in 0.45% NaCl 250 mL ANTICOAGULANT infusion    Disposition:  The patient was admitted to the hospital under the care of Dr. Ramos.     Impression & Plan   Medical Decision Makin-year-old female presents with chest and arm pain.  Broad differential considered.  EKG demonstrates new T wave inversions in V4 V5 V6.    Given aspirin, nitroglycerin, metoprolol pain-free at this time.  Reassuringly initial troponin is negative.  However in conjunction with her symptoms and risk factors concern for unstable angina.  Discussed with cardiology will initiate on heparin drip.  Pain-free at this time.  D-dimer mildly elevated though fortunately no evidence of pulmonary embolism or aortic dissection does show mild interstitial edema in lower lungs of unclear significance but could represent some CHF.  No hypoxia, respiratory distress, or symptoms of overt fluid overload on exam.  No pneumonia symptoms.  She will be admitted the hospital for further  evaluation.  Discussed with hospitalist who agrees to accept the patient to cardiac telemetry bed.  Diagnosis:    ICD-10-CM    1. Chest pain  R07.9 Partial thromboplastin time     Partial thromboplastin time     CANCELED: Partial thromboplastin time   2. Unstable angina (H)  I20.0     Concern for     Scribe Disclosure:  I, Hamzah Dykes, am serving as a scribe at 2:42 PM on 3/24/2021 to document services personally performed by Jason Gardiner, * based on my observations and the provider's statements to me.      Jason Gardiner, DIEGO  03/24/21 1811

## 2021-03-24 NOTE — Clinical Note
The first balloon was inserted into the right coronary artery and middle right coronary artery.Max pressure = 6 mike. Total duration = 20 seconds.     Max pressure = 8 mike. Total duration = 15 seconds.    Balloon reinflated a second time: Max pressure = 8 mike. Total duration = 15 seconds.  Balloon reinflated a third time: Max pressure = 8 mike. Total duration = 15 seconds.  Balloon reinflated a fourth time: Max pressure = 8 mike. Total duration = 15 seconds.

## 2021-03-24 NOTE — ED NOTES
Emergency Department Attending Supervision Note  2/21/2018  4:46 PM      I evaluated this patient in conjunction with Jason Gardiner PA-C      Briefly,  61 year old female with chest pain.  Initially intermittent now constant not clearly exertional pattern.  At clinic today had an ECG showing T wave changes and so referred to the emergency department.  No history of lower extremity edema VTE unknown known coronary disease.  States she has been under a lot of stress recently.        On my exam,         HENT:  mmm, no rhinorrhea  Eyes: periorbital tissues and sclera normal   Neck: supple, no abnormal swelling  Lungs:  CTAB,  no resp distress  CV: rrr, no m/r/g, ppi  Abd: soft, nontender, nondistended, no rebound/masses/guarding/hsm  Ext: no peripheral edema  Skin: warm, dry, well perfused, no rashes/bruising/lesions on exposed skin  Neuro: alert, MAEE, no gross motor or sensory deficits, gait stable  Psych: Normal mood, normal affect    ECG numbers 1 and 2 both showing a normal sinus rhythm with T wave inversion in the inferior leads as well as the lateral precordial leads.  There is no significant ST segment deviation.  There is no significant dynamic change from EKGs 1-2 in the emergency department.      Results:    Labs Ordered and Resulted from Time of ED Arrival Up to the Time of Departure from the ED   D DIMER QUANTITATIVE - Abnormal; Notable for the following components:       Result Value    D Dimer 0.6 (*)     All other components within normal limits   CBC WITH PLATELETS DIFFERENTIAL   BASIC METABOLIC PANEL   TROPONIN I   SARS-COV-2 (COVID-19) VIRUS RT-PCR   PARTIAL THROMBOPLASTIN TIME   MEASURE WEIGHT   NOTIFY PHYSICIAN   NOTIFY PHYSICIAN         CT Chest Pulmonary Embolism w Contrast   Final Result   IMPRESSION:   1.  No evidence for pulmonary embolism.    2.  Moderate emphysema.   3.  Mild groundglass density and interstitial edema in the lower lungs. Please correlate clinically for infection versus CHF.       Chest XR,  PA & LAT   Final Result   IMPRESSION: AP and lateral views of the chest were obtained.   Cardiomediastinal silhouette is within normal limits. No suspicious   focal pulmonary opacities. No significant pleural effusion or   pneumothorax.       RADHA GRAJEDA MD      Echocardiogram Complete    (Results Pending)             My impression is chest pain with EKG changes concerning for acute coronary syndrome or unstable angina.  Interestingly her troponin is undetectable.  Mild elevation of D-dimer for which chest CT done showing no evidence of pulmonary embolism.  Jason discussed with cardiology and will do medical management.  She was given metoprolol here for tachycardia hypertension and then will be given asked well.        Diagnosis    ICD-10-CM    1. Chest pain  R07.9 Asymptomatic SARS-CoV-2 COVID-19 Virus (Coronavirus) by PCR     Partial thromboplastin time     Partial thromboplastin time     Potassium     Troponin I     Hemoglobin A1c     Hemoglobin A1c     Magnesium     Magnesium     Troponin I     Heparin Unfractionated Anti Xa Level     CANCELED: Partial thromboplastin time     CANCELED: Magnesium     CANCELED: Hemoglobin A1c   2. Unstable angina (H)  I20.0     Concern for           Rodriguez Cherry MD  [facility]       Rodriguez Cherry MD  03/25/21 0000

## 2021-03-24 NOTE — Clinical Note
The first balloon was inserted into the right coronary artery and middle right coronary artery.Max pressure = 12 mike. Total duration = 20 seconds.     Max pressure = 10 mike. Total duration = 20 seconds.    Balloon reinflated a second time: Max pressure = 10 mike. Total duration = 20 seconds.

## 2021-03-25 ENCOUNTER — APPOINTMENT (OUTPATIENT)
Dept: CARDIOLOGY | Facility: CLINIC | Age: 61
End: 2021-03-25
Attending: HOSPITALIST
Payer: COMMERCIAL

## 2021-03-25 LAB
ANION GAP SERPL CALCULATED.3IONS-SCNC: 5 MMOL/L (ref 3–14)
BUN SERPL-MCNC: 15 MG/DL (ref 7–30)
CALCIUM SERPL-MCNC: 8.7 MG/DL (ref 8.5–10.1)
CHLORIDE SERPL-SCNC: 107 MMOL/L (ref 94–109)
CHOLEST SERPL-MCNC: 273 MG/DL
CO2 SERPL-SCNC: 25 MMOL/L (ref 20–32)
CREAT SERPL-MCNC: 0.68 MG/DL (ref 0.52–1.04)
ERYTHROCYTE [DISTWIDTH] IN BLOOD BY AUTOMATED COUNT: 12.7 % (ref 10–15)
GFR SERPL CREATININE-BSD FRML MDRD: >90 ML/MIN/{1.73_M2}
GLUCOSE SERPL-MCNC: 88 MG/DL (ref 70–99)
HCT VFR BLD AUTO: 36.6 % (ref 35–47)
HDLC SERPL-MCNC: 63 MG/DL
HGB BLD-MCNC: 12.7 G/DL (ref 11.7–15.7)
INTERPRETATION ECG - MUSE: NORMAL
INTERPRETATION ECG - MUSE: NORMAL
LDLC SERPL CALC-MCNC: 189 MG/DL
MCH RBC QN AUTO: 32.1 PG (ref 26.5–33)
MCHC RBC AUTO-ENTMCNC: 34.7 G/DL (ref 31.5–36.5)
MCV RBC AUTO: 92 FL (ref 78–100)
NONHDLC SERPL-MCNC: 210 MG/DL
PLATELET # BLD AUTO: 334 10E9/L (ref 150–450)
POTASSIUM SERPL-SCNC: 3.9 MMOL/L (ref 3.4–5.3)
RBC # BLD AUTO: 3.96 10E12/L (ref 3.8–5.2)
SODIUM SERPL-SCNC: 137 MMOL/L (ref 133–144)
TRIGL SERPL-MCNC: 105 MG/DL
TROPONIN I SERPL-MCNC: <0.015 UG/L (ref 0–0.04)
UFH PPP CHRO-ACNC: 0.24 IU/ML
UFH PPP CHRO-ACNC: 0.61 IU/ML
UFH PPP CHRO-ACNC: <0.1 IU/ML
WBC # BLD AUTO: 9.8 10E9/L (ref 4–11)

## 2021-03-25 PROCEDURE — 99232 SBSQ HOSP IP/OBS MODERATE 35: CPT | Performed by: INTERNAL MEDICINE

## 2021-03-25 PROCEDURE — 85520 HEPARIN ASSAY: CPT | Performed by: HOSPITALIST

## 2021-03-25 PROCEDURE — 258N000003 HC RX IP 258 OP 636: Performed by: INTERNAL MEDICINE

## 2021-03-25 PROCEDURE — 93306 TTE W/DOPPLER COMPLETE: CPT | Mod: 26 | Performed by: INTERNAL MEDICINE

## 2021-03-25 PROCEDURE — 93306 TTE W/DOPPLER COMPLETE: CPT

## 2021-03-25 PROCEDURE — 250N000013 HC RX MED GY IP 250 OP 250 PS 637: Performed by: HOSPITALIST

## 2021-03-25 PROCEDURE — 84484 ASSAY OF TROPONIN QUANT: CPT | Performed by: HOSPITALIST

## 2021-03-25 PROCEDURE — 85027 COMPLETE CBC AUTOMATED: CPT | Performed by: HOSPITALIST

## 2021-03-25 PROCEDURE — 120N000001 HC R&B MED SURG/OB

## 2021-03-25 PROCEDURE — 250N000011 HC RX IP 250 OP 636: Performed by: PHYSICIAN ASSISTANT

## 2021-03-25 PROCEDURE — 250N000013 HC RX MED GY IP 250 OP 250 PS 637: Performed by: INTERNAL MEDICINE

## 2021-03-25 PROCEDURE — 36415 COLL VENOUS BLD VENIPUNCTURE: CPT | Performed by: HOSPITALIST

## 2021-03-25 PROCEDURE — 80061 LIPID PANEL: CPT | Performed by: HOSPITALIST

## 2021-03-25 PROCEDURE — 80048 BASIC METABOLIC PNL TOTAL CA: CPT | Performed by: HOSPITALIST

## 2021-03-25 PROCEDURE — 99222 1ST HOSP IP/OBS MODERATE 55: CPT | Mod: 25 | Performed by: INTERNAL MEDICINE

## 2021-03-25 RX ORDER — LORAZEPAM 2 MG/ML
0.5 INJECTION INTRAMUSCULAR
Status: DISCONTINUED | OUTPATIENT
Start: 2021-03-25 | End: 2021-03-26 | Stop reason: HOSPADM

## 2021-03-25 RX ORDER — METOPROLOL TARTRATE 25 MG/1
25 TABLET, FILM COATED ORAL 2 TIMES DAILY
Status: DISCONTINUED | OUTPATIENT
Start: 2021-03-25 | End: 2021-03-27 | Stop reason: HOSPADM

## 2021-03-25 RX ORDER — SODIUM CHLORIDE 9 MG/ML
INJECTION, SOLUTION INTRAVENOUS CONTINUOUS
Status: DISCONTINUED | OUTPATIENT
Start: 2021-03-25 | End: 2021-03-26 | Stop reason: HOSPADM

## 2021-03-25 RX ORDER — LIDOCAINE 40 MG/G
CREAM TOPICAL
Status: DISCONTINUED | OUTPATIENT
Start: 2021-03-25 | End: 2021-03-26 | Stop reason: HOSPADM

## 2021-03-25 RX ORDER — LISINOPRIL 5 MG/1
5 TABLET ORAL DAILY
Status: DISCONTINUED | OUTPATIENT
Start: 2021-03-25 | End: 2021-03-27

## 2021-03-25 RX ORDER — POTASSIUM CHLORIDE 1500 MG/1
20 TABLET, EXTENDED RELEASE ORAL
Status: COMPLETED | OUTPATIENT
Start: 2021-03-25 | End: 2021-03-25

## 2021-03-25 RX ORDER — LORAZEPAM 0.5 MG/1
0.5 TABLET ORAL
Status: DISCONTINUED | OUTPATIENT
Start: 2021-03-25 | End: 2021-03-26 | Stop reason: HOSPADM

## 2021-03-25 RX ADMIN — ASPIRIN 81 MG: 81 TABLET ORAL at 08:59

## 2021-03-25 RX ADMIN — ASPIRIN 325 MG: 325 TABLET, COATED ORAL at 12:59

## 2021-03-25 RX ADMIN — SODIUM CHLORIDE: 9 INJECTION, SOLUTION INTRAVENOUS at 12:58

## 2021-03-25 RX ADMIN — ACETAMINOPHEN 650 MG: 325 TABLET, FILM COATED ORAL at 14:59

## 2021-03-25 RX ADMIN — LISINOPRIL 5 MG: 5 TABLET ORAL at 12:59

## 2021-03-25 RX ADMIN — POTASSIUM CHLORIDE 20 MEQ: 1500 TABLET, EXTENDED RELEASE ORAL at 12:59

## 2021-03-25 RX ADMIN — ATORVASTATIN CALCIUM 40 MG: 40 TABLET, FILM COATED ORAL at 20:57

## 2021-03-25 RX ADMIN — METOPROLOL TARTRATE 25 MG: 25 TABLET, FILM COATED ORAL at 12:59

## 2021-03-25 RX ADMIN — AMLODIPINE BESYLATE 10 MG: 10 TABLET ORAL at 08:59

## 2021-03-25 RX ADMIN — HEPARIN SODIUM 700 UNITS/HR: 10000 INJECTION, SOLUTION INTRAVENOUS at 06:06

## 2021-03-25 ASSESSMENT — ACTIVITIES OF DAILY LIVING (ADL)
ADLS_ACUITY_SCORE: 11

## 2021-03-25 ASSESSMENT — MIFFLIN-ST. JEOR
SCORE: 962.78
SCORE: 962.32

## 2021-03-25 NOTE — PROGRESS NOTES
Pt admitted to room 342.  VSS, B/P improving.  RA sats mid 90's.  Denies CP.  Amb to bed with SBA.

## 2021-03-25 NOTE — PRE-PROCEDURE
GENERAL PRE-PROCEDURE:   Procedure:  Coronary angiogram, left heart catheterization, left ventriculogram and possible intervention  Date/Time:  3/25/2021 11:56 AM    Written consent obtained?: Yes    Risks and benefits: Risks, benefits and alternatives were discussed    Consent given by:  Patient  Patient states understanding of procedure being performed: Yes    Patient's understanding of procedure matches consent: Yes    Procedure consent matches procedure scheduled: Yes    Expected level of sedation:  Moderate  Appropriately NPO:  Yes  ASA Class:  Class 3- Severe systemic disease, definite functional limitations  Mallampati  :  Grade 1- soft palate, uvula, tonsillar pillars, and posterior pharyngeal wall visible  Lungs:  Lungs clear with good breath sounds bilaterally  Heart:  Normal heart sounds and rate  History & Physical reviewed:  History and physical reviewed and no updates needed  Statement of review:  I have reviewed the lab findings, diagnostic data, medications, and the plan for sedation

## 2021-03-25 NOTE — CONSULTS
Cardiology consultation dictated    Assessment : 2 week history of recurrent chest pressure, new inferolateral ST T wave changes, small qs in 2,3 F in 61 year old woman with hypertension, smoking history. TTE shows normal LV systolic performance , no significant valvular disease and possible inferolateral regional wall motion abnormality.    Recommendations   1. Agree with heparin asa statin  BB I would substitute ACE or ARB for amlodipine  2. Immediate cessation of tobacco use  3. CAG possible PCI     I have examined the patient, reviewed the history, medications and pre procedural tests. I have explained to the patient the risks of death, MI, stroke, hematoma, possible urgent bypass surgery for failed PCI, use of stents, thienopyridine agents, possible peripheral vascular complications, arrhythmia, the use of FFR in clinical decision-making and alternative of medical therapy alone in regards to left heart catheterization, left ventriculography, coronary angiography, and possible percutaneous coronary intervention. The patient voiced understanding and wishes to proceed. The patient has  good right\left  radial pulses, normal ulnar pulses and  normal Donis's signs, however wrist and radial arteries may be small. She understands if radial approach not possible and femoral approach might be used. .

## 2021-03-25 NOTE — PLAN OF CARE
A&O. Complains of a headache. PRN tylenol given. Heparin gtt infusing. VSS. Independent with ambulation. Plan for angio today. Tele: SR with inverted t's

## 2021-03-25 NOTE — PROGRESS NOTES
Due to emergency complex procedures in cath lab, we will need to delay 's case until tomorrow 3/26. I have explained the reason for the delay to the patient. Will reschedule for tomorrow. Karena

## 2021-03-25 NOTE — UTILIZATION REVIEW
Admission Status; Secondary Review Determination     Under the authority of the Utilization Management Committee, the utilization review process indicated a secondary review on the above patient. The review outcome is based on review of the medical records, discussions with staff, and applying clinical experience noted on the date of the review.     (X) Inpatient Status Appropriate - This patient's medical care is consistent with medical management for inpatient care and reasonable inpatient medical practice.     RATIONALE FOR DETERMINATION   61-year-old woman with a history of hypertension, dyslipidemia and smoking history, presents with a 2-week history of recurrent substernal chest pressure and new EKG changes.  Patient diagnosed with unstable angina, seen by cardiologist who agrees, she is heparinized on acute coronary syndrome protocol  Unstable Angina is considered to be present in patients with ischemic symptoms suggestive of an ACS and no elevation in troponins, with or without ECG changes indicative of ischemia. Unstable angina is a medical emergency requiring the simultaneous application of multiple therapies. Hospitalization is recommended for all patients presenting with unstable angina.   [Geovany J, Cale C, Simba E, et al. ACC/AHA 2007 guidelines for the management of patients with unstable angina/non-ST-elevation myocardial infarction: a report of the American College of Cardiology/American Heart Association Task Force on Practice Guidelines (Writing Committee to revise the 2002 Guidelines for the Management of Patients with Unstable Angina/Non-ST-Elevation Myocardial Infarction): developed in collaboration with the American College of Emergency Physicians, American College or Physicians, Society for Academic Emergency Medicine, Society for Cardiovascular Angiography and Interventions, and Society of Thoracic Surgeons. J Am Mynor Cardiol 2007; 50:e1  www.acc.org/qualityandscience/clinical/statements.htm (Accessed on September 18, 2007).]   The expected length of stay at the time of admission was more than 2 midnights because of the severity of illness, intensity of service provided, and risk for adverse outcome. Inpatient admission is recommended based on the Medicare guidelines.     The information on this document is developed by the utilization review team in order for the business office to ensure compliance. This only denotes the appropriateness of proper admission status and does not reflect the quality of care rendered.   The definitions of Inpatient Status and Observation Status used in making the determination above are those provided in the CMS Coverage Manual, Chapter 1 and Chapter 6, section 70.4.   Sincerely,   MARCIA ALTAMIRANO MD   System Medical Director   Utilization Management   [facility].

## 2021-03-25 NOTE — CONSULTS
Consult Date:  03/25/2021      CARDIOLOGY CONSULTATION      REFERRING PHYSICIAN:  Jani Ramos MD      PRIMARY CARE PROVIDER:  Carlos Alberto Randle MD      HISTORY OF PRESENT ILLNESS:  Ramila Dietrich, a 61-year-old woman with hypertension and cigarette smoking history, was evaluated in consultation at the request of Dr. Ramos for recurrent chest pressure and new EKG changes.      For the last 2 weeks, the patient reports recurrent episodes of left-sided chest pressure that radiates to her left arm and shoulder.  This discomfort occurs in a sporadic fashion without a clear relation to activity.  She recalls a very prolonged episode of discomfort lasting several hours 2 weeks ago and then has had sporadic episodes since then, most recently yesterday.  She has been recently placed on amlodipine for blood pressure treatment.  She went to see her doctor for followup yesterday and reported she had chest discomfort.  An EKG was performed showing new changes.  The patient was sent to the [facility] Emergency Room.  CT scan showed moderate emphysema, possible mild interstitial edema in the lower lungs.  There was no pulmonary embolus.  There was no aortic dissection.  The patient has been placed on heparin.  She has been free of recurrent chest discomfort and subsequent troponin levels have remained in the normal range.  Cardiology consultation was requested.      CARDIAC RISK FACTORS:   1.  Cigarette smoking history, at least 1 pack per day for 40 years.   2.  Hypertension.        Denies known dyslipidemia, family history of premature coronary disease, previous MI or diabetes mellitus.      The patient's family history is positive for her mother having had MI at age 74, her father had cancer.      ALLERGIES:  ERYTHROMYCIN.  SHE APPARENTLY HAD JITTERY FEELINGS ON AMINOPHYLLINE.      HABITS:  The patient does use cigarettes, but does not use illicit drugs or abuse alcohol.  She smokes about a pack a day for at least 40  years.      PAST SURGICAL HISTORY:   1.  Status post appendectomy.   2.  History of 5 C-sections.   3.  History of right shoulder surgery.   4.  Tonsillectomy.      SOCIAL HISTORY:  The patient is single, but has a long-term domestic partner, who is with her at the bedside.  She has 5 children and 5 grandchildren.  She usually works at [employer], but has not been able to work this year because of the COVID pandemic.      PHYSICAL EXAMINATION:   GENERAL:  Exam today demonstrates a very pleasant, cooperative, thin 61-year-old woman.   VITAL SIGNS:  Her blood pressure was 119/72, heart rate 74.   LUNGS:  Clear to percussion and auscultation with rare rhonchi.   CARDIOVASCULAR:  Shows distant S1 and S2.  There is no S3.  There is no murmur, rub or click.  Her pulses are symmetrical in the carotid, radial, brachial, femoral, popliteal, dorsalis pedis and posterior tibials.   ABDOMEN:  Bowel sounds are present in all 4 quadrants.  Liver percusses to about 6-7 cm.  The spleen is not palpable.  The aorta is not tender or enlarged.   EXTREMITIES:  There is no swelling, cyanosis or clubbing.   NEUROLOGIC:  Cranial nerves II-XII are intact.  Strength equal and symmetrical.  She displays normal insight and judgment.      REVIEW OF SYSTEMS:  A 12-point review of systems was performed.  Outside the issues mentioned in the HPI, there are no other pertinent complaints.  The patient does report a great deal of emotional stress.      LABORATORY DATA:  Potassium is 4.9.  Her creatinine is 0.55.        Her ECG shows a sinus rhythm with small Q-waves in II, III, aVF and T-wave inversion in II, III, aVF, and V4 through V6.  These EKG changes are definitely new since her last ECG in early January 2021.  A chest CT showed emphysema without aortic dissection or pulmonary embolus.  I have personally reviewed a bedside echocardiogram.  The ejection fraction is normal.  There is no significant valvular stenosis or insufficiency.  I cannot  entirely exclude a subtle inferolateral regional wall motion abnormality.       Troponin enzymes are negative x3.  Creatinine 0.68.  Her LDL cholesterol is 189, with a total cholesterol of 273.  Hemoglobin is 12.7, white blood cell count is 9.8, platelet count is 334,000.      ASSESSMENT:  This 61-year-old woman with a history of hypertension, dyslipidemia and smoking history, presents with a 2-week history of recurrent substernal chest pressure and new EKG changes.  Her troponins are negative and her ejection fraction is well preserved, but I am concerned that these symptoms are suggestive of unstable angina.  In addition to her current medical therapy, I would recommend diagnostic coronary angiography and possible revascularization. I would change  from amlodipine to either an Ace inhibitor or an ARB agent.  I would continue beta blockers.  I agree with statin therapy, aspirin and heparin.      RECOMMENDATIONS:   1.  Discontinue amlodipine.     2.  Metoprolol 25 b.i.d.   3.  Begin lisinopril 10 mg daily.   4.  Agree with atorvastatin at current dose.   5.  Agree with aspirin.   6.  Agree with heparin.   7.  Diagnostic coronary angiography, followed by possible revascularization.  Risks and benefits have been reviewed.   8.  Immediate cessation of tobacco use.   9.  Mediterranean-style diet.      We greatly appreciate the opportunity to see your patient, Neil Dietrich.         ADIA MANE MD             D: 2021   T: 2021   MT: FLORI      Name:     NEIL DIETRICH   MRN:      [id number]        Account:       [id number]   :      1960           Consult Date:  2021      Document: [id number]

## 2021-03-25 NOTE — PLAN OF CARE
VSS, on RA. Denies SOB, CP. Tele SR w/ inverted T. LS clear with crackles in lower lobes. Heparin gtt @700 units/hr. Troponin negative, see results. Up independently in room. Tylenol given for mild headache. NPO since midnight for possible cardiology intervention.

## 2021-03-25 NOTE — PROGRESS NOTES
"[facility] RCAT     Date: 03/24/21  Admission Dx: Unstable angina  Pulmonary History: Asthma hx, current smoker of 1 ppd  Home Nebulizer/MDI Use: Albuterol MDI 2p Q6 prn (per pt)  Home Oxygen: None  Acuity Level (RCAT flow sheet): 4  Aerosol Therapy initiated: Albuterol MDI 2p Q6 prn      Pulmonary Hygiene initiated: Coughing techniques      Volume Expansion initiated: Incentive Spirometry      Current Oxygen Requirements: Room air  Current SpO2: 96%    Re-evaluation date: 03/27/21    Patient Education: Discussed use and benefits of respiratory medications.       See \"RT Assessments\" flow sheet for patient assessment scoring and Acuity Level Details.             "

## 2021-03-26 LAB
CHOLEST SERPL-MCNC: 206 MG/DL
HDLC SERPL-MCNC: 55 MG/DL
KCT BLD-ACNC: 302 SEC (ref 75–150)
KCT BLD-ACNC: 302 SEC (ref 75–150)
KCT BLD-ACNC: 318 SEC (ref 75–150)
KCT BLD-ACNC: 355 SEC (ref 75–150)
LDLC SERPL CALC-MCNC: 134 MG/DL
MAGNESIUM SERPL-MCNC: 2.4 MG/DL (ref 1.6–2.3)
NONHDLC SERPL-MCNC: 151 MG/DL
POTASSIUM SERPL-SCNC: 4.4 MMOL/L (ref 3.4–5.3)
TRIGL SERPL-MCNC: 83 MG/DL
UFH PPP CHRO-ACNC: 0.19 IU/ML
UFH PPP CHRO-ACNC: 0.82 IU/ML

## 2021-03-26 PROCEDURE — 250N000009 HC RX 250: Performed by: INTERNAL MEDICINE

## 2021-03-26 PROCEDURE — B2111ZZ FLUOROSCOPY OF MULTIPLE CORONARY ARTERIES USING LOW OSMOLAR CONTRAST: ICD-10-PCS | Performed by: INTERNAL MEDICINE

## 2021-03-26 PROCEDURE — 4A033BC MEASUREMENT OF ARTERIAL PRESSURE, CORONARY, PERCUTANEOUS APPROACH: ICD-10-PCS | Performed by: INTERNAL MEDICINE

## 2021-03-26 PROCEDURE — 93454 CORONARY ARTERY ANGIO S&I: CPT | Performed by: INTERNAL MEDICINE

## 2021-03-26 PROCEDURE — 120N000001 HC R&B MED SURG/OB

## 2021-03-26 PROCEDURE — 250N000013 HC RX MED GY IP 250 OP 250 PS 637: Performed by: INTERNAL MEDICINE

## 2021-03-26 PROCEDURE — 85347 COAGULATION TIME ACTIVATED: CPT

## 2021-03-26 PROCEDURE — 99152 MOD SED SAME PHYS/QHP 5/>YRS: CPT | Performed by: INTERNAL MEDICINE

## 2021-03-26 PROCEDURE — 027035Z DILATION OF CORONARY ARTERY, ONE ARTERY WITH TWO DRUG-ELUTING INTRALUMINAL DEVICES, PERCUTANEOUS APPROACH: ICD-10-PCS | Performed by: INTERNAL MEDICINE

## 2021-03-26 PROCEDURE — 83735 ASSAY OF MAGNESIUM: CPT | Performed by: INTERNAL MEDICINE

## 2021-03-26 PROCEDURE — 272N000001 HC OR GENERAL SUPPLY STERILE: Performed by: INTERNAL MEDICINE

## 2021-03-26 PROCEDURE — C1769 GUIDE WIRE: HCPCS | Performed by: INTERNAL MEDICINE

## 2021-03-26 PROCEDURE — 99232 SBSQ HOSP IP/OBS MODERATE 35: CPT | Mod: 25 | Performed by: INTERNAL MEDICINE

## 2021-03-26 PROCEDURE — C1887 CATHETER, GUIDING: HCPCS | Performed by: INTERNAL MEDICINE

## 2021-03-26 PROCEDURE — 80061 LIPID PANEL: CPT | Performed by: INTERNAL MEDICINE

## 2021-03-26 PROCEDURE — 99153 MOD SED SAME PHYS/QHP EA: CPT | Performed by: INTERNAL MEDICINE

## 2021-03-26 PROCEDURE — 250N000011 HC RX IP 250 OP 636: Performed by: INTERNAL MEDICINE

## 2021-03-26 PROCEDURE — C1725 CATH, TRANSLUMIN NON-LASER: HCPCS | Performed by: INTERNAL MEDICINE

## 2021-03-26 PROCEDURE — 250N000011 HC RX IP 250 OP 636: Performed by: PHYSICIAN ASSISTANT

## 2021-03-26 PROCEDURE — C1894 INTRO/SHEATH, NON-LASER: HCPCS | Performed by: INTERNAL MEDICINE

## 2021-03-26 PROCEDURE — 84132 ASSAY OF SERUM POTASSIUM: CPT | Performed by: INTERNAL MEDICINE

## 2021-03-26 PROCEDURE — C1874 STENT, COATED/COV W/DEL SYS: HCPCS | Performed by: INTERNAL MEDICINE

## 2021-03-26 PROCEDURE — 36415 COLL VENOUS BLD VENIPUNCTURE: CPT | Performed by: INTERNAL MEDICINE

## 2021-03-26 PROCEDURE — C9600 PERC DRUG-EL COR STENT SING: HCPCS | Performed by: INTERNAL MEDICINE

## 2021-03-26 PROCEDURE — 99233 SBSQ HOSP IP/OBS HIGH 50: CPT | Performed by: INTERNAL MEDICINE

## 2021-03-26 PROCEDURE — 36415 COLL VENOUS BLD VENIPUNCTURE: CPT | Performed by: HOSPITALIST

## 2021-03-26 PROCEDURE — 93571 IV DOP VEL&/PRESS C FLO 1ST: CPT | Mod: 52 | Performed by: INTERNAL MEDICINE

## 2021-03-26 PROCEDURE — 85520 HEPARIN ASSAY: CPT | Performed by: HOSPITALIST

## 2021-03-26 PROCEDURE — 250N000013 HC RX MED GY IP 250 OP 250 PS 637: Performed by: HOSPITALIST

## 2021-03-26 DEVICE — IMPLANTABLE DEVICE: Type: IMPLANTABLE DEVICE | Status: FUNCTIONAL

## 2021-03-26 DEVICE — STENT RESOLUTE ONYX DE 2.7FR 2.50X15MM RONYX DE25015UX: Type: IMPLANTABLE DEVICE | Status: FUNCTIONAL

## 2021-03-26 RX ORDER — VERAPAMIL HYDROCHLORIDE 2.5 MG/ML
INJECTION, SOLUTION INTRAVENOUS
Status: DISCONTINUED
Start: 2021-03-26 | End: 2021-03-26 | Stop reason: HOSPADM

## 2021-03-26 RX ORDER — ASPIRIN 81 MG/1
81 TABLET, CHEWABLE ORAL ONCE
Status: DISCONTINUED | OUTPATIENT
Start: 2021-03-26 | End: 2021-03-26

## 2021-03-26 RX ORDER — NALOXONE HYDROCHLORIDE 0.4 MG/ML
0.2 INJECTION, SOLUTION INTRAMUSCULAR; INTRAVENOUS; SUBCUTANEOUS
Status: DISCONTINUED | OUTPATIENT
Start: 2021-03-26 | End: 2021-03-27 | Stop reason: HOSPADM

## 2021-03-26 RX ORDER — NALOXONE HYDROCHLORIDE 0.4 MG/ML
0.4 INJECTION, SOLUTION INTRAMUSCULAR; INTRAVENOUS; SUBCUTANEOUS
Status: DISCONTINUED | OUTPATIENT
Start: 2021-03-26 | End: 2021-03-27 | Stop reason: HOSPADM

## 2021-03-26 RX ORDER — OXYCODONE HYDROCHLORIDE 5 MG/1
5 TABLET ORAL EVERY 4 HOURS PRN
Status: DISCONTINUED | OUTPATIENT
Start: 2021-03-26 | End: 2021-03-27 | Stop reason: HOSPADM

## 2021-03-26 RX ORDER — HEPARIN SODIUM 10000 [USP'U]/100ML
100-1000 INJECTION, SOLUTION INTRAVENOUS CONTINUOUS PRN
Status: DISCONTINUED | OUTPATIENT
Start: 2021-03-26 | End: 2021-03-26 | Stop reason: HOSPADM

## 2021-03-26 RX ORDER — ONDANSETRON 2 MG/ML
4 INJECTION INTRAMUSCULAR; INTRAVENOUS EVERY 6 HOURS PRN
Status: DISCONTINUED | OUTPATIENT
Start: 2021-03-26 | End: 2021-03-27 | Stop reason: HOSPADM

## 2021-03-26 RX ORDER — FLUMAZENIL 0.1 MG/ML
0.2 INJECTION, SOLUTION INTRAVENOUS
Status: ACTIVE | OUTPATIENT
Start: 2021-03-26 | End: 2021-03-26

## 2021-03-26 RX ORDER — ACETAMINOPHEN 325 MG/1
650 TABLET ORAL EVERY 4 HOURS PRN
Status: DISCONTINUED | OUTPATIENT
Start: 2021-03-26 | End: 2021-03-27 | Stop reason: HOSPADM

## 2021-03-26 RX ORDER — ATROPINE SULFATE 0.1 MG/ML
0.5 INJECTION INTRAVENOUS
Status: ACTIVE | OUTPATIENT
Start: 2021-03-26 | End: 2021-03-26

## 2021-03-26 RX ORDER — ASPIRIN 81 MG/1
81 TABLET ORAL DAILY
Status: DISCONTINUED | OUTPATIENT
Start: 2021-03-27 | End: 2021-03-27 | Stop reason: HOSPADM

## 2021-03-26 RX ORDER — FENTANYL CITRATE 50 UG/ML
25-50 INJECTION, SOLUTION INTRAMUSCULAR; INTRAVENOUS
Status: ACTIVE | OUTPATIENT
Start: 2021-03-26 | End: 2021-03-26

## 2021-03-26 RX ORDER — DOPAMINE HYDROCHLORIDE 160 MG/100ML
2-20 INJECTION, SOLUTION INTRAVENOUS CONTINUOUS PRN
Status: DISCONTINUED | OUTPATIENT
Start: 2021-03-26 | End: 2021-03-26 | Stop reason: HOSPADM

## 2021-03-26 RX ORDER — VERAPAMIL HYDROCHLORIDE 2.5 MG/ML
INJECTION, SOLUTION INTRAVENOUS
Status: DISCONTINUED | OUTPATIENT
Start: 2021-03-26 | End: 2021-03-26 | Stop reason: HOSPADM

## 2021-03-26 RX ORDER — NITROGLYCERIN 0.4 MG/1
0.4 TABLET SUBLINGUAL EVERY 5 MIN PRN
Status: DISCONTINUED | OUTPATIENT
Start: 2021-03-26 | End: 2021-03-27 | Stop reason: HOSPADM

## 2021-03-26 RX ORDER — LIDOCAINE HYDROCHLORIDE 10 MG/ML
INJECTION, SOLUTION EPIDURAL; INFILTRATION; INTRACAUDAL; PERINEURAL
Status: DISCONTINUED
Start: 2021-03-26 | End: 2021-03-26 | Stop reason: HOSPADM

## 2021-03-26 RX ORDER — SODIUM CHLORIDE 9 MG/ML
INJECTION, SOLUTION INTRAVENOUS CONTINUOUS
Status: ACTIVE | OUTPATIENT
Start: 2021-03-26 | End: 2021-03-26

## 2021-03-26 RX ORDER — NITROGLYCERIN 5 MG/ML
VIAL (ML) INTRAVENOUS
Status: DISCONTINUED
Start: 2021-03-26 | End: 2021-03-26 | Stop reason: HOSPADM

## 2021-03-26 RX ORDER — FENTANYL CITRATE-0.9 % NACL/PF 10 MCG/ML
PLASTIC BAG, INJECTION (ML) INTRAVENOUS
Status: DISCONTINUED
Start: 2021-03-26 | End: 2021-03-26 | Stop reason: WASHOUT

## 2021-03-26 RX ORDER — ONDANSETRON 4 MG/1
4 TABLET, ORALLY DISINTEGRATING ORAL EVERY 6 HOURS PRN
Status: DISCONTINUED | OUTPATIENT
Start: 2021-03-26 | End: 2021-03-27 | Stop reason: HOSPADM

## 2021-03-26 RX ORDER — FENTANYL CITRATE 50 UG/ML
INJECTION, SOLUTION INTRAMUSCULAR; INTRAVENOUS
Status: DISCONTINUED
Start: 2021-03-26 | End: 2021-03-26 | Stop reason: HOSPADM

## 2021-03-26 RX ORDER — FENTANYL CITRATE 50 UG/ML
INJECTION, SOLUTION INTRAMUSCULAR; INTRAVENOUS
Status: DISCONTINUED | OUTPATIENT
Start: 2021-03-26 | End: 2021-03-26 | Stop reason: HOSPADM

## 2021-03-26 RX ORDER — HEPARIN SODIUM 1000 [USP'U]/ML
INJECTION, SOLUTION INTRAVENOUS; SUBCUTANEOUS
Status: DISCONTINUED
Start: 2021-03-26 | End: 2021-03-26 | Stop reason: HOSPADM

## 2021-03-26 RX ORDER — EPTIFIBATIDE 2 MG/ML
2 INJECTION, SOLUTION INTRAVENOUS CONTINUOUS PRN
Status: DISCONTINUED | OUTPATIENT
Start: 2021-03-26 | End: 2021-03-26 | Stop reason: HOSPADM

## 2021-03-26 RX ORDER — DOBUTAMINE HYDROCHLORIDE 200 MG/100ML
2-20 INJECTION INTRAVENOUS CONTINUOUS PRN
Status: DISCONTINUED | OUTPATIENT
Start: 2021-03-26 | End: 2021-03-26 | Stop reason: HOSPADM

## 2021-03-26 RX ORDER — METOPROLOL TARTRATE 1 MG/ML
5-10 INJECTION, SOLUTION INTRAVENOUS
Status: DISCONTINUED | OUTPATIENT
Start: 2021-03-26 | End: 2021-03-27 | Stop reason: HOSPADM

## 2021-03-26 RX ORDER — HEPARIN SODIUM 1000 [USP'U]/ML
INJECTION, SOLUTION INTRAVENOUS; SUBCUTANEOUS
Status: DISCONTINUED | OUTPATIENT
Start: 2021-03-26 | End: 2021-03-26 | Stop reason: HOSPADM

## 2021-03-26 RX ORDER — OXYCODONE HYDROCHLORIDE 5 MG/1
10 TABLET ORAL EVERY 4 HOURS PRN
Status: DISCONTINUED | OUTPATIENT
Start: 2021-03-26 | End: 2021-03-27 | Stop reason: HOSPADM

## 2021-03-26 RX ORDER — ARGATROBAN 1 MG/ML
150 INJECTION, SOLUTION INTRAVENOUS
Status: DISCONTINUED | OUTPATIENT
Start: 2021-03-26 | End: 2021-03-26 | Stop reason: HOSPADM

## 2021-03-26 RX ORDER — ARGATROBAN 1 MG/ML
350 INJECTION, SOLUTION INTRAVENOUS
Status: DISCONTINUED | OUTPATIENT
Start: 2021-03-26 | End: 2021-03-26 | Stop reason: HOSPADM

## 2021-03-26 RX ORDER — EPTIFIBATIDE 2 MG/ML
180 INJECTION, SOLUTION INTRAVENOUS EVERY 10 MIN PRN
Status: DISCONTINUED | OUTPATIENT
Start: 2021-03-26 | End: 2021-03-26 | Stop reason: HOSPADM

## 2021-03-26 RX ORDER — IOPAMIDOL 755 MG/ML
INJECTION, SOLUTION INTRAVASCULAR
Status: DISCONTINUED | OUTPATIENT
Start: 2021-03-26 | End: 2021-03-26 | Stop reason: HOSPADM

## 2021-03-26 RX ORDER — HYDRALAZINE HYDROCHLORIDE 20 MG/ML
10 INJECTION INTRAMUSCULAR; INTRAVENOUS EVERY 4 HOURS PRN
Status: DISCONTINUED | OUTPATIENT
Start: 2021-03-26 | End: 2021-03-27 | Stop reason: HOSPADM

## 2021-03-26 RX ORDER — NITROGLYCERIN 5 MG/ML
VIAL (ML) INTRAVENOUS
Status: DISCONTINUED | OUTPATIENT
Start: 2021-03-26 | End: 2021-03-26 | Stop reason: HOSPADM

## 2021-03-26 RX ADMIN — METOPROLOL TARTRATE 25 MG: 25 TABLET, FILM COATED ORAL at 08:13

## 2021-03-26 RX ADMIN — HEPARIN SODIUM 500 UNITS/HR: 10000 INJECTION, SOLUTION INTRAVENOUS at 08:02

## 2021-03-26 RX ADMIN — ASPIRIN 325 MG: 325 TABLET, COATED ORAL at 08:14

## 2021-03-26 RX ADMIN — HEPARIN SODIUM 800 UNITS/HR: 10000 INJECTION, SOLUTION INTRAVENOUS at 01:55

## 2021-03-26 RX ADMIN — LISINOPRIL 5 MG: 5 TABLET ORAL at 08:13

## 2021-03-26 RX ADMIN — ATORVASTATIN CALCIUM 40 MG: 40 TABLET, FILM COATED ORAL at 21:41

## 2021-03-26 RX ADMIN — LORAZEPAM 0.5 MG: 0.5 TABLET ORAL at 08:13

## 2021-03-26 RX ADMIN — ACETAMINOPHEN 650 MG: 325 TABLET, FILM COATED ORAL at 00:00

## 2021-03-26 ASSESSMENT — ACTIVITIES OF DAILY LIVING (ADL)
ADLS_ACUITY_SCORE: 11

## 2021-03-26 ASSESSMENT — MIFFLIN-ST. JEOR: SCORE: 960.96

## 2021-03-26 NOTE — PROGRESS NOTES
Cardiology staff    The patient underwent successful PCI proximal and distal RCA . Moderate LAD disease with normal IFR. We anticipate discharge tomorrow. She understand we have advised cessation of tobacco use, statin asa and ideal blood pressure control with ACE/BB.  I expect she will be switched from ticagrelor to clopidogrel as outpatient.  Her access site looks fine.    Karena

## 2021-03-26 NOTE — PROGRESS NOTES
"[facility]    Medicine Progress Note - Hospitalist Service       Date of Admission:  3/24/2021  Assessment & Plan         Ramila Dietrich is a 61 year old female with a past medical history of asthma, HTN, tobacco use disorder, GERD who presents with chest pain.     HPI per admitting provider \"Ramila notes that she started to have some left-sided chest pain/pressure radiating to the left shoulder and down the arm about 2 weeks ago.  She was not doing anything active at the time.  Lasted a couple hours then went away.  She then has been noticing the same intermittent chest pain/pressure over the last week.  Associated with shortness of breath.  Patient initially saw her PCP obtained an EKG that showed new T wave inversions in inferolateral leads and she was sent to the ED for evaluation.  She is a current smoker and smokes 1 pack/day.  She does note that she has been under stress over the last several weeks due to social stressors involving her son.  Her mother did have a heart attack in her 70's.  She denies any alcohol use or illicit drug use.\"     #Chest pain, concern for unstable angina: Patient was initially tachycardic to 120 and hypertensive.  Labs notable for normal BMP.  CBC unremarkable.  Troponin negative.  D dimer was positive.  Chest x-ray without any acute CP process noted.  EKG obtained showed T wave inversions some inferior and anterolateral leads.  CT chest was negative for PE. Showing emphysema   -- Continue treatment for unstable angina.  Continue heparin drip.  Daily aspirin.  Start statin 40 mg daily.  NTG prn.  Check lipid profile and A1c   -- Telemetry, TTE wnl.  Trend troponins.    -- Cardiology consulted. Plan for cor angiogram today      #Tobacco use d/o: Smokes 1 pack/day.  Declines nicotine replacement at this time.  Advised cessation.     #HTN: Metoprolol 25 mg BID, Lisinopril 5 mg daily      #COPD/Asthma: No signs of exacerbation.  Significant emphysema on imaging  -- " Continue albuterol prn         Diet: NPO for Medical/Clinical Reasons Except for: Meds    DVT Prophylaxis: IV heparin.  Guerra Catheter: not present  Code Status: Full Code           Disposition Plan   Expected discharge: Tomorrow, recommended to prior living arrangement once cardiac w/u complete.  Entered: Marques Cuevas MD 03/26/2021, 3:28 PM       The patient's care was discussed with the Patient.    Marques Cuevas MD  Hospitalist Service  [facility]  Contact information available via [facility] Paging/Directory    ______________________________________________________________________    Interval History     No chest pain/SOB.    Data reviewed today: I reviewed all medications, new labs and imaging results over the last 24 hours. I personally reviewed no images or EKG's today.    Physical Exam   Vital Signs: Temp: 98  F (36.7  C) Temp src: Oral BP: (!) 145/74 Pulse: 72   Resp: 18 SpO2: 98 % O2 Device: Nasal cannula Oxygen Delivery: 3 LPM  Weight: 97 lbs 9.6 oz    Gen - AAO x 3 in NAD.  Lungs - CTA B.  Heart - RR,S1+S2 nml, no m/g/r.  Abd - soft, NT, ND, + BS.  Ext - no edema.    Data   Recent Labs   Lab 03/26/21  0554 03/25/21  0501 03/25/21  0149 03/24/21  2217 03/24/21  1832 03/24/21  1506   WBC  --  9.8  --   --   --  9.8   HGB  --  12.7  --   --   --  14.7   MCV  --  92  --   --   --  91   PLT  --  334  --   --   --  279   NA  --  137  --   --   --  134   POTASSIUM 4.4 3.9  --   --  3.9 4.9   CHLORIDE  --  107  --   --   --  106   CO2  --  25  --   --   --  23   BUN  --  15  --   --   --  11   CR  --  0.68  --   --   --  0.55   ANIONGAP  --  5  --   --   --  5   REJI  --  8.7  --   --   --  9.5   GLC  --  88  --   --   --  90   TROPI  --   --  <0.015 <0.015 <0.015 <0.015     No results found for this or any previous visit (from the past 24 hour(s)).  Medications     argatroban       cangrelor (KENGREAL) infusion ADULT       DOBUTamine       DOPamine       eptifibatide       HEParin        heparin Stopped (03/26/21 1405)     - MEDICATION INSTRUCTIONS -       nitroPRUsside       - MEDICATION INSTRUCTIONS -       - MEDICATION INSTRUCTIONS -       sodium chloride Stopped (03/25/21 7505)       aspirin  81 mg Oral Daily     atorvastatin  40 mg Oral QPM     cangrelor  30 mcg/kg Intravenous Once     fentaNYL (PF)         fentaNYL (PF)         lisinopril  5 mg Oral Daily     metoprolol tartrate  25 mg Oral BID     midazolam         midazolam         nitroGLYcerin in D5W         phenylephrine         sodium chloride (PF)  3 mL Intracatheter Q8H     ticagrelor

## 2021-03-26 NOTE — PLAN OF CARE
Denies CP, SOB. Pt independent in room, pt educated to call if feeling dizzy, weak, unsteady, Pt verbalized understanding. AxO x4. On Hep drip currently 800 units/hour. Plan to angio in AM. NPO at 0000. Continue to monitor and with plan of care.

## 2021-03-26 NOTE — PLAN OF CARE
A&O x 4. Up with SBA. IV SL. Tolerating low NA diet.   R radial site band on- see flowsheet for details    at bedside.   Possible discharge tomorrow

## 2021-03-26 NOTE — PROGRESS NOTES
"[facility]    Cardiology Progress Note    Date of Service (when I saw the patient): 03/26/2021     Assessment & Plan   Ramila Dietrich is a 61 year old female with a hx of hypertension and cigarette smoking who was admitted on 3/24/2021 with CP. ECG showed sinus rhythm with small Q-waves in II, III, aVF and T-wave inversion in II, III, aVF, and V4 through V6 which were new. TTE preserved LV function, no WMA, no valve dz.    1. CP/concern for unstable angina  -plan is for cardiac cath today  -Continue metoprolol 25mg BID, lisinopril 5mg daily, ASA and atorvastatin 40mg    2. HTN. Overall controlled.    3. Tobacco use. Cessation recommended.    4. Dyslipidemia elevated LDL C  On statin    Venus Alexandre PA-C      Interval History   Slight chest \"heaviness\" earlier this AM, but none now. No dyspnea.     Tele: SR    Physical Exam   Temp: 98  F (36.7  C) Temp src: Oral BP: (!) 145/74 Pulse: 72   Resp: 18 SpO2: 98 % O2 Device: None (Room air)    Vitals:    03/25/21 0500 03/25/21 0604 03/26/21 0645   Weight: 44.4 kg (97 lb 14.4 oz) 44.5 kg (98 lb) 44.3 kg (97 lb 9.6 oz)     Vital Signs with Ranges  Temp:  [96.5  F (35.8  C)-98  F (36.7  C)] 98  F (36.7  C)  Pulse:  [62-73] 72  Resp:  [18] 18  BP: ()/(51-74) 145/74  SpO2:  [97 %-99 %] 98 %  No intake/output data recorded.    Constitutional     alert and oriented, in no acute distress.     Skin     warm and dry to touch    Lungs  clear to auscultation     Cardiac  regular rhythm, no murmurs, no rubs    Abdomen     abdomen soft, bowel sounds normoactive, no hepatosplenomegaly    Extremities and Back     no clubbing, cyanosis. No edema observed.        Neurological     no gross motor deficits noted, affect appropriate, oriented to time, person and place.      Medications     heparin 500 Units/hr (03/26/21 0802)     - MEDICATION INSTRUCTIONS -       - MEDICATION INSTRUCTIONS -       sodium chloride Stopped (03/25/21 9778)       aspirin  81 mg " Oral Daily     atorvastatin  40 mg Oral QPM     heparin (porcine)         lidocaine (PF)         lisinopril  5 mg Oral Daily     metoprolol tartrate  25 mg Oral BID     nitroGLYcerin in D5W         sodium chloride (PF)  3 mL Intracatheter Q8H     verapamil           Data   Most Recent 3 CBC's:  Recent Labs   Lab Test 03/25/21  0501 03/24/21  1506 01/08/21  2316   WBC 9.8 9.8 15.0*   HGB 12.7 14.7 13.8   MCV 92 91 94    279 318     Most Recent 3 BMP's:  Recent Labs   Lab Test 03/26/21  0554 03/25/21  0501 03/24/21  1832 03/24/21  1506 01/08/21  2316   NA  --  137  --  134 136   POTASSIUM 4.4 3.9 3.9 4.9 4.8   CHLORIDE  --  107  --  106 108   CO2  --  25  --  23 27   BUN  --  15  --  11 12   CR  --  0.68  --  0.55 0.69   ANIONGAP  --  5  --  5 1*   REJI  --  8.7  --  9.5 9.1   GLC  --  88  --  90 84     Most Recent 3 Troponin's:  Recent Labs   Lab Test 03/25/21  0149 03/24/21  2217 03/24/21  1832   TROPI <0.015 <0.015 <0.015     Most Recent Cholesterol Panel:  Recent Labs   Lab Test 03/25/21  0501   CHOL 273*   *   HDL 63   TRIG 105     Most Recent TSH and T4:No lab results found.     Echo:  The visual ejection fraction is estimated at 55-60%.  No regional wall motion abnormalities noted.   No valvular heart disease.    Cardiology staff  I have personally examined the patient, reviewed her labs and medications. I have reviewed the results of the CAG and PCI from today. She has a moderate lesion in mid LAD with normal IFR. The RCA had 70% proximal and distal lesions successfully treated with AKASH. We agree with  discharge tomorrow. She will follow up in [facility] cardiology clinic. . Access site RTR looks good. Lungs clear heart tones regular.

## 2021-03-26 NOTE — PLAN OF CARE
VSS, on RA. Denies CP, SOB. Tele: SR w/ inverted T waves. LS clear. Heparin gtt @800 units/hr. Up independently in room. Plan for angio this morning, NPO since midnight.

## 2021-03-26 NOTE — PRE-PROCEDURE
GENERAL PRE-PROCEDURE:     ASA Class:  Class 3- Severe systemic disease, definite functional limitations  Mallampati  :  Grade 2- soft palate, base of uvula, tonsillar pillars, and portion of posterior pharyngeal wall visible

## 2021-03-27 ENCOUNTER — APPOINTMENT (OUTPATIENT)
Dept: OCCUPATIONAL THERAPY | Facility: CLINIC | Age: 61
End: 2021-03-27
Attending: INTERNAL MEDICINE
Payer: COMMERCIAL

## 2021-03-27 VITALS
DIASTOLIC BLOOD PRESSURE: 66 MMHG | OXYGEN SATURATION: 99 % | WEIGHT: 97.9 LBS | BODY MASS INDEX: 18.02 KG/M2 | HEART RATE: 89 BPM | SYSTOLIC BLOOD PRESSURE: 102 MMHG | HEIGHT: 62 IN | TEMPERATURE: 97 F | RESPIRATION RATE: 16 BRPM

## 2021-03-27 LAB
ANION GAP SERPL CALCULATED.3IONS-SCNC: 7 MMOL/L (ref 3–14)
BASOPHILS # BLD AUTO: 0.1 10E9/L (ref 0–0.2)
BASOPHILS NFR BLD AUTO: 0.8 %
BUN SERPL-MCNC: 14 MG/DL (ref 7–30)
CALCIUM SERPL-MCNC: 8.6 MG/DL (ref 8.5–10.1)
CHLORIDE SERPL-SCNC: 109 MMOL/L (ref 94–109)
CO2 SERPL-SCNC: 24 MMOL/L (ref 20–32)
CREAT SERPL-MCNC: 0.71 MG/DL (ref 0.52–1.04)
DIFFERENTIAL METHOD BLD: ABNORMAL
EOSINOPHIL # BLD AUTO: 0.3 10E9/L (ref 0–0.7)
EOSINOPHIL NFR BLD AUTO: 2.4 %
ERYTHROCYTE [DISTWIDTH] IN BLOOD BY AUTOMATED COUNT: 12.8 % (ref 10–15)
GFR SERPL CREATININE-BSD FRML MDRD: >90 ML/MIN/{1.73_M2}
GLUCOSE SERPL-MCNC: 83 MG/DL (ref 70–99)
HCT VFR BLD AUTO: 34.7 % (ref 35–47)
HGB BLD-MCNC: 11.7 G/DL (ref 11.7–15.7)
IMM GRANULOCYTES # BLD: 0.1 10E9/L (ref 0–0.4)
IMM GRANULOCYTES NFR BLD: 0.4 %
LYMPHOCYTES # BLD AUTO: 2.2 10E9/L (ref 0.8–5.3)
LYMPHOCYTES NFR BLD AUTO: 17.4 %
MAGNESIUM SERPL-MCNC: 2.2 MG/DL (ref 1.6–2.3)
MCH RBC QN AUTO: 31.2 PG (ref 26.5–33)
MCHC RBC AUTO-ENTMCNC: 33.7 G/DL (ref 31.5–36.5)
MCV RBC AUTO: 93 FL (ref 78–100)
MONOCYTES # BLD AUTO: 0.9 10E9/L (ref 0–1.3)
MONOCYTES NFR BLD AUTO: 7.4 %
NEUTROPHILS # BLD AUTO: 8.9 10E9/L (ref 1.6–8.3)
NEUTROPHILS NFR BLD AUTO: 71.6 %
NRBC # BLD AUTO: 0 10*3/UL
NRBC BLD AUTO-RTO: 0 /100
PLATELET # BLD AUTO: 240 10E9/L (ref 150–450)
POTASSIUM SERPL-SCNC: 3.7 MMOL/L (ref 3.4–5.3)
RBC # BLD AUTO: 3.75 10E12/L (ref 3.8–5.2)
SODIUM SERPL-SCNC: 140 MMOL/L (ref 133–144)
WBC # BLD AUTO: 12.5 10E9/L (ref 4–11)

## 2021-03-27 PROCEDURE — 36415 COLL VENOUS BLD VENIPUNCTURE: CPT | Performed by: INTERNAL MEDICINE

## 2021-03-27 PROCEDURE — 97535 SELF CARE MNGMENT TRAINING: CPT | Mod: GO

## 2021-03-27 PROCEDURE — 97165 OT EVAL LOW COMPLEX 30 MIN: CPT | Mod: GO

## 2021-03-27 PROCEDURE — 99232 SBSQ HOSP IP/OBS MODERATE 35: CPT | Performed by: INTERNAL MEDICINE

## 2021-03-27 PROCEDURE — 250N000013 HC RX MED GY IP 250 OP 250 PS 637: Performed by: INTERNAL MEDICINE

## 2021-03-27 PROCEDURE — 99239 HOSP IP/OBS DSCHRG MGMT >30: CPT | Performed by: INTERNAL MEDICINE

## 2021-03-27 PROCEDURE — 83735 ASSAY OF MAGNESIUM: CPT | Performed by: INTERNAL MEDICINE

## 2021-03-27 PROCEDURE — 85025 COMPLETE CBC W/AUTO DIFF WBC: CPT | Performed by: INTERNAL MEDICINE

## 2021-03-27 PROCEDURE — 97110 THERAPEUTIC EXERCISES: CPT | Mod: GO

## 2021-03-27 PROCEDURE — 80048 BASIC METABOLIC PNL TOTAL CA: CPT | Performed by: INTERNAL MEDICINE

## 2021-03-27 RX ORDER — METOPROLOL TARTRATE 25 MG/1
25 TABLET, FILM COATED ORAL 2 TIMES DAILY
Qty: 60 TABLET | Refills: 0 | Status: SHIPPED | OUTPATIENT
Start: 2021-03-27 | End: 2021-04-26

## 2021-03-27 RX ORDER — ATORVASTATIN CALCIUM 40 MG/1
40 TABLET, FILM COATED ORAL EVERY EVENING
Qty: 30 TABLET | Refills: 0 | Status: SHIPPED | OUTPATIENT
Start: 2021-03-27 | End: 2021-04-28

## 2021-03-27 RX ORDER — LISINOPRIL 5 MG/1
5 TABLET ORAL DAILY
Qty: 30 TABLET | Refills: 0 | Status: SHIPPED | OUTPATIENT
Start: 2021-03-27 | End: 2021-03-27

## 2021-03-27 RX ADMIN — ASPIRIN 81 MG: 81 TABLET ORAL at 08:52

## 2021-03-27 RX ADMIN — TICAGRELOR 90 MG: 90 TABLET ORAL at 00:44

## 2021-03-27 ASSESSMENT — MIFFLIN-ST. JEOR: SCORE: 962.32

## 2021-03-27 ASSESSMENT — ACTIVITIES OF DAILY LIVING (ADL)
ADLS_ACUITY_SCORE: 11
PREVIOUS_RESPONSIBILITIES: MEAL PREP;HOUSEKEEPING;LAUNDRY;SHOPPING;FINANCES;MEDICATION MANAGEMENT;DRIVING;WORK
ADLS_ACUITY_SCORE: 11
ADLS_ACUITY_SCORE: 11

## 2021-03-27 NOTE — DISCHARGE SUMMARY
AVS reviewed with pt and . All questions answered. Pt and  deny any further questions or concerns. PIV removed, no complications. Telemetry monitor removed. All belongings returned including four printed, signed prescriptions. Pt escorted off floor by [place] staff, home w/ .

## 2021-03-27 NOTE — PROGRESS NOTES
03/27/21 0854   Quick Adds   Type of Visit Initial Occupational Therapy Evaluation   Living Environment   People in home significant other   Current Living Arrangements condominium   Home Accessibility stairs to enter home;stairs within home   Transportation Anticipated car, drives self;family or friend will provide   Living Environment Comments Pt lives with significant other in condo, stairs to enter/within.    Self-Care   Usual Activity Tolerance good   Regular Exercise No   Equipment Currently Used at Home none   Disability/Function   Hearing Difficulty or Deaf no   Wear Glasses or Blind no   Concentrating, Remembering or Making Decisions Difficulty no   Difficulty Communicating no   Difficulty Eating/Swallowing no   Walking or Climbing Stairs Difficulty no   Dressing/Bathing Difficulty no   Toileting issues no   Doing Errands Independently Difficulty (such as shopping) no   Fall history within last six months no   Change in Functional Status Since Onset of Current Illness/Injury yes   General Information   Onset of Illness/Injury or Date of Surgery 03/24/21   Referring Physician Madi Dye MD   Patient/Family Therapy Goal Statement (OT) Pt's goal is to d/c home   Additional Occupational Profile Info/Pertinent History of Current Problem Per chart: Pt is a 61 year old female admitted with unstable angina, now s/p PCI w/ AKASH   Performance Patterns (Routines, Roles, Habits) Pt reports indep in all ADLs, IADLs and mobility tasks with no AD at baseline.    Existing Precautions/Restrictions cardiac   Pain Assessment   Patient Currently in Pain No   Transfers   Transfer Comments Independent transfers/mobility    Balance   Balance Comments No balance deficits identified    Activities of Daily Living   BADL Assessment/Intervention toileting   Toileting   [place] Level (Toileting) independent   Instrumental Activities of Daily Living (IADL)   Previous Responsibilities meal  prep;housekeeping;laundry;shopping;finances;medication management;driving;work   IADL Comments Has support of significant other for IADLs as needed    Clinical Impression   Criteria for Skilled Therapeutic Interventions Met (OT) yes;meets criteria;skilled treatment is necessary   OT Diagnosis Impaired IADls, tolerance to activity    OT Problem List-Impairments impacting ADL problems related to;activity tolerance impaired   ADL comments/analysis Pt near baseline with ADLs   Assessment of Occupational Performance 1-3 Performance Deficits   Identified Performance Deficits IADLs, activity tolerance   Planned Therapy Interventions (OT) ADL retraining;IADL retraining;strengthening;transfer training;home program guidelines;progressive activity/exercise;risk factor education   Clinical Decision Making Complexity (OT) low complexity   Therapy Frequency (OT) 1x eval and treat   Risk & Benefits of therapy have been explained evaluation/treatment results reviewed;care plan/treatment goals reviewed;risks/benefits reviewed;current/potential barriers reviewed;participants voiced agreement with care plan;participants included;patient;spouse/significant other   OT Discharge Planning    OT Discharge Recommendation (DC Rec) home with outpatient cardiac rehab   OT Rationale for DC Rec Recommend OP CR for ongoing CV strengthening through monitored exercise and education post PCI w/ AKASH   Total Evaluation Time (Minutes)   Total Evaluation Time (Minutes) 8

## 2021-03-27 NOTE — PLAN OF CARE
Occupational Therapy and Cardiac Rehab Discharge Summary    Reason for therapy discharge:    All goals and outcomes met, no further needs identified.    Progress towards therapy goal(s). See goals on Care Plan in [facility] electronic health record for goal details.  Goals met    Therapy recommendation(s):    Continued therapy is recommended.  Rationale/Recommendations:  OP CR for ongoing CV strengthening through monitored exercise and education post PCI w/ AKASH.

## 2021-03-27 NOTE — PLAN OF CARE
A/O x 4. VSS on RA except soft BP's , pt asymptomatic. Denied pain. Tele SR w/ invert T's, denied CP. LS clear/dim, denied SOB. PIV to left intact, SL . Right radial site intact, no hemo, CMS intact. Up SBA, steady gait, denies dizziness. Adequate for discharge today, home w/ spouse. Will continue POC.

## 2021-03-27 NOTE — DISCHARGE SUMMARY
"[facility]  Hospitalist Discharge Summary      Date of Admission:  3/24/2021  Date of Discharge:  3/27/2021  Discharging Provider: Marques Cuevas MD      Discharge Diagnoses         Follow-ups Needed After Discharge   Follow-up Appointments     Follow-up and recommended labs and tests       Follow up with primary care provider, [facility], within   7 days for hospital follow- up.  No follow up labs or test are needed.   Follow up with cardiology as scheduled and for future refills.             Unresulted Labs Ordered in the Past 30 Days of this Admission     No orders found from 2/22/2021 to 3/25/2021.          Discharge Disposition   Discharged to home  Condition at discharge: Stable      Hospital Course           Ramila Dietrich is a 61 year old female with a past medical history of asthma, HTN, tobacco use disorder, GERD who presents with chest pain.     HPI per admitting provider \"Ramila notes that she started to have some left-sided chest pain/pressure radiating to the left shoulder and down the arm about 2 weeks ago.  She was not doing anything active at the time.  Lasted a couple hours then went away.  She then has been noticing the same intermittent chest pain/pressure over the last week.  Associated with shortness of breath.  Patient initially saw her PCP obtained an EKG that showed new T wave inversions in inferolateral leads and she was sent to the ED for evaluation.  She is a current smoker and smokes 1 pack/day.  She does note that she has been under stress over the last several weeks due to social stressors involving her son.  Her mother did have a heart attack in her 70's.  She denies any alcohol use or illicit drug use.\"     #Chest pain, concern for unstable angina: Patient was initially tachycardic to 120 and hypertensive.  Labs notable for normal BMP.  CBC unremarkable.  Troponin negative.  D dimer was positive.  Chest x-ray without any acute CP process noted. "  EKG obtained showed T wave inversions some inferior and anterolateral leads.  CT chest was negative for PE. Showing emphysema   -- Continue treatment for unstable angina.  Continue heparin drip.  Daily aspirin.  Start statin 40 mg daily.  NTG prn.  Check lipid profile and A1c   -- Telemetry, TTE wnl.  Trend troponins.    -- Cardiology consulted. Plan for cor angiogram      #Tobacco use d/o: Smokes 1 pack/day.  Declines nicotine replacement at this time.  Advised cessation.     #HTN: Metoprolol 25 mg BID, Lisinopril 5 mg daily      #COPD/Asthma: No signs of exacerbation.  Significant emphysema on imaging  -- Continue albuterol prn    Admitted as inpatient. troponins were wnl. Cath 3/26 showed prox 70% and distal 80% stenosis, s/p PCI with AKASH x 2. Has been chest pain free. Will plan on discharge today. Emphasized med compliance, and f/u for refills with PCP/cardiology. Did  on smoking cessation and offered resources however, she declines.      Consultations This Hospital Stay   PHARMACY IP CONSULT  PHARMACY IP CONSULT  CARDIOLOGY IP CONSULT  NUTRITION SERVICES ADULT IP CONSULT  CARDIAC REHAB IP CONSULT  PHARMACY IP CONSULT  PHARMACY IP CONSULT  SMOKING CESSATION PROGRAM IP CONSULT    Code Status   Full Code    Time Spent on this Encounter   I, Marques Cuevas MD, personally saw the patient today and spent greater than 30 minutes discharging this patient.       Marques Cuevas MD  [place] [address] [facility]  [address] [place] [address]  Phone: [phone]  Fax: [phone]  ______________________________________________________________________    Physical Exam   Vital Signs: Temp: 97  F (36.1  C) Temp src: Axillary BP: 110/63 Pulse: 78   Resp: 16 SpO2: 99 % O2 Device: None (Room air) Oxygen Delivery: 3 LPM  Weight: 97 lbs 14.4 oz    Gen - AAO x 3 in NAD.  Lungs - CTA B.  Heart - RR,S1+S2 nml, no m/g/r.  Abd - soft, NT, ND, + BS.  Ext - no edema.       Primary Care  Physician   [facility]    Discharge Orders      CARDIAC REHAB REFERRAL      Follow-up and recommended labs and tests     Follow up with primary care provider, [facility], within 7 days for hospital follow- up.  No follow up labs or test are needed.   Follow up with cardiology as scheduled and for future refills.     Activity    Your activity upon discharge: activity as tolerated     Full Code     Diet    Follow this diet upon discharge: Orders Placed This Encounter      Low Saturated Fat Na <2400 mg       Significant Results and Procedures   Results for orders placed or performed during the hospital encounter of 03/24/21   Chest XR,  PA & LAT    Narrative    CHEST TWO VIEW   3/24/2021 3:55 PM     HISTORY: Chest pain.    COMPARISON: Chest x-ray on 2/20/2019.      Impression    IMPRESSION: AP and lateral views of the chest were obtained.  Cardiomediastinal silhouette is within normal limits. No suspicious  focal pulmonary opacities. No significant pleural effusion or  pneumothorax.     RADHA GRAJEDA MD   CT Chest Pulmonary Embolism w Contrast    Narrative    EXAM: CT CHEST PULMONARY EMBOLISM W CONTRAST  LOCATION: [facility]  DATE/TIME: 3/24/2021 5:18 PM    INDICATION: PE suspected, low/intermediate prob, positive D-dimer  COMPARISON: None.  TECHNIQUE: CT chest pulmonary angiogram during arterial phase injection of IV contrast. Multiplanar reformats and MIP reconstructions were performed. Dose reduction techniques were used.   CONTRAST: 52mL Isovue-370    FINDINGS:  ANGIOGRAM CHEST: No evidence for pulmonary embolism. Pulmonary arteries normal in caliber. Thoracic aorta normal in caliber. No aortic dissection or other acute abnormality.    HEART: Cardiac chambers within normal limits. No pericardial effusion. Moderate coronary artery calcification.    LUNGS AND PLEURA: Moderate emphysema. No pulmonary mass. Mild intralobular septal thickening. Greater than expected  groundglass density at the lung bases likely an element of atelectasis, pneumonia also considered. No pleural effusion or pneumothorax.    MEDIASTINUM: Mildly enlarged bilateral hilar lymph nodes. No mediastinal or axillary adenopathy.    LIMITED UPPER ABDOMEN: Negative.    MUSCULOSKELETAL: Negative.      Impression    IMPRESSION:  1.  No evidence for pulmonary embolism.   2.  Moderate emphysema.  3.  Mild groundglass density and interstitial edema in the lower lungs. Please correlate clinically for infection versus CHF.   Echocardiogram Complete    Narrative    [id number]  [id number]  [id number]  [id number]^STEFAN^DAWN     [facility]  Echocardiography Laboratory  [address] [place] [address]     Name: NEIL MANUEL  MRN: [id number]  : 1960  Study Date: 2021 10:08 AM  Age: 61 yrs  Gender: Female  Patient Location: [facility]  Reason For Study: Chest Pain  Ordering Physician: DAWN AVILES  Referring Physician: Socrates [facility]  Performed By: Josefina Adam     BSA: 1.4 m2  Height: 62 in  Weight: 98 lb  BP: 155/106 mmHg  ______________________________________________________________________________  Procedure  Complete Portable Echo Adult.  ______________________________________________________________________________  Interpretation Summary     The visual ejection fraction is estimated at 55-60%.  No regional wall motion abnormalities noted.  Normal transthoracic echocardiogram.  ______________________________________________________________________________  Left Ventricle  The left ventricle is normal in structure, function and size. The visual  ejection fraction is estimated at 55-60%. Left ventricular diastolic function  is normal. No regional wall motion abnormalities noted.     Right Ventricle  The right ventricle is normal in structure, function and size.     Atria  Normal left atrial size. Right atrial size is normal.     Mitral Valve  The mitral valve is normal in structure  and function.     Tricuspid Valve  Normal tricuspid valve.     Aortic Valve  There is trivial trileaflet aortic sclerosis.     Pulmonic Valve  Normal pulmonic valve.     Vessels  The aortic root is normal size. Normal size ascending aorta.     Pericardium  There is no pericardial effusion.     ______________________________________________________________________________  MMode/2D Measurements & Calculations  IVSd: 0.52 cm  LVIDd: 4.1 cm  LVIDs: 2.7 cm  LVPWd: 0.67 cm  FS: 33.7 %     LV mass(C)d: 67.1 grams  LV mass(C)dI: 47.5 grams/m2  Ao root diam: 2.9 cm  asc Aorta Diam: 2.7 cm  LVOT diam: 1.8 cm  LVOT area: 2.5 cm2  LA Volume (BP): 34.0 ml  LA Volume Index (BP): 24.1 ml/m2  RWT: 0.32     Doppler Measurements & Calculations  MV E max yessica: 70.3 cm/sec  MV A max yessica: 71.3 cm/sec  MV E/A: 0.99     MV dec time: 0.19 sec  PA acc time: 0.13 sec  E/E' av.6  Lateral E/e': 6.3  Medial E/e': 8.9     ______________________________________________________________________________  Report approved by: Jackie Anderson 2021 11:36 AM         Cardiac Catheterization    Narrative    1. Unstable angina  2. Status post successful PCI with AKASH placement in the proximal and   distal RCA       Discharge Medications   Current Discharge Medication List      START taking these medications    Details   aspirin (ASA) 81 MG EC tablet Take 1 tablet (81 mg) by mouth daily  Qty: 30 tablet, Refills: 0    Associated Diagnoses: Unstable angina (H)      atorvastatin (LIPITOR) 40 MG tablet Take 1 tablet (40 mg) by mouth every evening  Qty: 30 tablet, Refills: 0    Associated Diagnoses: Unstable angina (H)      metoprolol tartrate (LOPRESSOR) 25 MG tablet Take 1 tablet (25 mg) by mouth 2 times daily  Qty: 60 tablet, Refills: 0    Associated Diagnoses: Unstable angina (H)      ticagrelor (BRILINTA) 90 MG tablet Take 1 tablet (90 mg) by mouth every 12 hours  Qty: 60 tablet, Refills: 0    Associated Diagnoses: Unstable angina (H)        "  CONTINUE these medications which have NOT CHANGED    Details   albuterol (PROAIR HFA/PROVENTIL HFA/VENTOLIN HFA) 108 (90 Base) MCG/ACT inhaler Inhale 2 puffs into the lungs every 6 hours  Qty: 1 Inhaler, Refills: 1    Comments: Pharmacy may dispense brand covered by insurance (Proair, or proventil or ventolin or generic albuterol inhaler)  Associated Diagnoses: Wheezing      fluticasone (FLONASE) 50 MCG/ACT nasal spray Spray 1 spray into both nostrils daily as needed for rhinitis or allergies         STOP taking these medications       amLODIPine (NORVASC) 10 MG tablet Comments:   Reason for Stopping:             Allergies   Allergies   Allergen Reactions     Erythromycin      Aminophylline      \"got jittery\"     "

## 2021-03-27 NOTE — PROGRESS NOTES
[facility] Cardiology Progress Note       Assessment and Plan:   ACS with dramatic EKG changes, but negative trops with preserved LVEF.  Stenting to prox and distal RCA.  Low BP in hospital, will hold lisinopril for now.  Can re institute later in clinic if hypertensive.  Pt strongly encouraged to give up smoking.  Advised on importance of DAPT continuation.              Interval History:   Hx noted, no further cp.  Feels well and would like to go home.  R wrist access site clean with no hematoma.                  Medications:     Current Facility-Administered Medications   Medication     acetaminophen (TYLENOL) tablet 650 mg     albuterol (PROAIR HFA/PROVENTIL HFA/VENTOLIN HFA) 108 (90 Base) MCG/ACT inhaler 2 puff     aspirin EC tablet 81 mg     atorvastatin (LIPITOR) tablet 40 mg     Continuing ACE inhibitor/ARB/ARNI from home medication list OR ACE inhibitor/ARB order already placed during this visit     Continuing beta blocker from home medication list OR beta blocker order already placed during this visit     Continuing statin from home medication list OR statin order already placed during this visit     fluticasone (FLONASE) 50 MCG/ACT spray 1 spray     HOLD: Metformin and metformin containing medications on day of procedure and 48 hours after IV contrast given for patients with acute kidney injury or severe chronic kidney disease (stage IV or stage V; i.e., eGFR less than 30)     hydrALAZINE (APRESOLINE) injection 10 mg     lidocaine (LMX4) cream     lidocaine 1 % 0.1-1 mL     lisinopril (ZESTRIL) tablet 5 mg     melatonin tablet 1 mg     metoprolol (LOPRESSOR) injection 5-10 mg     metoprolol tartrate (LOPRESSOR) tablet 25 mg     midazolam (VERSED) injection 0.5-1 mg     naloxone (NARCAN) injection 0.2 mg    Or     naloxone (NARCAN) injection 0.4 mg    Or     naloxone (NARCAN) injection 0.2 mg    Or     naloxone (NARCAN) injection 0.4 mg     nitroGLYcerin (NITROSTAT) sublingual tablet 0.4 mg      "ondansetron (ZOFRAN-ODT) ODT tab 4 mg    Or     ondansetron (ZOFRAN) injection 4 mg     oxyCODONE (ROXICODONE) tablet 5 mg    Or     oxyCODONE (ROXICODONE) tablet 10 mg     Patient is already receiving anticoagulation with heparin, enoxaparin (LOVENOX), warfarin (COUMADIN)  or other anticoagulant medication     Percutaneous Coronary Intervention orders placed (this is information for BPA alerting)     sodium chloride (PF) 0.9% PF flush 3 mL     sodium chloride (PF) 0.9% PF flush 3 mL     sodium chloride (PF) 0.9% PF flush 3 mL     ticagrelor (BRILINTA) tablet 90 mg             Physical Exam:   Blood pressure 102/66, pulse 89, temperature 97  F (36.1  C), temperature source Axillary, resp. rate 16, height 1.575 m (5' 2\"), weight 44.4 kg (97 lb 14.4 oz), SpO2 99 %, not currently breastfeeding.  Wt Readings from Last 4 Encounters:   21 44.4 kg (97 lb 14.4 oz)   21 45.4 kg (100 lb)   21 44.6 kg (98 lb 6.4 oz)   20 42.1 kg (92 lb 12.8 oz)         Vital Sign Ranges  Temperature Temp  Av.7  F (36.5  C)  Min: 97  F (36.1  C)  Max: 99  F (37.2  C)   Blood pressure Systolic (24hrs), Av , Min:83 , Max:131        Diastolic (24hrs), Av, Min:57, Max:79      Pulse Pulse  Av.9  Min: 58  Max: 89   Respirations Resp  Av.5  Min: 16  Max: 18   Pulse oximetry SpO2  Av.4 %  Min: 95 %  Max: 99 %         Intake/Output Summary (Last 24 hours) at 3/27/2021 1032  Last data filed at 3/27/2021 0849  Gross per 24 hour   Intake 240 ml   Output --   Net 240 ml          Cardiovascular:   Normal apical impulse, regular rate and rhythm, normal S1 and S2, no S3 or S4, and no murmur noted   Chest clear.  No peripheral oedema         Data:     Labs:  Lab Results   Component Value Date     2021      Lab Results   Component Value Date    CHLORIDE 109 2021    Lab Results   Component Value Date    BUN 14 2021      Lab Results   Component Value Date    POTASSIUM 3.7 2021    " Lab Results   Component Value Date    CO2 24 03/27/2021    Lab Results   Component Value Date    CR 0.71 03/27/2021        Lab Results   Component Value Date    WBC 12.5 (H) 03/27/2021    HGB 11.7 03/27/2021    HCT 34.7 (L) 03/27/2021    MCV 93 03/27/2021     03/27/2021     Lab Results   Component Value Date    TROPONIN <0.04 06/21/2006    TROPI <0.015 03/25/2021           Attestation:  I have reviewed today's vital signs, notes, medications, labs and imaging.         DR SANTOS ZAMBRANO MD 3/27/2021  10:32 AM

## 2021-03-27 NOTE — PLAN OF CARE
A/O x 4. VSS on RA. Tele: SR, inverted T waves. Denies chest pain, SOB, N/V. TR band off at 2119. CMS intact, normal pulse, no bleeding. Slept with arm board in place. Plan to discharge today. Will continue with POC.

## 2021-03-29 ENCOUNTER — TELEPHONE (OUTPATIENT)
Dept: INTERNAL MEDICINE | Facility: CLINIC | Age: 61
End: 2021-03-29

## 2021-03-29 NOTE — TELEPHONE ENCOUNTER
IP F/U    Date: 3-27-21  Diagnosis: chest pain  Is patient active in care coordination? No  Was patient in TCU? No    ED / Discharge Outreach Protocol    Patient Contact    Attempt # 1    Was call answered?  No.  Left message on voicemail with information to call me back.

## 2021-03-30 ENCOUNTER — TELEPHONE (OUTPATIENT)
Dept: CARDIOLOGY | Facility: CLINIC | Age: 61
End: 2021-03-30

## 2021-03-30 DIAGNOSIS — I25.10 CAD (CORONARY ARTERY DISEASE): Primary | ICD-10-CM

## 2021-03-30 NOTE — TELEPHONE ENCOUNTER
As long as she is free of angina no  rush on prn NTG. If she wants a prescription, we can provide one.  I will CC [facility]. Otherwise we can wait until she is seen as an outpatient. Thanks

## 2021-03-30 NOTE — TELEPHONE ENCOUNTER
Patient was evaluated by cardiology while inpatient for unstable angina and EKG changes. PMH: smoker, COPD, HTN. 3/26/21: Coronary angiogram via RRA resulted in PCI with AKASH to proximal to dRCA. Echo showed EF 55% without WMA. Started on ASA, Lipitor, Metoprolol and Brilinta. PTA Norvasc was discontinued. RN will call and confirm with patient that she was d/c with an adequate supply of the antiplatelet Brilinta, and reminded of importance of taking without interruption. Pt does not have an Rx for PRN SL Nitroglycerin. Cardiology f/u orders have not been placed. Will route this note to Dr. Thurston for further review. Cardiac rehab still needs to be scheduled. RUDDY Jimenez RN.

## 2021-03-30 NOTE — TELEPHONE ENCOUNTER
Writer attempted to call pt, but no answer. VM left to return my phone call. Order entered for cardiology ERINN f/u visit. RUDDY Jimenez RN.

## 2021-03-30 NOTE — TELEPHONE ENCOUNTER
"Please advise if hospital follow-up can be scheduled in virtual spot next week. Patient needs hospital follow-up appointment and wants to see primary care provider but only virtual visits open. Please advise,     Thank you       Hospital/TCU/ED for chronic condition Discharge Protocol    \"Hi, my name is Stephanie Silva RN, a registered nurse, and I am calling from [facility].  I am calling to follow up and see how things are going for you after your recent emergency visit/hospital/TCU stay.\"    Tell me how you are doing now that you are home?\" doing better, improved from in hospital.       Discharge Instructions    \"Let's review your discharge instructions.  What is/are the follow-up recommendations?  Pt. Response: Follow up with primary care provider, [facility], within 7 days for hospital follow- up.  No follow up labs or test are needed.   Follow up with cardiology as scheduled and for future refills    \"Has an appointment with your primary care provider been scheduled?\"   No (schedule appointment)    \"When you see the provider, I would recommend that you bring your medications with you.\"    Medications    \"Tell me what changed about your medicines when you discharged?\"    Changes to chronic meds?    2 or more - Epic MTM referral needed    \"What questions do you have about your medications?\"    None     New diagnoses of heart failure, COPD, diabetes, or MI?    No              Post Discharge Medication Reconciliation Status: discharge medications reconciled and changed, per note/orders.    Was MTM referral placed (*Make sure to put transitions as reason for referral)?   Yes - \"The Medication Therapy  will call you within the next few days to schedule an appointment with an MTM pharmacist to discuss your medicines and make sure they are working as well as they possibly can for you. This visit can be done in a [facility] or on the phone and is at no charge " "to you.\"    Call Summary    \"What questions or concerns do you have about your recent visit and your follow-up care?\"     none    \"If you have questions or things don't continue to improve, we encourage you contact us through the main clinic number (give number).  Even if the clinic is not open, triage nurses are available 24/7 to help you.     We would like you to know that our clinic has extended hours (provide information).  We also have urgent care (provide details on closest location and hours/contact info)\"      \"Thank you for your time and take care!\"               "

## 2021-03-31 NOTE — TELEPHONE ENCOUNTER
Second attempt at trying to contact pt, but again, no answer. VM left to return my phone message and again reminded of need to call to schedule f/u cardiology appt as ordered. Scheduling phone number provided. RUDDY Jimenez RN.

## 2021-04-01 RX ORDER — NITROGLYCERIN 0.4 MG/1
TABLET SUBLINGUAL
Qty: 30 TABLET | Refills: 0 | Status: SHIPPED | OUTPATIENT
Start: 2021-04-01

## 2021-04-01 NOTE — TELEPHONE ENCOUNTER
"Writer called pt and she denies any chest pain, SOB, fever or lightheadedness. Does c/o her chronic back pain, \"from a bulging disc in my back.\" RRA access site is without signs of infection, swelling or bleeding. Denies any medication questions and states that she has an adequate supply of Brilinta and has been taking every 12 hrs as instructed. Writer instructed pt on PRN SL Nitroglycerin, including indications, storage and expiration period once bottle opened, administration, expected side effects and when to call the clinic vs 911. Pt verbalized understanding and RX escribed to pt's [facility] per her request. Reminded of importance of scheduling cardiology f/u and scheduling phone number provided. States cardiac rehab has been in contact with her, but pt is currently declining those services. Pt verbalized understanding of all instructions without further questions. RUDDY Jimenez RN.  "

## 2021-04-06 ENCOUNTER — HOSPITAL ENCOUNTER (OUTPATIENT)
Facility: CLINIC | Age: 61
Setting detail: OBSERVATION
Discharge: HOME | End: 2021-04-06
Attending: EMERGENCY MEDICINE | Admitting: INTERNAL MEDICINE
Payer: COMMERCIAL

## 2021-04-06 ENCOUNTER — APPOINTMENT (OUTPATIENT)
Dept: GENERAL RADIOLOGY | Facility: CLINIC | Age: 61
End: 2021-04-06
Attending: EMERGENCY MEDICINE
Payer: COMMERCIAL

## 2021-04-06 VITALS
WEIGHT: 108.8 LBS | RESPIRATION RATE: 16 BRPM | BODY MASS INDEX: 20.02 KG/M2 | DIASTOLIC BLOOD PRESSURE: 64 MMHG | HEART RATE: 79 BPM | HEIGHT: 62 IN | OXYGEN SATURATION: 99 % | SYSTOLIC BLOOD PRESSURE: 118 MMHG | TEMPERATURE: 96.8 F

## 2021-04-06 DIAGNOSIS — K52.9 GASTROENTERITIS: ICD-10-CM

## 2021-04-06 DIAGNOSIS — R07.89 OTHER CHEST PAIN: Primary | ICD-10-CM

## 2021-04-06 DIAGNOSIS — R07.9 ACUTE CHEST PAIN: ICD-10-CM

## 2021-04-06 DIAGNOSIS — I25.110 CORONARY ARTERY DISEASE INVOLVING NATIVE HEART WITH UNSTABLE ANGINA PECTORIS, UNSPECIFIED VESSEL OR LESION TYPE (H): ICD-10-CM

## 2021-04-06 LAB
ANION GAP SERPL CALCULATED.3IONS-SCNC: 7 MMOL/L (ref 3–14)
BASOPHILS # BLD AUTO: 0.1 10E9/L (ref 0–0.2)
BASOPHILS NFR BLD AUTO: 0.9 %
BUN SERPL-MCNC: 12 MG/DL (ref 7–30)
CALCIUM SERPL-MCNC: 8.8 MG/DL (ref 8.5–10.1)
CHLORIDE SERPL-SCNC: 105 MMOL/L (ref 94–109)
CO2 SERPL-SCNC: 24 MMOL/L (ref 20–32)
CREAT SERPL-MCNC: 0.72 MG/DL (ref 0.52–1.04)
DIFFERENTIAL METHOD BLD: ABNORMAL
EOSINOPHIL # BLD AUTO: 0.3 10E9/L (ref 0–0.7)
EOSINOPHIL NFR BLD AUTO: 2.3 %
ERYTHROCYTE [DISTWIDTH] IN BLOOD BY AUTOMATED COUNT: 12.2 % (ref 10–15)
GFR SERPL CREATININE-BSD FRML MDRD: 90 ML/MIN/{1.73_M2}
GLUCOSE SERPL-MCNC: 87 MG/DL (ref 70–99)
HCT VFR BLD AUTO: 29.6 % (ref 35–47)
HGB BLD-MCNC: 10.1 G/DL (ref 11.7–15.7)
IMM GRANULOCYTES # BLD: 0.1 10E9/L (ref 0–0.4)
IMM GRANULOCYTES NFR BLD: 0.4 %
INTERPRETATION ECG - MUSE: NORMAL
LABORATORY COMMENT REPORT: NORMAL
LYMPHOCYTES # BLD AUTO: 3.2 10E9/L (ref 0.8–5.3)
LYMPHOCYTES NFR BLD AUTO: 27.6 %
MCH RBC QN AUTO: 31.3 PG (ref 26.5–33)
MCHC RBC AUTO-ENTMCNC: 34.1 G/DL (ref 31.5–36.5)
MCV RBC AUTO: 92 FL (ref 78–100)
MONOCYTES # BLD AUTO: 1.2 10E9/L (ref 0–1.3)
MONOCYTES NFR BLD AUTO: 10.6 %
NEUTROPHILS # BLD AUTO: 6.8 10E9/L (ref 1.6–8.3)
NEUTROPHILS NFR BLD AUTO: 58.2 %
NRBC # BLD AUTO: 0 10*3/UL
NRBC BLD AUTO-RTO: 0 /100
PLATELET # BLD AUTO: 353 10E9/L (ref 150–450)
POTASSIUM SERPL-SCNC: 3.6 MMOL/L (ref 3.4–5.3)
RBC # BLD AUTO: 3.23 10E12/L (ref 3.8–5.2)
SARS-COV-2 RNA RESP QL NAA+PROBE: NEGATIVE
SODIUM SERPL-SCNC: 136 MMOL/L (ref 133–144)
SPECIMEN SOURCE: NORMAL
TROPONIN I SERPL-MCNC: <0.015 UG/L (ref 0–0.04)
UFH PPP CHRO-ACNC: 0.11 IU/ML
WBC # BLD AUTO: 11.7 10E9/L (ref 4–11)

## 2021-04-06 PROCEDURE — 85520 HEPARIN ASSAY: CPT | Performed by: PHYSICIAN ASSISTANT

## 2021-04-06 PROCEDURE — 93005 ELECTROCARDIOGRAM TRACING: CPT | Mod: 59

## 2021-04-06 PROCEDURE — 258N000003 HC RX IP 258 OP 636: Performed by: INTERNAL MEDICINE

## 2021-04-06 PROCEDURE — C9803 HOPD COVID-19 SPEC COLLECT: HCPCS

## 2021-04-06 PROCEDURE — 99152 MOD SED SAME PHYS/QHP 5/>YRS: CPT | Performed by: INTERNAL MEDICINE

## 2021-04-06 PROCEDURE — 80048 BASIC METABOLIC PNL TOTAL CA: CPT | Performed by: EMERGENCY MEDICINE

## 2021-04-06 PROCEDURE — 93454 CORONARY ARTERY ANGIO S&I: CPT | Performed by: INTERNAL MEDICINE

## 2021-04-06 PROCEDURE — 272N000001 HC OR GENERAL SUPPLY STERILE: Performed by: INTERNAL MEDICINE

## 2021-04-06 PROCEDURE — 71046 X-RAY EXAM CHEST 2 VIEWS: CPT

## 2021-04-06 PROCEDURE — 96365 THER/PROPH/DIAG IV INF INIT: CPT

## 2021-04-06 PROCEDURE — C1894 INTRO/SHEATH, NON-LASER: HCPCS | Performed by: INTERNAL MEDICINE

## 2021-04-06 PROCEDURE — 250N000009 HC RX 250: Performed by: INTERNAL MEDICINE

## 2021-04-06 PROCEDURE — 85025 COMPLETE CBC W/AUTO DIFF WBC: CPT | Performed by: EMERGENCY MEDICINE

## 2021-04-06 PROCEDURE — 87635 SARS-COV-2 COVID-19 AMP PRB: CPT | Performed by: EMERGENCY MEDICINE

## 2021-04-06 PROCEDURE — 99215 OFFICE O/P EST HI 40 MIN: CPT | Performed by: INTERNAL MEDICINE

## 2021-04-06 PROCEDURE — 99285 EMERGENCY DEPT VISIT HI MDM: CPT | Mod: 25

## 2021-04-06 PROCEDURE — 250N000013 HC RX MED GY IP 250 OP 250 PS 637: Performed by: INTERNAL MEDICINE

## 2021-04-06 PROCEDURE — 84484 ASSAY OF TROPONIN QUANT: CPT | Performed by: EMERGENCY MEDICINE

## 2021-04-06 PROCEDURE — 84484 ASSAY OF TROPONIN QUANT: CPT | Mod: 91 | Performed by: PHYSICIAN ASSISTANT

## 2021-04-06 PROCEDURE — G0378 HOSPITAL OBSERVATION PER HR: HCPCS

## 2021-04-06 PROCEDURE — 250N000011 HC RX IP 250 OP 636: Performed by: INTERNAL MEDICINE

## 2021-04-06 PROCEDURE — 96366 THER/PROPH/DIAG IV INF ADDON: CPT | Mod: 59

## 2021-04-06 PROCEDURE — 36415 COLL VENOUS BLD VENIPUNCTURE: CPT | Performed by: PHYSICIAN ASSISTANT

## 2021-04-06 PROCEDURE — 250N000011 HC RX IP 250 OP 636: Performed by: PHYSICIAN ASSISTANT

## 2021-04-06 PROCEDURE — 250N000013 HC RX MED GY IP 250 OP 250 PS 637: Performed by: EMERGENCY MEDICINE

## 2021-04-06 PROCEDURE — 99236 HOSP IP/OBS SAME DATE HI 85: CPT | Performed by: PHYSICIAN ASSISTANT

## 2021-04-06 PROCEDURE — C1887 CATHETER, GUIDING: HCPCS | Performed by: INTERNAL MEDICINE

## 2021-04-06 PROCEDURE — 250N000013 HC RX MED GY IP 250 OP 250 PS 637: Performed by: PHYSICIAN ASSISTANT

## 2021-04-06 PROCEDURE — 36415 COLL VENOUS BLD VENIPUNCTURE: CPT | Performed by: EMERGENCY MEDICINE

## 2021-04-06 RX ORDER — LIDOCAINE 40 MG/G
CREAM TOPICAL
Status: DISCONTINUED | OUTPATIENT
Start: 2021-04-06 | End: 2021-04-06 | Stop reason: HOSPADM

## 2021-04-06 RX ORDER — LORAZEPAM 0.5 MG/1
0.5 TABLET ORAL
Status: DISCONTINUED | OUTPATIENT
Start: 2021-04-06 | End: 2021-04-06 | Stop reason: HOSPADM

## 2021-04-06 RX ORDER — EPTIFIBATIDE 2 MG/ML
180 INJECTION, SOLUTION INTRAVENOUS EVERY 10 MIN PRN
Status: DISCONTINUED | OUTPATIENT
Start: 2021-04-06 | End: 2021-04-06 | Stop reason: HOSPADM

## 2021-04-06 RX ORDER — ASPIRIN 81 MG/1
162 TABLET, CHEWABLE ORAL ONCE
Status: COMPLETED | OUTPATIENT
Start: 2021-04-06 | End: 2021-04-06

## 2021-04-06 RX ORDER — NALOXONE HYDROCHLORIDE 0.4 MG/ML
0.4 INJECTION, SOLUTION INTRAMUSCULAR; INTRAVENOUS; SUBCUTANEOUS
Status: DISCONTINUED | OUTPATIENT
Start: 2021-04-06 | End: 2021-04-06 | Stop reason: HOSPADM

## 2021-04-06 RX ORDER — HEPARIN SODIUM 10000 [USP'U]/100ML
0-5000 INJECTION, SOLUTION INTRAVENOUS CONTINUOUS
Status: DISCONTINUED | OUTPATIENT
Start: 2021-04-06 | End: 2021-04-06 | Stop reason: HOSPADM

## 2021-04-06 RX ORDER — METOPROLOL TARTRATE 25 MG/1
25 TABLET, FILM COATED ORAL 2 TIMES DAILY
Status: DISCONTINUED | OUTPATIENT
Start: 2021-04-06 | End: 2021-04-06

## 2021-04-06 RX ORDER — ATORVASTATIN CALCIUM 40 MG/1
40 TABLET, FILM COATED ORAL EVERY EVENING
Status: DISCONTINUED | OUTPATIENT
Start: 2021-04-06 | End: 2021-04-06 | Stop reason: HOSPADM

## 2021-04-06 RX ORDER — NITROGLYCERIN 0.4 MG/1
0.4 TABLET SUBLINGUAL EVERY 5 MIN PRN
Status: DISCONTINUED | OUTPATIENT
Start: 2021-04-06 | End: 2021-04-06 | Stop reason: HOSPADM

## 2021-04-06 RX ORDER — ASPIRIN 81 MG/1
81 TABLET ORAL DAILY
Status: DISCONTINUED | OUTPATIENT
Start: 2021-04-06 | End: 2021-04-06

## 2021-04-06 RX ORDER — HEPARIN SODIUM 1000 [USP'U]/ML
INJECTION, SOLUTION INTRAVENOUS; SUBCUTANEOUS
Status: DISCONTINUED | OUTPATIENT
Start: 2021-04-06 | End: 2021-04-06 | Stop reason: HOSPADM

## 2021-04-06 RX ORDER — NITROGLYCERIN 5 MG/ML
VIAL (ML) INTRAVENOUS
Status: DISCONTINUED | OUTPATIENT
Start: 2021-04-06 | End: 2021-04-06 | Stop reason: HOSPADM

## 2021-04-06 RX ORDER — CLOPIDOGREL BISULFATE 75 MG/1
75 TABLET ORAL DAILY
Status: DISCONTINUED | OUTPATIENT
Start: 2021-04-07 | End: 2021-04-06 | Stop reason: HOSPADM

## 2021-04-06 RX ORDER — DOPAMINE HYDROCHLORIDE 160 MG/100ML
2-20 INJECTION, SOLUTION INTRAVENOUS CONTINUOUS PRN
Status: DISCONTINUED | OUTPATIENT
Start: 2021-04-06 | End: 2021-04-06 | Stop reason: HOSPADM

## 2021-04-06 RX ORDER — SODIUM CHLORIDE 9 MG/ML
INJECTION, SOLUTION INTRAVENOUS CONTINUOUS
Status: DISCONTINUED | OUTPATIENT
Start: 2021-04-06 | End: 2021-04-06 | Stop reason: HOSPADM

## 2021-04-06 RX ORDER — VERAPAMIL HYDROCHLORIDE 2.5 MG/ML
INJECTION, SOLUTION INTRAVENOUS
Status: DISCONTINUED | OUTPATIENT
Start: 2021-04-06 | End: 2021-04-06 | Stop reason: HOSPADM

## 2021-04-06 RX ORDER — ASPIRIN 81 MG/1
81 TABLET ORAL DAILY
Status: DISCONTINUED | OUTPATIENT
Start: 2021-04-07 | End: 2021-04-06 | Stop reason: HOSPADM

## 2021-04-06 RX ORDER — METOPROLOL TARTRATE 25 MG/1
25 TABLET, FILM COATED ORAL 2 TIMES DAILY
Status: DISCONTINUED | OUTPATIENT
Start: 2021-04-06 | End: 2021-04-06 | Stop reason: HOSPADM

## 2021-04-06 RX ORDER — ACETAMINOPHEN 650 MG/1
650 SUPPOSITORY RECTAL EVERY 4 HOURS PRN
Status: DISCONTINUED | OUTPATIENT
Start: 2021-04-06 | End: 2021-04-06 | Stop reason: HOSPADM

## 2021-04-06 RX ORDER — NITROGLYCERIN 0.4 MG/1
0.4 TABLET SUBLINGUAL EVERY 5 MIN PRN
Status: DISCONTINUED | OUTPATIENT
Start: 2021-04-06 | End: 2021-04-06

## 2021-04-06 RX ORDER — NALOXONE HYDROCHLORIDE 0.4 MG/ML
0.2 INJECTION, SOLUTION INTRAMUSCULAR; INTRAVENOUS; SUBCUTANEOUS
Status: DISCONTINUED | OUTPATIENT
Start: 2021-04-06 | End: 2021-04-06 | Stop reason: HOSPADM

## 2021-04-06 RX ORDER — HEPARIN SODIUM 1000 [USP'U]/ML
INJECTION, SOLUTION INTRAVENOUS; SUBCUTANEOUS
Status: DISCONTINUED
Start: 2021-04-06 | End: 2021-04-06 | Stop reason: HOSPADM

## 2021-04-06 RX ORDER — FENTANYL CITRATE 50 UG/ML
INJECTION, SOLUTION INTRAMUSCULAR; INTRAVENOUS
Status: DISCONTINUED | OUTPATIENT
Start: 2021-04-06 | End: 2021-04-06 | Stop reason: HOSPADM

## 2021-04-06 RX ORDER — ALBUTEROL SULFATE 90 UG/1
2 AEROSOL, METERED RESPIRATORY (INHALATION) EVERY 6 HOURS PRN
Status: DISCONTINUED | OUTPATIENT
Start: 2021-04-06 | End: 2021-04-06 | Stop reason: HOSPADM

## 2021-04-06 RX ORDER — CLOPIDOGREL BISULFATE 75 MG/1
300 TABLET ORAL ONCE
Status: COMPLETED | OUTPATIENT
Start: 2021-04-06 | End: 2021-04-06

## 2021-04-06 RX ORDER — ALBUTEROL SULFATE 90 UG/1
2 AEROSOL, METERED RESPIRATORY (INHALATION) EVERY 6 HOURS PRN
COMMUNITY

## 2021-04-06 RX ORDER — ACETAMINOPHEN 325 MG/1
650 TABLET ORAL ONCE
Status: COMPLETED | OUTPATIENT
Start: 2021-04-06 | End: 2021-04-06

## 2021-04-06 RX ORDER — VERAPAMIL HYDROCHLORIDE 2.5 MG/ML
INJECTION, SOLUTION INTRAVENOUS
Status: DISCONTINUED
Start: 2021-04-06 | End: 2021-04-06 | Stop reason: HOSPADM

## 2021-04-06 RX ORDER — LIDOCAINE HYDROCHLORIDE 10 MG/ML
INJECTION, SOLUTION EPIDURAL; INFILTRATION; INTRACAUDAL; PERINEURAL
Status: DISCONTINUED
Start: 2021-04-06 | End: 2021-04-06 | Stop reason: HOSPADM

## 2021-04-06 RX ORDER — IOPAMIDOL 755 MG/ML
INJECTION, SOLUTION INTRAVASCULAR
Status: DISCONTINUED | OUTPATIENT
Start: 2021-04-06 | End: 2021-04-06 | Stop reason: HOSPADM

## 2021-04-06 RX ORDER — NITROGLYCERIN 5 MG/ML
VIAL (ML) INTRAVENOUS
Status: DISCONTINUED
Start: 2021-04-06 | End: 2021-04-06 | Stop reason: HOSPADM

## 2021-04-06 RX ORDER — ACETAMINOPHEN 325 MG/1
650 TABLET ORAL EVERY 4 HOURS PRN
Status: DISCONTINUED | OUTPATIENT
Start: 2021-04-06 | End: 2021-04-06 | Stop reason: HOSPADM

## 2021-04-06 RX ORDER — MAGNESIUM HYDROXIDE/ALUMINUM HYDROXICE/SIMETHICONE 120; 1200; 1200 MG/30ML; MG/30ML; MG/30ML
30 SUSPENSION ORAL EVERY 4 HOURS PRN
Status: DISCONTINUED | OUTPATIENT
Start: 2021-04-06 | End: 2021-04-06 | Stop reason: HOSPADM

## 2021-04-06 RX ORDER — ATROPINE SULFATE 0.1 MG/ML
0.5 INJECTION INTRAVENOUS
Status: DISCONTINUED | OUTPATIENT
Start: 2021-04-06 | End: 2021-04-06 | Stop reason: HOSPADM

## 2021-04-06 RX ORDER — POTASSIUM CHLORIDE 1500 MG/1
TABLET, EXTENDED RELEASE ORAL
Status: DISCONTINUED
Start: 2021-04-06 | End: 2021-04-06 | Stop reason: HOSPADM

## 2021-04-06 RX ORDER — FENTANYL CITRATE 50 UG/ML
25-50 INJECTION, SOLUTION INTRAMUSCULAR; INTRAVENOUS
Status: ACTIVE | OUTPATIENT
Start: 2021-04-06 | End: 2021-04-06

## 2021-04-06 RX ORDER — ATORVASTATIN CALCIUM 40 MG/1
40 TABLET, FILM COATED ORAL DAILY
Status: DISCONTINUED | OUTPATIENT
Start: 2021-04-06 | End: 2021-04-06

## 2021-04-06 RX ORDER — EPTIFIBATIDE 2 MG/ML
2 INJECTION, SOLUTION INTRAVENOUS CONTINUOUS PRN
Status: DISCONTINUED | OUTPATIENT
Start: 2021-04-06 | End: 2021-04-06 | Stop reason: HOSPADM

## 2021-04-06 RX ORDER — ARGATROBAN 1 MG/ML
150 INJECTION, SOLUTION INTRAVENOUS
Status: DISCONTINUED | OUTPATIENT
Start: 2021-04-06 | End: 2021-04-06 | Stop reason: HOSPADM

## 2021-04-06 RX ORDER — LIDOCAINE 40 MG/G
CREAM TOPICAL
Status: DISCONTINUED | OUTPATIENT
Start: 2021-04-06 | End: 2021-04-06

## 2021-04-06 RX ORDER — LORAZEPAM 2 MG/ML
0.5 INJECTION INTRAMUSCULAR
Status: DISCONTINUED | OUTPATIENT
Start: 2021-04-06 | End: 2021-04-06 | Stop reason: HOSPADM

## 2021-04-06 RX ORDER — ACETAMINOPHEN 325 MG/1
650 TABLET ORAL EVERY 4 HOURS PRN
Status: DISCONTINUED | OUTPATIENT
Start: 2021-04-06 | End: 2021-04-06

## 2021-04-06 RX ORDER — FAMOTIDINE 20 MG/1
20 TABLET, FILM COATED ORAL 2 TIMES DAILY PRN
Qty: 60 TABLET | Refills: 1 | Status: SHIPPED | OUTPATIENT
Start: 2021-04-06

## 2021-04-06 RX ORDER — FLUMAZENIL 0.1 MG/ML
0.2 INJECTION, SOLUTION INTRAVENOUS
Status: DISCONTINUED | OUTPATIENT
Start: 2021-04-06 | End: 2021-04-06 | Stop reason: HOSPADM

## 2021-04-06 RX ORDER — FENTANYL CITRATE 50 UG/ML
INJECTION, SOLUTION INTRAMUSCULAR; INTRAVENOUS
Status: DISCONTINUED
Start: 2021-04-06 | End: 2021-04-06 | Stop reason: HOSPADM

## 2021-04-06 RX ORDER — DOBUTAMINE HYDROCHLORIDE 200 MG/100ML
2-20 INJECTION INTRAVENOUS CONTINUOUS PRN
Status: DISCONTINUED | OUTPATIENT
Start: 2021-04-06 | End: 2021-04-06 | Stop reason: HOSPADM

## 2021-04-06 RX ORDER — POTASSIUM CHLORIDE 1500 MG/1
20 TABLET, EXTENDED RELEASE ORAL
Status: COMPLETED | OUTPATIENT
Start: 2021-04-06 | End: 2021-04-06

## 2021-04-06 RX ORDER — HEPARIN SODIUM 10000 [USP'U]/100ML
100-1000 INJECTION, SOLUTION INTRAVENOUS CONTINUOUS PRN
Status: DISCONTINUED | OUTPATIENT
Start: 2021-04-06 | End: 2021-04-06 | Stop reason: HOSPADM

## 2021-04-06 RX ORDER — ARGATROBAN 1 MG/ML
350 INJECTION, SOLUTION INTRAVENOUS
Status: DISCONTINUED | OUTPATIENT
Start: 2021-04-06 | End: 2021-04-06 | Stop reason: HOSPADM

## 2021-04-06 RX ORDER — CLOPIDOGREL BISULFATE 75 MG/1
75 TABLET ORAL DAILY
Qty: 90 TABLET | Refills: 1 | Status: SHIPPED | OUTPATIENT
Start: 2021-04-07 | End: 2021-10-01

## 2021-04-06 RX ADMIN — SODIUM CHLORIDE 75 ML/HR: 9 INJECTION, SOLUTION INTRAVENOUS at 15:38

## 2021-04-06 RX ADMIN — NITROGLYCERIN 0.4 MG: 0.4 TABLET SUBLINGUAL at 04:15

## 2021-04-06 RX ADMIN — ACETAMINOPHEN 650 MG: 325 TABLET, FILM COATED ORAL at 07:55

## 2021-04-06 RX ADMIN — ASPIRIN 81 MG CHEWABLE TABLET 162 MG: 81 TABLET CHEWABLE at 11:13

## 2021-04-06 RX ADMIN — HEPARIN SODIUM 550 UNITS/HR: 10000 INJECTION, SOLUTION INTRAVENOUS at 08:30

## 2021-04-06 RX ADMIN — NITROGLYCERIN 0.4 MG: 0.4 TABLET SUBLINGUAL at 05:15

## 2021-04-06 RX ADMIN — ASPIRIN 162 MG: 81 TABLET, CHEWABLE ORAL at 03:48

## 2021-04-06 RX ADMIN — CLOPIDOGREL BISULFATE 300 MG: 75 TABLET ORAL at 11:12

## 2021-04-06 RX ADMIN — ATORVASTATIN CALCIUM 40 MG: 40 TABLET, FILM COATED ORAL at 11:12

## 2021-04-06 RX ADMIN — METOPROLOL TARTRATE 25 MG: 25 TABLET, FILM COATED ORAL at 11:13

## 2021-04-06 ASSESSMENT — ENCOUNTER SYMPTOMS
NAUSEA: 0
MYALGIAS: 1
DIAPHORESIS: 0
BACK PAIN: 1

## 2021-04-06 ASSESSMENT — MIFFLIN-ST. JEOR: SCORE: 1011.76

## 2021-04-06 NOTE — CONSULTS
Cardiology consultation : dictated    Atypical chest pain with known CAD sp recent PCI RCA , troponin levels are normal. Unfortunately although she had chest pressure with new changes on ECG during last admission there was never any troponin elevation so it may be that her initial symptoms were not due to myocardial ischemia despite angiographically significant CAD on her angiogram. She also feels ticagrelor might be causing dyspnea and atypical neck / leg discomfort. She is still smoking    Options would be stress testing or repeat CAG, however patient favors CAG which I feel is reasonable. We  Will switch her from ticagrelor to clopidogrel.     Recommendations  1 ) repeat CAG I have examined the patient, reviewed the history, medications and pre procedural tests. I have explained to the patient the risks of death, MI, stroke, hematoma, possible urgent bypass surgery for failed PCI, use of stents, thienopyridine agents, possible peripheral vascular complications, arrhythmia, the use of FFR in clinical decision-making and alternative of medical therapy alone in regards to left heart catheterization, left ventriculography, coronary angiography, and possible percutaneous coronary intervention. The patient voiced understanding and wishes to proceed. The patient has a good right radial pulse, normal ulnar pulse and a normal Donis's sign.     2 switch from ticagrelor to clopidogrel    3. No smoking    4. Continue remainder of guideline directed medical therapy    Karena

## 2021-04-06 NOTE — PRE-PROCEDURE
GENERAL PRE-PROCEDURE:     ASA Class:  Class 2- mild systemic disease, no acute problems, no functional limitations  Mallampati  :  Grade 2- soft palate, base of uvula, tonsillar pillars, and portion of posterior pharyngeal wall visible

## 2021-04-06 NOTE — PROGRESS NOTES
Post procedure  Access site looks fine. Her stents remain open with no new lesion. I doubt her symptoms are related to CAD    Plan  1) Discharge today ok with us  2) we have switched to clopidogrel  3) stop smoking  4) Follow up with primary care and [facility] clinic with ERINN    We will sign off.

## 2021-04-06 NOTE — PHARMACY-ADMISSION MEDICATION HISTORY
Admission medication history interview status for this patient is complete. See Psychiatric admission navigator for allergy information, prior to admission medications and immunization status.     Medication history interview done, indicate source(s): Patient  Medication history resources : including written lists,  [facility]: [facility]     Changes made to PTA medication list:  Added: none   Deleted: none  Changed: Albuterol scheduled --> Q6H PRN     Actions taken by pharmacist (provider contacted, etc):left reminder for provider to review med history      Additional medication history information:Patient was experiencing an adverse reactions to Brilinta- leg pain . She states that her provider was going to switch her to Plavix       Medication reconciliation/reorder completed by provider prior to medication history? No     Prior to Admission medications    Medication Sig Last Dose Taking? Auth Provider   albuterol (PROAIR HFA/PROVENTIL HFA/VENTOLIN HFA) 108 (90 Base) MCG/ACT inhaler Inhale 2 puffs into the lungs every 6 hours as needed for shortness of breath / dyspnea or wheezing  Yes Unknown, Entered By History   aspirin (ASA) 81 MG EC tablet Take 1 tablet (81 mg) by mouth daily 4/5/2021 at am Yes Marques Cuevas MD, MD   atorvastatin (LIPITOR) 40 MG tablet Take 1 tablet (40 mg) by mouth every evening 4/5/2021 at pm  Yes Marques Cuevas MD, MD   fluticasone (FLONASE) 50 MCG/ACT nasal spray Spray 1 spray into both nostrils daily as needed for rhinitis or allergies 4/5/2021 Yes Unknown, Entered By History   metoprolol tartrate (LOPRESSOR) 25 MG tablet Take 1 tablet (25 mg) by mouth 2 times daily 4/5/2021 at am Yes Marques Cuevas MD, MD   nitroGLYcerin (NITROSTAT) 0.4 MG sublingual tablet For chest pain place 1 tablet under the tongue every 5 minutes for 3 doses. If symptoms persist 5 minutes after 2nd dose call 911. 4/5/2021 Yes Cameron Thurston MD   ticagrelor (BRILINTA) 90 MG tablet Take 1 tablet (90 mg) by  mouth every 12 hours 4/5/2021 at 11 :45 pm  Yes Marques Cuevas MD, MD

## 2021-04-06 NOTE — ED PROVIDER NOTES
History   Chief Complaint:  Chest Pain       The history is provided by the patient.      Ramila Dietrich is a 61 year old female with history of hypertension who presents with chest pain. The patient was recently admitted from 3/24-3/27 with chest pain with concern for unstable angina. She had an angiogram and 2 stents placed in the RCA.  Since discharge, she reports compliance with Brillinta. Here, she reports this evening around 2300 while watching a movie she had chest pressure that radiates down her left arm as well as to her right shoulder. At this time she also felt shaky. She took two nitroglycerin which improved her pain. Currently the chest pressure does not feel as heavy and it did earlier, she rates it 5/10, and she still has achyness in her left arm. She states this pain is similar to what she experienced when she was just admitted. She did not take her blood pressure pill tonight because her blood pressure was low, which she normally takes twice daily. She also takes Brilinta which she has been taking as prescribed, but wonders if this may be contributing to some leg achiness / gland swelling under her neck. She also takes one aspirin daily. She denies leg swelling, nausea, or diaphoresis.      3/24/21 CT Chest PE w Contrast  IMPRESSION:  1.  No evidence for pulmonary embolism.   2.  Moderate emphysema.  3.  Mild groundglass density and interstitial edema in the lower lungs. Please correlate clinically for infection versus CHF.    3/25/21 Echocardiogram  Interpretation Summary  The visual ejection fraction is estimated at 55-60%.  No regional wall motion abnormalities noted.  Normal transthoracic echocardiogram.    3/26/21 Cardiac Catheterization  Left Main  The vessel was visualized by selective angiography and is moderate in size. There was 0% vessel disease.  Left Anterior Descending  Prox LAD lesion is 20% stenosed.  Mid LAD lesion is 50% stenosed. iFR of this lesion was 0.94 indicating it to be  non-flow limiting  Dist LAD lesion is 30% stenosed.  Left Circumflex  Mid Cx lesion is 15% stenosed.  Right Coronary Artery  Prox RCA lesion is 70% stenosed. The lesion is serial.  Dist RCA lesion is 80% stenosed. The lesion is discrete.    Review of Systems   Constitutional: Negative for diaphoresis.   Cardiovascular: Positive for chest pain. Negative for leg swelling.   Gastrointestinal: Negative for nausea.   Musculoskeletal: Positive for back pain and myalgias.   All other systems reviewed and are negative.     Allergies:  Erythromycin  Aminophylline    Medications:  Albuterol inhaler  Aspirin  Lipitor  Lopressor  Nitrostat  Brilinta    Past Medical History:    Acid reflux  Asthma  Hiatal hernia   Gastroenteritis     Past Surgical History:    Appendectomy   section x5  Coronary angiogram  CV instantaneous wave free ratio  Orthopedic surgery  Tonsillectomy    Family History:    Father: lung cancer  Mother: heart disease  Sister: lung issue    Social History:  The patient presents with her boyfriend.  Tobacco use: positive    Physical Exam     Patient Vitals for the past 24 hrs:   BP Temp Temp src Pulse Resp SpO2   21 0545 101/50 -- -- 74 25 98 %   21 0530 114/58 -- -- 82 26 97 %   21 0515 123/71 -- -- 74 16 100 %   21 0500 115/63 -- -- 80 19 97 %   21 0445 119/61 -- -- 84 19 99 %   21 0430 124/69 -- -- 76 27 99 %   21 0415 124/67 -- -- 76 21 100 %   21 0345 125/68 -- -- 76 -- 100 %   21 0330 -- -- -- -- -- 100 %   21 0321 -- -- -- -- -- 100 %   21 0311 (!) 144/81 98.3  F (36.8  C) Oral 85 20 99 %       Physical Exam  General:              Well-nourished              Speaking in full sentences  Eyes:              Conjunctiva without injection or scleral icterus  ENT:              Moist mucous membranes              Nares patent              Pinnae normal  Neck:              Full ROM              No stiffness appreciated  Resp:               Lungs CTAB              No crackles, wheezing or audible rubs              Good air movement  CV:                    Normal rate, regular rhythm              S1 and S2 present              No murmur, gallop or rub  GI:              BS present              Abdomen soft without distention              Non-tender to light and deep palpation              No guarding or rebound tenderness  Skin:              Warm, dry, well perfused              No rashes or open wounds on exposed skin  MSK:              Moves all extremities              No focal deformities or swelling  Neuro:              Alert              Answers questions appropriately              Moves all extremities equally              Gait stable  Psych:              Normal affect, normal mood    Emergency Department Course   ECG  ECG taken at 0316, ECG read at 0323  Normal sinus rhythm. Nonspecific T wave abnormality. Abnormal ECG.   Rate 80 bpm. LA interval 140 ms. QRS duration 80 ms. QT/QTc 406/468 ms. P-R-T axes 17 76 0.     Imaging:  Chest XR, PA and LAT:  IMPRESSION: Negative chest.  Reading per radiology.     Laboratory:   CBC: WBC 11.7 (H), HGB 10.1 (L),     BMP: WNL (Creatinine 0.72)      Troponin (Collected 0330): <0.015      Asymptomatic COVID-19 Virus by PCR: Negative    Emergency Department Course:    Reviewed:  I reviewed nursing notes, vitals and past medical history    Assessments:  0329 I obtained history and examined the patient as noted above.   0412 I rechecked the patient and explained findings.   0444 I rechecked and updated the patient.  0543 I rechecked the patient. Her pain has improved.    Interventions:  0348 Aspirin 162 mg tablet PO  0415 Nitrostat 0.4 mg sublingual tablet  0515 Nitrostat 0.4 mg sublingual tablet     Disposition:  Patient signed out to my colleague, Dr. Flores, pending bed placement.      Impression & Plan     Medical Decision Making:  Ramila Dietrich is a 61-year-old female with a recent diagnosis of CAD s/p  stent placement to the RCA, presenting to the ED for evaluation of chest pain.  VS on presentation reveal elevated BP, which improved during patient's ED course.  DDx includes cardiac ischemia, in-stent restenosis, vasospasm, pulmonary embolism, aortic dissection, pneumonia, pneumothorax, atypical reflux, among others.  Patient presents, reporting that her symptoms feel exactly the same as that which she experienced prior to her previous presentation where she was found to have coronary artery disease requiring stent placement x2, as outlined above.  Her EKG demonstrates sinus rhythm, with T wave inversion in the inferior leads, as well as nonspecific ST-T wave changes in the lateral precordial leads.  These changes are somewhat less pronounced than were noted on her presenting EKG on 3/24.  No acute ST segment elevation is noted warranting emergent activation of the Cath Lab.  Further attempt at symptom control was made with aspirin as well as sublingual nitroglycerin x2.  This resulted in essential resolution of her pain.  Her initial troponin did return negative, though also was obtained shortly after the onset of her symptoms.  Laboratory studies otherwise reveal mild anemia (Hgb 10.1), mild leukocytosis (improved compared with previous), though otherwise are unremarkable.  When compared with previous labs, hemoglobin has dropped over the preceding few weeks.  No current evidence of acute blood loss anemia by history.  Will require further trending.  Other etiologies for chest pain were strongly considered.  I did consider aortic dissection though clinically feel this to be unlikely.  Again her pain has improved with sublingual nitroglycerin.  She is resting comfortably on the gurney, chest x-ray demonstrates no evidence of widened mediastinum, radial pulses are 2+ bilaterally and symmetric, and recent CT imaging did not reveal evidence of acute thoracic aortic abnormality such as aneurysm.  Similarly, I feel  pulmonary embolism to be unlikely as patient is without tachycardia, nor hypoxia.  I do feel CT chest can be deferred safely at this time.  In light of the patient's concerning history, known disease, I do feel she warrants hospitalization for serial troponin testing, and possible cardiology consultation.  Patient will be signed out to my partner of the day shift pending admission.  Her symptoms are well controlled at this time and she is otherwise hemodynamically stable.      Covid-19  Ramila Dietrich was evaluated during a global COVID-19 pandemic, which necessitated consideration that the patient might be at risk for infection with the SARS-CoV-2 virus that causes COVID-19.   Applicable protocols for evaluation were followed during the patient's care.   COVID-19 was considered as part of the patient's evaluation. The plan for testing is:  a test was obtained during this visit.    Diagnosis:    ICD-10-CM    1. Acute chest pain  R07.9    2. Coronary artery disease involving native heart with unstable angina pectoris, unspecified vessel or lesion type (H)  I25.110        Scribe Disclosure:  I, July Whitlock, am serving as a scribe at 3:24 AM on 4/6/2021 to document services personally performed by Mejia Lee MD based on my observations and the provider's statements to me.         Mejia Lee MD  04/06/21 0614

## 2021-04-06 NOTE — ED NOTES
"[facility]  ED Nurse Handoff Report    Ramila Dietrich is a 61 year old female   ED Chief complaint: Chest Pain  . ED Diagnosis:   Final diagnoses:   None     Allergies:   Allergies   Allergen Reactions     Erythromycin      Aminophylline      \"got jittery\"       Code Status: Full Code  Activity level - Baseline/Home:  Independent. Activity Level - Current:   Independent. Lift room needed: No. Bariatric: No   Needed: No   Isolation: No. Infection: Not Applicable.     Vital Signs:   Vitals:    04/06/21 0321 04/06/21 0330 04/06/21 0345 04/06/21 0415   BP:   125/68 124/67   Pulse:   76 76   Resp:    21   Temp:       TempSrc:       SpO2: 100% 100% 100% 100%       Cardiac Rhythm:  ,      Pain level:    Patient confused: No. Patient Falls Risk: Yes.   Elimination Status: Has voided   Patient Report - Initial Complaint: chest pain 2300. Focused Assessment: recent hx of cardiac interventionTests Performed: labs, xrayAbnormal Results:   Labs Ordered and Resulted from Time of ED Arrival Up to the Time of Departure from the ED   CBC WITH PLATELETS DIFFERENTIAL - Abnormal; Notable for the following components:       Result Value    WBC 11.7 (*)     RBC Count 3.23 (*)     Hemoglobin 10.1 (*)     Hematocrit 29.6 (*)     All other components within normal limits   BASIC METABOLIC PANEL   TROPONIN I   SARS-COV-2 (COVID-19) VIRUS RT-PCR   .   Treatments provided: noneFamily Comments:  here  OBS brochure/video discussed/provided to patient:  Yes  ED Medications:   Medications   nitroGLYcerin (NITROSTAT) sublingual tablet 0.4 mg (0.4 mg Sublingual Not Given 4/6/21 8658)   aspirin (ASA) chewable tablet 162 mg (162 mg Oral Given 4/6/21 8782)     Drips infusing:  No  For the majority of the shift, the patient's behavior Green. Interventions performed were none.    Sepsis treatment initiated: No     Patient tested for COVID 19 prior to admission: YES    ED Nurse Name/Phone Number: Yana Phillips RN,   4:23 " AM    RECEIVING UNIT ED HANDOFF REVIEW    Above ED Nurse Handoff Report was reviewed: Yes  Reviewed by: Millie Patel RN on April 6, 2021 at 8:25 AM

## 2021-04-06 NOTE — ED TRIAGE NOTES
Pt states chest pain and dyspnea tonight. Pt had emergent cardiac angiogram within past 14 days. Pt states had 2 stents placed during angiogram. ABCs intact GCS 15

## 2021-04-06 NOTE — DISCHARGE SUMMARY
[facility]  Discharge Summary  Hospitalist    Date of Admission:  4/6/2021  Date of Discharge:  4/6/2021    Discharging Provider: Ronna Barr PAC    Discharge Diagnoses   1. Noncardiac chest pain, possibly related to GERD.  Cardiac cath without in-stent stenosis or new flow limiting lesions.  2. History of ASCVD with recent stenting of the RCA.  Transition to ASA + Plavix (rather than Brilinta) for DAPT  3. Tobacco use  4. GERD.  Discharging with script for Pepcid.    Discharge Disposition     Discharge home    Condition at Discharge: Stable    History of Present Illness   Ramila Dietrich is a 61 year old female with mild asthma, GERD, tobacco use, and recent hospitalization for possible unstable angina dominating in PCI x2 to RCA who presented to [facility] ED on 4/6/2021 with chest pain.  Trops cycled negative x2.  EKG unchanged.  Discussed with Cardiology who recommended initiation of a heparin gtt and bringing patient in for cardiac cath.    Patient taken down for LHC which ultimately showed no in-stent stenosis or new flow limiting lesions.  Chest pain felt to be noncardiac, possibly related to GERD.  Patient cleared by Cardiology to discharge home on DAPT (now with ASA and Plavix rather than Brilinta due to patient preference), metoprolol, ASA 81, and atorvastatin.  Patient has been provided a script for Pepcid to help with GERD.  She should follow-up with PMD and also with Cardiology.        Ronna Barr, PAC    Code Status   Full       Primary Care Physician   Anne Randle    Consultations This Hospital Stay   PHARMACY IP CONSULT  PHARMACY IP CONSULT  CARDIOLOGY IP CONSULT  PHARMACY IP CONSULT  PHARMACY IP CONSULT  SMOKING CESSATION PROGRAM IP CONSULT    Time Spent on this Encounter   I, ALTA Joe, personally saw the patient today and spent greater than 30 minutes discharging this patient.    Allergies   Allergies   Allergen Reactions     Erythromycin       "Aminophylline      \"got jittery\"         Discharge Medications   Current Discharge Medication List      START taking these medications    Details   clopidogrel (PLAVIX) 75 MG tablet Take 1 tablet (75 mg) by mouth daily (Take in place of Brilinta)  Qty: 90 tablet, Refills: 1    Associated Diagnoses: Other chest pain      famotidine (PEPCID) 20 MG tablet Take 1 tablet (20 mg) by mouth 2 times daily as needed (heartburn, chest discomfort)  Qty: 60 tablet, Refills: 1    Associated Diagnoses: Other chest pain; Gastroenteritis         CONTINUE these medications which have NOT CHANGED    Details   albuterol (PROAIR HFA/PROVENTIL HFA/VENTOLIN HFA) 108 (90 Base) MCG/ACT inhaler Inhale 2 puffs into the lungs every 6 hours as needed for shortness of breath / dyspnea or wheezing    Comments: Pharmacy may dispense brand covered by insurance (Proair, or proventil or ventolin or generic albuterol inhaler)      aspirin (ASA) 81 MG EC tablet Take 1 tablet (81 mg) by mouth daily  Qty: 30 tablet, Refills: 0    Associated Diagnoses: Unstable angina (H)      atorvastatin (LIPITOR) 40 MG tablet Take 1 tablet (40 mg) by mouth every evening  Qty: 30 tablet, Refills: 0    Associated Diagnoses: Unstable angina (H)      fluticasone (FLONASE) 50 MCG/ACT nasal spray Spray 1 spray into both nostrils daily as needed for rhinitis or allergies      metoprolol tartrate (LOPRESSOR) 25 MG tablet Take 1 tablet (25 mg) by mouth 2 times daily  Qty: 60 tablet, Refills: 0    Associated Diagnoses: Unstable angina (H)      nitroGLYcerin (NITROSTAT) 0.4 MG sublingual tablet For chest pain place 1 tablet under the tongue every 5 minutes for 3 doses. If symptoms persist 5 minutes after 2nd dose call 911.  Qty: 30 tablet, Refills: 0    Associated Diagnoses: CAD (coronary artery disease)         STOP taking these medications       ticagrelor (BRILINTA) 90 MG tablet Comments:   Reason for Stopping:               Data   Most Recent 3 CBC's:  Recent Labs   Lab Test " 04/06/21  0330 03/27/21  0603 03/25/21  0501   WBC 11.7* 12.5* 9.8   HGB 10.1* 11.7 12.7   MCV 92 93 92    240 334      Most Recent 3 BMP's:  Recent Labs   Lab Test 04/06/21  0330 03/27/21  0603 03/26/21  0554 03/25/21  0501    140  --  137   POTASSIUM 3.6 3.7 4.4 3.9   CHLORIDE 105 109  --  107   CO2 24 24  --  25   BUN 12 14  --  15   CR 0.72 0.71  --  0.68   ANIONGAP 7 7  --  5   REJI 8.8 8.6  --  8.7   GLC 87 83  --  88     Most Recent 2 LFT's:  Recent Labs   Lab Test 01/08/21  2316 10/20/16  1135   AST 27 26   ALT 16 22   ALKPHOS 128 120   BILITOTAL 0.3 0.3     Most Recent INR's and Anticoagulation Dosing History:  Anticoagulation Dose History     Recent Dosing and Labs Latest Ref Rng & Units 10/25/2014    INR 0.86 - 1.14 0.93        Most Recent 3 Troponin's:  Recent Labs   Lab Test 04/06/21  1337 04/06/21  1039 04/06/21  0659   TROPI <0.015 <0.015 <0.015     Most Recent Cholesterol Panel:  Recent Labs   Lab Test 03/26/21  1610   CHOL 206*   *   HDL 55   TRIG 83     Most Recent 6 Bacteria Isolates From Any Culture (See EPIC Reports for Culture Details):  Recent Labs   Lab Test 10/26/14  1849   CULT No Salmonella, Shigella, Campylobacter, E. coli O157, Aeromonas, or Plesiomonas   isolated.       Most Recent TSH, T4 and A1c Labs:  Recent Labs   Lab Test 03/24/21  1506   A1C 5.2     Results for orders placed or performed during the hospital encounter of 04/06/21   Chest XR,  PA & LAT    Narrative    EXAM: XR CHEST 2 VW  LOCATION: [facility]  DATE/TIME: 4/6/2021 3:54 AM    INDICATION: Chest pain.  COMPARISON: 03/24/2021.      Impression    IMPRESSION: Negative chest.   Cardiac Catheterization    Narrative    1. Widely patent RCA stents and no obstructive disease elsewhere        Discharge Orders      Reason for your hospital stay    You were brought in for evaluation of chest pain.  Thankfully, your cardiac cath showed no new or concerning lesions.  You will be discharging home on  Plavix (instead of Brilinta) to see if this helps with your leg spasms.  The chest discomfort you had may be related to heart burn, which can be VERY painful.  You will also be going home with a prescription for famotidine to use 1-2 times per day for heartburn.  Please continue to follow closely with your Primary Provider and with your Cardiology team.

## 2021-04-06 NOTE — H&P
[facility]  History and Physical  Hospitalist       Date of Admission:  4/6/2021    Assessment & Plan   Ramila Dietrich is a 61 year old female with mild asthma, GERD, tobacco use, and recent hospitalization for possible unstable angina dominating in PCI x2 to RCA who presented to [facility] ED on 4/6/2021 with chest pain.     Chest pain, possible unstable angina    ASCVD s/p AKASH to RCA x2 on 3/26  Discussed with Cardiology and given that patient has atypical chest pain and traditionally has had negative troponins, plan will actually be to take her directly to cath.  Patient reports compliance with Brilinta although she feels that this may be causing some lower extremity cramping.  Troponins negative x2.  Started on heparin drip at recommendation of cardiology.  Await results of cardiac cath later today.  -Pending results of angiogram, will continue Lipitor, metoprolol, aspirin, and Plavix (switched by cardiology).    Dyslipidemia  Continue PTA Lipitor    Mild asthma  Continue as needed albuterol inhaler    GERD  Pepcid available    Tobacco use  Nicotine lozenge available     COVID status:  Negative  DVT Prophylaxis: Hep gtt  Code Status: Full Code     Disposition: Expected discharge in 1-2 days pending cath results    Ronna Barr, PAC  Hospitalist Service  [facility]  Text Page (7AM - 5PM, M-F)      PRIMARY CARE PROVIDER: Anne Randle    CHIEF COMPLAINT  Chest pain    History is obtained from the patient    HISTORY OF PRESENT ILLNESS  Ramila Dietrich is a 61 year old female with mild asthma, GERD, tobacco use, and recent hospitalization for possible unstable angina dominating in PCI x2 to RCA who presented to [facility] ED on 4/6/2021 with chest pain.  Ramila was recently hospitalized for concerns of unstable angina where troponin cycled undetectable and echocardiogram actually looked okay.  She was ultimately taken to cardiac cath where she underwent stenting of the RCA  with AKASH x2.  She was discharged home on Brilinta, aspirin, Toprol, and atorvastatin.  She reports compliance with these medications.  Unfortunately, last evening around 11 PM, she developed severe central chest pressure radiating to the back.  Pain was much more severe than anything she has experienced previously.  No associated nausea, diaphoresis, or referral of pain down the arm.  Patient took several nitroglycerin at home and ultimately had her  bring her in for further evaluation.    In the ER, /81, HR 85, RR 20, SPO2 99% rest on room air, temperature 98.3.  Patient receives several more doses of sublingual nitroglycerin typically achieving complete resolution of chest pain.  Given her cardiac history and concerns for unstable angina, admission requested.  Discussed with cardiology team who noted that patient's presentation has historically been atypical and thus planned to take directly to cardiac cath.  They also recommended initiating a heparin drip.    She was seen on the floor where present time she is resting comfortably.  She remains chest pain-free.  She has been bothered by some cramping in her legs that she associates with Brilinta.  She would like to transition to something else.  She otherwise has been doing quite well following her recent cardiac stenting.      PAST MEDICAL HISTORY  1. ASCVD s/p AKASH to RCA x2, 3/26/2021  2. Dyslipidemia  3. Mild asthma  4. Tobacco use disorder  5. GERD    PAST SURGICAL HISTORY  I have reviewed this patient's surgical history and updated it with pertinent information if needed.  Past Surgical History:   Procedure Laterality Date     APPENDECTOMY        SECTION      x5     CV CORONARY ANGIOGRAM N/A 3/26/2021    Procedure: Coronary Angiogram;  Surgeon: Madi Dye MD;  Location: [facility] CARDIAC CATH LAB     CV INSTANTANEOUS WAVE-FREE RATIO N/A 3/26/2021    Procedure: Instantaneous Wave-Free Ratio;  Surgeon: Madi Dye MD;  Location:  " HEART CARDIAC CATH LAB     CV PCI STENT DRUG ELUTING N/A 3/26/2021    Procedure: Percutaneous Coronary Intervention Stent Drug Eluting;  Surgeon: Madi Dye MD;  Location:  HEART CARDIAC CATH LAB     ORTHOPEDIC SURGERY       TONSILLECTOMY         PRIOR TO ADMISSION MEDICATIONS  Prior to Admission Medications   Prescriptions Last Dose Informant Patient Reported? Taking?   albuterol (PROAIR HFA/PROVENTIL HFA/VENTOLIN HFA) 108 (90 Base) MCG/ACT inhaler   Yes Yes   Sig: Inhale 2 puffs into the lungs every 6 hours as needed for shortness of breath / dyspnea or wheezing   aspirin (ASA) 81 MG EC tablet 2021 at am  No Yes   Sig: Take 1 tablet (81 mg) by mouth daily   atorvastatin (LIPITOR) 40 MG tablet 2021 at pm   No Yes   Sig: Take 1 tablet (40 mg) by mouth every evening   fluticasone (FLONASE) 50 MCG/ACT nasal spray 2021  Yes Yes   Sig: Spray 1 spray into both nostrils daily as needed for rhinitis or allergies   metoprolol tartrate (LOPRESSOR) 25 MG tablet 2021 at am  No Yes   Sig: Take 1 tablet (25 mg) by mouth 2 times daily   nitroGLYcerin (NITROSTAT) 0.4 MG sublingual tablet 2021  No Yes   Sig: For chest pain place 1 tablet under the tongue every 5 minutes for 3 doses. If symptoms persist 5 minutes after 2nd dose call 911.   ticagrelor (BRILINTA) 90 MG tablet 2021 at 11 :45 pm   No Yes   Sig: Take 1 tablet (90 mg) by mouth every 12 hours      Facility-Administered Medications: None       ALLERGIES  Allergies   Allergen Reactions     Erythromycin      Aminophylline      \"got jittery\"       SOCIAL HISTORY  Patient is .  She uses tobacco.  She also uses alcohol.  She lives at home with her  in [place].  She is typically IADL.    FAMILY HISTORY  I have reviewed this patient's family history and updated it with pertinent information if needed.   Family History   Problem Relation Age of Onset     Heart Disease Mother          75yo MI emphysema smoker     Cancer " Father          87yo lung smoker     Family History Negative Sister 32        lung issue     Family History Negative Son      Family History Negative Son      Family History Negative Son      Family History Negative Daughter      Neurologic Disorder Daughter 23        MS     Cancer Maternal Grandfather          age? throat smoker       REVIEW OF SYSTEMS:  14 point comprehensive ROS undertaken with pertinent positives and negatives as above and otherwise unremarkable.     PHYSICAL EXAM  Temp: 98.3  F (36.8  C) Temp src: Oral BP: 132/65 Pulse: 71   Resp: 16 SpO2: 99 % O2 Device: Nasal cannula Oxygen Delivery: 3 LPM  Vital Signs with Ranges  Temp:  [98.3  F (36.8  C)] 98.3  F (36.8  C)  Pulse:  [71-86] 71  Resp:  [12-27] 16  BP: ()/(50-83) 132/65  SpO2:  [94 %-100 %] 99 %  108 lbs 12.8 oz    GENERAL:  Pleasant, cooperative, alert. Well developed, well nourished.   HEENT: Normocephalic, atraumatic.  Extra occular mm intact.  Sclera clear. PERRL.  Mucous membranes moist.   PULMONARY: Clear to auscultation bilaterally  CARDIAC: Regular rate and rhythm.  No appreciated murmur.    ABDOMEN: Soft, nontender non distended.    MUSCULOSKELETAL:  Moving x 4 spontaneously with CMS intact x4.  Normal bulk and tone.  No LE edema.   NEURO: Alert and oriented x3.  CN II-XII grossly intact and symmetric.  No ataxia or tremor.  Nonfocal.  SKIN:  Warm, pink, dry.    DATA  Data reviewed today:  I personally reviewed the EKG tracing showing NSR.    Recent Labs   Lab 21  1337 21  1039 21  0659 21  0330   WBC  --   --   --  11.7*   HGB  --   --   --  10.1*   MCV  --   --   --  92   PLT  --   --   --  353   NA  --   --   --  136   POTASSIUM  --   --   --  3.6   CHLORIDE  --   --   --  105   CO2  --   --   --  24   BUN  --   --   --  12   CR  --   --   --  0.72   ANIONGAP  --   --   --  7   REJI  --   --   --  8.8   GLC  --   --   --  87   TROPI <0.015 <0.015 <0.015 <0.015       Recent Results (from  the past 24 hour(s))   Chest XR,  PA & LAT    Narrative    EXAM: XR CHEST 2 VW  LOCATION: [facility]  DATE/TIME: 4/6/2021 3:54 AM    INDICATION: Chest pain.  COMPARISON: 03/24/2021.      Impression    IMPRESSION: Negative chest.

## 2021-04-06 NOTE — CONSULTS
Consult Date:  04/06/2021      CARDIOLOGY CONSULTATION      HISTORY OF PRESENT ILLNESS:  Ramila Dietrich, a 61-year-old woman with coronary artery disease (status post recent percutaneous coronary intervention), hypertension, and cigarette smoking history, was evaluated in consultation at request of Dr. Martin for chest discomfort.      I initially saw the patient in consultation on 03/25/2021 when she presented with recurrent episodes of left-sided chest pressure radiating to her arm and shoulder.  The discomfort did not have a clear relationship to activity, but because of the persistent and recurrent nature, she was brought to the [facility] ER.  Her ECG showed small Q-waves in II, III, aVF and T-wave inversion in leads II, III, aVF, and V4 through V6, which were entirely new since her most recent ECG in early January.  A chest CT showed emphysema without aortic dissection or pulmonary embolus.  Her echo showed a possible subtle inferolateral regional wall motion abnormality.  Troponin enzymes were negative.  Because of the recurrent and persistent nature of her discomfort, she underwent diagnostic coronary angiography by Dr. Dye on 08/26/2001.  The left main had no significant narrowing.  The circumflex had mild atheromatous change with no significant narrowing.  The mid LAD had a 50% lesion with a normal iFR.  The right coronary had a 70% proximal and 80% distal narrowings.  The patient underwent implantation of a 2.5 x 15 mm length zotarolimus-eluting [place] stent in the distal right coronary and a 2.75 x 30 mm length zotarolimus-eluting Nathanael stent in the proximal right coronary.  There were excellent angiographic results at both sites with no residual narrowing and brisk CATARINA-3 flow.  The patient was then discharged on aspirin, clopidogrel, beta blockers and atorvastatin.      The patient was free of symptoms until last evening.  She was watching TV at about 11:00 at night and experienced the onset of chest  pressure radiating to her left arm, similar to her previous symptoms.  She tried several nitroglycerins and then came to the emergency room.  Her ECG showed no acute changes.  Troponin enzymes x 2 have been negative.  Chest x-ray showed no new change.  Symptoms finally resolved at about 5:00 this morning.      Cardiology consultation was requested.      The patient continues to report no history of exercise-associated chest discomfort.  She continues to smoke cigarettes, but she is trying to cut down.  She questions whether ticagrelor may be worsening some of her symptoms.  She does report dyspnea after taking the pills and some vague discomfort in the neck and in the legs.      PAST MEDICAL HISTORY:   1.  Status post appendectomy.   2.  Status post 5 C-sections.   3.  History of right shoulder surgery.   4.  Tonsillectomy.   5.  Hypertension.   6.  Longstanding smoking history -- trying to cut back.  Has been smoking about 40 years, at least 1 pack per day.  CT scan showing emphysema.   7.  Coronary artery disease -- status post recent coronary angiogram demonstrating moderate narrowing in LAD with normal iFR.  No significant narrowing in left main or circumflex.  Status post implantation of 2 drug-eluting stents in the right coronary.      ALLERGIES:  ERYTHROMYCIN AND AMINOPHYLLINE.       HABITS:  The patient still smokes cigarettes.  She does not abuse alcohol.      SOCIAL HISTORY:  The patient is single, but has a very supportive domestic partner, who is again with her at her bedside.  She has 5 children, 5 grandchildren.      PHYSICAL EXAMINATION:   GENERAL:  A very pleasant, cooperative 61-year-old woman.   VITAL SIGNS:  Blood pressure was 132/65, heart rate 71.   LUNGS:  Clear to percussion and auscultation.   CARDIOVASCULAR:  Normal S1 with a normal S2.  There is no S3.  There is no murmur, rub or click.  Her pulses are symmetrical in the carotid, radial, brachial, femoral, popliteal, dorsalis pedis and  posterior tibials.   ABDOMEN:  Bowel sounds are present in all 4 quadrants.  Liver percusses to 7 cm.  Spleen is not palpable.  The aorta is not tender or enlarged.   EXTREMITIES:  There is no swelling, cyanosis or clubbing.   NEUROLOGIC:  Cranial nerves II-XII are intact.  Strength equal and symmetrical.  She displays normal insight and judgment.      LABORATORY STUDIES:  ECG shows a sinus rhythm with normal axis and normal intervals.  There are small Q-waves in II, III, aVF and ST-T wave changes in II, III, aVF, and V4 through V6.  The ECG is unchanged since her most recent ECGs.  Troponin level x 2 are negative.  Her chest x-ray shows normal heart size with expanded lung fields consistent with COPD.      MEDICATIONS: IVUFH and aspirin. .  She has not yet been started on atorvastatin 40 daily, ticagrelor 90 b.i.d., or metoprolol.      ASSESSMENT:  This 61-year-old woman with a known history of coronary disease, status post recent percutaneous coronary intervention, presents with recurrent chest discomfort similar to that which she noted prior to her previous intervention.  Although her ECG had demonstrated definite new changes during her last admission, there were no EKG changes or troponin rise during this admission.  It seems unlikely she has stent thrombosis, but because of the recurrent nature of her symptoms, I believe further testing is warranted.  We discussed the options of a stress test versus a diagnostic coronary angiogram, and she vastly preferred the angiogram, which I feel is quite reasonable.      In the meantime, I would resume her guideline-directed medical therapy.  We will plan to discontinue ticagrelor and begin clopidogrel in view of her complaints on ticagrelor.  If her angiogram should show no evidence of restenosis or new lesion, I suspect that her current symptoms are not cardiac related.  The patient should continue efforts to stop smoking cigarettes.      RECOMMENDATIONS:   1.   Discontinue tobacco use.   2.  Change from ticagrelor to clopidogrel.  We will give 300 mg of clopidogrel now, then 75 daily beginning tomorrow.   3.  Diagnostic coronary angiography with possible repeat intervention.  I have explained the risks and benefits of the procedure and she wants to proceed.   4.  Resume metoprolol 25 b.i.d., atorvastatin 40 daily, aspirin 81 daily.      We have appreciated the opportunity to care for your patient, Ramila Dietrich.         DAIA MANE MD             D: 2021   T: 2021   MT: MARY      Name:     RAMILA DIETRICH   MRN:      [id number]        Account:       [id number]   :      1960           Consult Date:  2021      Document: [id number]       cc: Aurelio Martin MD

## 2021-04-07 ENCOUNTER — PATIENT OUTREACH (OUTPATIENT)
Dept: CARE COORDINATION | Facility: CLINIC | Age: 61
End: 2021-04-07

## 2021-04-07 ENCOUNTER — TELEPHONE (OUTPATIENT)
Dept: INTERNAL MEDICINE | Facility: CLINIC | Age: 61
End: 2021-04-07

## 2021-04-07 DIAGNOSIS — Z71.89 OTHER SPECIFIED COUNSELING: ICD-10-CM

## 2021-04-07 NOTE — LETTER
[facility] CARE COORDINATION  [address]  [place] [facility] [address]    April 8, 2021    Ramila Dietrich  [address]   [place] [address]      Dear Ramila,    I am a clinic community health worker who works with Anne Randle MD at [facility]. I have been trying to reach you recently to introduce Clinic Care Coordination and to see if there was anything I could assist you with.  Below is a description of clinic care coordination and how I can further assist you.      The clinic care coordination team is made up of a registered nurse,  and community health worker who understand the health care system. The goal of clinic care coordination is to help you manage your health and improve access to the health care system in the most efficient manner. The team can assist you in meeting your health care goals by providing education, coordinating services, strengthening the communication among your providers and supporting you with any resource needs.    Please feel free to contact me at [phone] with any questions or concerns. We are focused on providing you with the highest-quality healthcare experience possible and that all starts with you.     Sincerely,     Sonja

## 2021-04-07 NOTE — PROGRESS NOTES
Clinic Care Coordination Contact  [facility]/Voicemail       Clinical Data: Care Coordinator Outreach  Outreach attempted x 1.  Left message on patient's voicemail with call back information and requested return call.  Plan:  Care Coordinator will try to reach patient again in 1-2 business days.

## 2021-04-07 NOTE — TELEPHONE ENCOUNTER
IP F/U    Date: 4/6/21  Diagnosis: Acute Chest Pain, Other Chest Pain  Is patient active in care coordination? Yes - Route to Care Coordination (P [id number])  Was patient in TCU? No    Next 5 appointments (look out 90 days)    Apr 12, 2021  1:30 PM  Return Visit with ANGÉLICA Brannon CNP  [facility] ([facility] ) [address] [address] [address] [phone]        CC has already been notified of CC referral.  Will close encounter.

## 2021-04-08 ENCOUNTER — TELEPHONE (OUTPATIENT)
Dept: PHARMACY | Facility: CLINIC | Age: 61
End: 2021-04-08

## 2021-04-08 NOTE — PROGRESS NOTES
Clinic Care Coordination Contact  [facility]/Voicemail       Clinical Data: Care Coordinator Outreach  Outreach attempted x 2.  Left message on patient's voicemail with call back information and requested return call.  Plan: Care Coordinator will send care coordination introduction letter with care coordinator contact information and explanation of care coordination services via mail. Care Coordinator will do no further outreaches at this time.

## 2021-04-12 ENCOUNTER — OFFICE VISIT (OUTPATIENT)
Dept: CARDIOLOGY | Facility: CLINIC | Age: 61
End: 2021-04-12
Attending: INTERNAL MEDICINE
Payer: COMMERCIAL

## 2021-04-12 VITALS
HEIGHT: 62 IN | DIASTOLIC BLOOD PRESSURE: 80 MMHG | SYSTOLIC BLOOD PRESSURE: 150 MMHG | WEIGHT: 100.7 LBS | BODY MASS INDEX: 18.53 KG/M2 | HEART RATE: 68 BPM

## 2021-04-12 DIAGNOSIS — I10 BENIGN ESSENTIAL HYPERTENSION: Primary | ICD-10-CM

## 2021-04-12 DIAGNOSIS — I25.10 CORONARY ARTERY DISEASE INVOLVING NATIVE CORONARY ARTERY OF NATIVE HEART WITHOUT ANGINA PECTORIS: ICD-10-CM

## 2021-04-12 PROCEDURE — 99214 OFFICE O/P EST MOD 30 MIN: CPT | Performed by: NURSE PRACTITIONER

## 2021-04-12 RX ORDER — LISINOPRIL 5 MG/1
5 TABLET ORAL DAILY
Qty: 90 TABLET | Refills: 3 | Status: SHIPPED | OUTPATIENT
Start: 2021-04-12 | End: 2021-05-19

## 2021-04-12 ASSESSMENT — MIFFLIN-ST. JEOR: SCORE: 975.02

## 2021-04-12 NOTE — LETTER
4/12/2021    Anne Randle MD  [address] [facility] [address]    RE: Ramila Dietrich       Dear Colleague,    I had the pleasure of seeing Ramila Dietrich in the [facility].    HISTORY OF PRESENT ILLNESS:    This is a 61 year old female who follows with Dr. Thurston at [facility].  Her past medical history includes: hypertension, coronary artery disease, tobacco dependence    Ms Dietrich was admitted for chest pain and concern for unstable angina (3/24/21)  EKG showed small inferior Q-waves with some inferolateral ST changes. She underwent stenting to both her proximal and distal RCA. A chest CT showed moderate emphysema  Her ECHO showed LVEF 55-60%, normal RV function, and no significant valvular pathology. She was placed on Brilinta, Atorvastatin, and Metoprolol  Her home Amlodipine was discontinued.    She was readmitted (4/6/21) with resting chest pain and ruled out for a MI. Coronary angiography was performed (4/6/21) which showed widely patent RCA stents and no significant obstructive CAD elsewhere.  Her chest pain was felt to likely be related to GERD and she was sent home on Pepcid.  Her Brilinta was changed to Plavix.    Our visit today is for further review.    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  Since her last coronary angiography, she has not had any further chest pain or shortness of breath.  She is sleeping well. She has cut back on smoking and now only smoking 1/2 ppd.  Her energy is good.  She denies palpitations, orthopnea, or peripheral edema.    VITAL SIGNS:  BP: 158/85   150/80  Pulse: 68  Weight: 100 lbs (stable)  BMI: 18  Right radial puncture site without bleeding, hematoma, or bruit    IMPRESSION AND PLAN:    Coronary Artery Disease:  -s/p AKASH to distal RCA and AKASH to proximal RCA (3/24/21)  -repeat cath (4/6/21) done for CP showed patent stents and no other obstructive  CAD  -CP likely related to GERD  -denies further CP  -LVEF 55%  -Plavix for at least 1 yr, ASA 81 mg indefinitely  -considering cardiac rehab    Hypertension:  -on Metoprolol 25 mg BID  -BP elevated  -start Lisinopril 5 mg  -return in 1 month with labs    Hyperlipidemia:  -Atorvastatin 40 mg started last month due to   -repeat lipid panel next month    Tobacco dependence  -has decreased her smoking to 1/2 ppd   -motivated to eventually quit  I encouraged cessation    The total time for the visit today was 30 minutes which includes patient visit, reviewing of records, discussion, and placing of orders of the outpatient coordination of cardiovascular care as described.  The level of medical decision making during this visit was of moderate complexity.  Thank you for allowing me to participate in their care.    Orders Placed This Encounter   Procedures     Basic metabolic panel     Lipid Profile     ALT     Follow-Up with Cardiac Advanced Practice Provider       Orders Placed This Encounter   Medications     lisinopril (ZESTRIL) 5 MG tablet     Sig: Take 1 tablet (5 mg) by mouth daily     Dispense:  90 tablet     Refill:  3       There are no discontinued medications.      Encounter Diagnoses   Name Primary?     Coronary artery disease involving native coronary artery of native heart without angina pectoris      Benign essential hypertension Yes       CURRENT MEDICATIONS:  Current Outpatient Medications   Medication Sig Dispense Refill     albuterol (PROAIR HFA/PROVENTIL HFA/VENTOLIN HFA) 108 (90 Base) MCG/ACT inhaler Inhale 2 puffs into the lungs every 6 hours as needed for shortness of breath / dyspnea or wheezing       aspirin (ASA) 81 MG EC tablet Take 1 tablet (81 mg) by mouth daily 30 tablet 0     atorvastatin (LIPITOR) 40 MG tablet Take 1 tablet (40 mg) by mouth every evening 30 tablet 0     clopidogrel (PLAVIX) 75 MG tablet Take 1 tablet (75 mg) by mouth daily (Take in place of Brilinta) 90 tablet 1      "famotidine (PEPCID) 20 MG tablet Take 1 tablet (20 mg) by mouth 2 times daily as needed (heartburn, chest discomfort) 60 tablet 1     lisinopril (ZESTRIL) 5 MG tablet Take 1 tablet (5 mg) by mouth daily 90 tablet 3     metoprolol tartrate (LOPRESSOR) 25 MG tablet Take 1 tablet (25 mg) by mouth 2 times daily 60 tablet 0     nitroGLYcerin (NITROSTAT) 0.4 MG sublingual tablet For chest pain place 1 tablet under the tongue every 5 minutes for 3 doses. If symptoms persist 5 minutes after 2nd dose call 911. 30 tablet 0     fluticasone (FLONASE) 50 MCG/ACT nasal spray Spray 1 spray into both nostrils daily as needed for rhinitis or allergies         ALLERGIES     Allergies   Allergen Reactions     Erythromycin      Aminophylline      \"got jittery\"       PAST MEDICAL HISTORY:  Past Medical History:   Diagnosis Date     Acid reflux      Asthma      Hiatal hernia        PAST SURGICAL HISTORY:  Past Surgical History:   Procedure Laterality Date     APPENDECTOMY        SECTION      x5     CV CORONARY ANGIOGRAM N/A 3/26/2021    Procedure: Coronary Angiogram;  Surgeon: Madi Dye MD;  Location:  HEART CARDIAC CATH LAB     CV HEART CATHETERIZATION WITH POSSIBLE INTERVENTION N/A 2021    Procedure: coronary angiogram;  Surgeon: Madi Dye MD;  Location:  HEART CARDIAC CATH LAB     CV INSTANTANEOUS WAVE-FREE RATIO N/A 3/26/2021    Procedure: Instantaneous Wave-Free Ratio;  Surgeon: Madi Dye MD;  Location: [facility] CARDIAC CATH LAB     CV PCI STENT DRUG ELUTING N/A 3/26/2021    Procedure: Percutaneous Coronary Intervention Stent Drug Eluting;  Surgeon: Madi Dye MD;  Location:  HEART CARDIAC CATH LAB     ORTHOPEDIC SURGERY       TONSILLECTOMY         FAMILY HISTORY:  Family History   Problem Relation Age of Onset     Heart Disease Mother          75yo [place] emphysema smoker     Cancer Father          85yo lung smoker     Family History Negative Sister 32        lung issue "     Family History Negative Son      Family History Negative Son      Family History Negative Son      Family History Negative Daughter      Neurologic Disorder Daughter 23        MS     Cancer Maternal Grandfather          age? throat smoker       SOCIAL HISTORY:  Social History     Socioeconomic History     Marital status: Single     Spouse name: None     Number of children: 5     Years of education: None     Highest education level: None   Occupational History     Employer: [employer]   Social Needs     Financial resource strain: None     Food insecurity     Worry: None     Inability: None     Transportation needs     Medical: None     Non-medical: None   Tobacco Use     Smoking status: Current Every Day Smoker     Packs/day: 1.00     Years: 40.00     Pack years: 40.00     Smokeless tobacco: Never Used     Tobacco comment: Smoker since age 13.  ppd.   Substance and Sexual Activity     Alcohol use: No     Drug use: No     Sexual activity: Not Currently   Lifestyle     Physical activity     Days per week: None     Minutes per session: None     Stress: None   Relationships     Social connections     Talks on phone: None     Gets together: None     Attends Gnosticist service: None     Active member of club or organization: None     Attends meetings of clubs or organizations: None     Relationship status: None     Intimate partner violence     Fear of current or ex partner: None     Emotionally abused: None     Physically abused: None     Forced sexual activity: None   Other Topics Concern     None   Social History Narrative     None       Review of Systems:  Skin:  Negative       Eyes:  Negative      ENT:  Negative      Respiratory:  Negative       Cardiovascular:  Negative      Gastroenterology: Negative      Genitourinary:  Negative      Musculoskeletal:  Negative      Neurologic:  Negative      Psychiatric:  Negative      Heme/Lymph/Imm:  Negative      Endocrine:  Negative        Physical  "Exam:  Vitals: BP (!) 150/80   Pulse 68   Ht 1.575 m (5' 2\")   Wt 45.7 kg (100 lb 11.2 oz)   LMP  (LMP Unknown)   Breastfeeding No   BMI 18.42 kg/m      Constitutional:  cooperative thin      Skin:  warm and dry to the touch          Head:  normocephalic        Eyes:           Lymph:      ENT:           Neck:  JVP normal;no carotid bruit        Respiratory:  clear to auscultation;normal respiratory excursion         Cardiac: regular rhythm;normal S1 and S2     no presence of murmur          pulses full and equal                                        GI:           Extremities and Muscular Skeletal:  no edema              Neurological:  affect appropriate        Psych:           Thank you for allowing me to participate in the care of your patient.      Sincerely,     ANGÉLICA Nice [facility]    cc:   Cameron Thurston MD  [address]  [place] [address]        "

## 2021-04-12 NOTE — PROGRESS NOTES
Medication Therapy Management (MTM) Encounter    ASSESSMENT:                            Medication Adherence/Access: No issues identified    Hypertension/CAD/AKASH: Stable. Patient is not meeting blood pressure goal of < 130/80mmHg. Continue with plan to buy blood pressure cuff for home monitoring and plan to follow-up with cardiology in 1 month to check blood pressure again since starting lisinopril.    Hyperlipidemia: Patient is on high intensity statin which is indicated based on 2019 ACC/AHA guidelines for lipid management.  Continue with plan to recheck cholesterol at the end of May.    GERD: Stable. Current treatment is effective.    Asthma: Stable    PLAN:                            1. Continue current medication regimen    Follow-up: at patient request    SUBJECTIVE/OBJECTIVE:                          Ramila Dietrich is a 61 year old female called for a transitions of care visit. She was discharged from [facility] on 4/6/21 for chest pain believed to be related to GERD.      Reason for visit: Feels like she is doing good.  Spoke to the pharmacist in the hospital about meds for half an hour and then spoke with cardiologist about meds yesterday - feels like she has a handle on everything.    Allergies/ADRs: Reviewed in chart  Tobacco: She reports that she has been smoking. She has a 40.00 pack-year smoking history. She has never used smokeless tobacco.Tobacco Cessation Action Plan:   Information offered: Patient not interested at this time  She has cut down to half pack a day. Wants to try this on her own first but appreciates the offer for assistance and will reach out if she needs it.  Alcohol: not currently using  Caffeine: 3-4 cups/day of coffee  Activity: Not much lately since laid off. Going back to work on Monday, works at the [employer]. 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    Medication Adherence/Access: Patient takes medications directly from bottles. Each day  sets them up in baggies for the right doses.  Patient takes medications 3 time(s) per day.   Per patient, misses medication 0 times per week.   Medication barriers: none.   The patient fills medications at [place]: NO, fills medications at [facility].     Hypertension/CAD/AKASH: Current medications include lisinopril 5 mg daily (started on 4/12), metoprolol tartrate 25 mg twice daily.  Patient also has SL NTG as needed for chest pain. Has not had to use since out of the hospital.  Patient is also taking aspirin 81 mg daily and clopidogrel 75 mg daily due to recent AKASH placement.  Had a couple bruises but they are going away. No concerns with side effects.   Patient has angiogram and 2 stents place to RCA on 3/25/21. Started on DAPT, originally on Brilinta but switched to clopidogrel on 4/6 due to patient preference. Per cardiology DAPT for 1 year and aspirin indefinitely.  Patient does not self-monitor blood pressure. Patient is really small and none of the blood pressure cuffs in the stores will fit her. Her cardiologist recommended she look on [employer] and she is planning to buy a blood pressure cuff from there.    Patient last saw cardiology on 4/12. Started on lisinopril due to elevated blood pressure. Plan for follow-up in 1 month with labs on 5/19.  BP Readings from Last 3 Encounters:   04/12/21 (!) 150/80   04/06/21 118/64   03/27/21 102/66     Potassium   Date Value Ref Range Status   04/06/2021 3.6 3.4 - 5.3 mmol/L Final     Hyperlipidemia: Current therapy includes atorvastatin 40 mg daily.  Patient reports no significant myalgias or other side effects. Atorvastatin was started during previous hospitalization in mid-March.   Per cardiology on 4/12 plan was for repeat lipid panel 5/19.  The 10-year ASCVD risk score (Yasirsean CEVALLOS Jr., et al., 2013) is: 13.7%    Values used to calculate the score:      Age: 61 years      Sex: Female      Is Non- : No      Diabetic: No      Tobacco smoker:  Yes      Systolic Blood Pressure: 150 mmHg      Is BP treated: Yes      HDL Cholesterol: 55 mg/dL      Total Cholesterol: 206 mg/dL  Recent Labs   Lab Test 03/26/21  1610 03/25/21  0501   CHOL 206* 273*   HDL 55 63   * 189*   TRIG 83 105     GERD: Current medications include: Pepcid (famotidine) 20 mg twice daily. Patient feels that current regimen is effective.  Recent chest pain at hospitalization believed to be associated with GERD. Has not had any heart burn or pain in chest since she started famotidine.    Asthma: Current medications: Short-Acting Bronchodilator: Albuterol MDI (uses maybe twice a year).  Patient reports she has very mild asthma and really doesn't need her inhaler. She might use it two times a year if she breathes in something wrong. Right now she really has no issues or concerns with her breathing and feels her breathing is really good.  ACT Total Scores 10/29/2019   ACT TOTAL SCORE (Goal Greater than or Equal to 20) 25   In the past 12 months, how many times did you visit the emergency room for your asthma without being admitted to the hospital? 0   In the past 12 months, how many times were you hospitalized overnight because of your asthma? 0       Today's Vitals: LMP  (LMP Unknown)   ----------------  Post Discharge Medication Reconciliation Status: discharge medications reconciled and changed, per note/orders.    I spent 21 minutes with this patient today. I offer these suggestions for consideration by Dr. Randle. A copy of the visit note was provided to the patient's primary care provider.    The patient declined a summary of these recommendations.     I concur with the note as dictated above.   Valorie Bautista, PharmD      Tamanna Montaño, PharmD  PGY1 Medication Therapy Management Resident  Voicemail: [phone]        Telemedicine Visit Details  Type of service:  Telephone visit  Start Time: 12:30 PM  End Time: 12:51 PM  Originating Location (patient location): Home  [place]  Location (provider location):  [facility] [place]      Medication Therapy Recommendations  No medication therapy recommendations to display

## 2021-04-13 ENCOUNTER — VIRTUAL VISIT (OUTPATIENT)
Dept: PHARMACY | Facility: CLINIC | Age: 61
End: 2021-04-13
Payer: COMMERCIAL

## 2021-04-13 DIAGNOSIS — I10 BENIGN ESSENTIAL HYPERTENSION: Primary | ICD-10-CM

## 2021-04-13 DIAGNOSIS — J45.20 MILD INTERMITTENT ASTHMA WITHOUT COMPLICATION: ICD-10-CM

## 2021-04-13 DIAGNOSIS — E78.5 HYPERLIPIDEMIA LDL GOAL <100: ICD-10-CM

## 2021-04-13 DIAGNOSIS — Z95.5 HX OF HEART ARTERY STENT: ICD-10-CM

## 2021-04-13 DIAGNOSIS — I25.110 CORONARY ARTERY DISEASE INVOLVING NATIVE HEART WITH UNSTABLE ANGINA PECTORIS, UNSPECIFIED VESSEL OR LESION TYPE (H): ICD-10-CM

## 2021-04-13 DIAGNOSIS — K21.00 GASTROESOPHAGEAL REFLUX DISEASE WITH ESOPHAGITIS WITHOUT HEMORRHAGE: ICD-10-CM

## 2021-04-13 PROCEDURE — 99605 MTMS BY PHARM NP 15 MIN: CPT | Mod: TEL | Performed by: PHARMACIST

## 2021-04-13 NOTE — PATIENT INSTRUCTIONS
Recommendations from today's MTM visit:                                                    MTM (medication therapy management) is a service provided by a clinical pharmacist designed to help you get the most of out of your medicines.   Today we reviewed what your medicines are for, how to know if they are working, that your medicines are safe and how to make your medicine regimen as easy as possible.      1. Continue current medication regimen    Next MTM visit: at patient request    It was great to speak with you today.  I value your experience and would be very thankful for your time with providing feedback on our clinic survey. You may receive a survey via email or text message in the next few days.     To schedule another MTM appointment, please call the clinic directly or you may call the MTM scheduling line at [phone] or toll-free at [phone].     My Clinical Pharmacist's contact information:                                                      Please feel free to contact me with any questions or concerns you have.      Tamanna Montaño, PharmD  PGY1 Medication Therapy Management Resident  Voicemail: [phone]

## 2021-04-26 DIAGNOSIS — I20.0 UNSTABLE ANGINA (H): ICD-10-CM

## 2021-04-26 RX ORDER — METOPROLOL TARTRATE 25 MG/1
25 TABLET, FILM COATED ORAL 2 TIMES DAILY
Qty: 60 TABLET | Refills: 3 | Status: SHIPPED | OUTPATIENT
Start: 2021-04-26 | End: 2021-08-25

## 2021-05-18 NOTE — PROGRESS NOTES
HISTORY OF PRESENT ILLNESS:    This is a 61 year old female who follows with Dr Thurston at [facility].   Her past medical history includes: hypertension, coronary artery disease, COPD, and  tobacco dependence    Ms Dietrich suffered acute coronary syndrome and underwent stenting to her proximal and distal RCA (3/24/21) A chest CT showed moderate emphysema  Her ECHO showed LVEF 55-60%, normal RV function, and no significant valvular pathology. She was placed on Brilinta, Atorvastatin, and Metoprolol  Her home Amlodipine was discontinued.    She was readmitted (4/6/21) with resting chest pain and ruled out for a MI. Coronary angiography was performed (4/6/21) which showed widely patent RCA stents and no significant obstructive CAD elsewhere.  Her chest pain was felt to likely be related to GERD and she was sent home on Pepcid.  Her Brilinta was changed to Plavix.    In follow up, Lisinopril was started to better control her blood pressure.  She returns today for reassessment    Ms Dietrich is very active at her job working at the [employer].  She denies any chest pain or significant shortness of breath.  She denies palpitations or orthopnea.  At times, she notices some mild ankle swelling.  Her energy is good  She does think the Pepcid has helped her heartburn symptoms.        I reviewed labs today  Sodium: 136  Potassium: 4.4  BUN: 14  Creatinine: 0.74  Total Cholesterol: 168  Triglycerides: 132  HDL: 57  LDL: 85      VITAL SIGNS:  BP: 162/96  Pulse: 76  Weight: 106 lbs    IMPRESSION AND PLAN:     Coronary Artery Disease:  -s/p AKASH to distal RCA and KAASH to proximal RCA (3/24/21)  -repeat cath (4/6/21) done for CP showed patent stents and no other obstructive CAD  -CP likely related to GERD  -denies further CP  -LVEF 55%  -Plavix for at least 1 yr, ASA 81 mg indefinitely  -deferred cardiac rehab    Hypertension:  -on Metoprolol 25 mg BID, Lisinopril 5 mg  -BP elevated  -will increase Lisinopril to 20  mg  -return with BMP in 2 months    Hyperlipidemia:  -Atorvastatin 40 mg (baseline )  -LDL  85 today  -will change to Crestor 40 mg and repeat lipid panel in 2 months     Tobacco dependence  -has decreased her smoking to 1/2 ppd   -motivated to eventually quit  I encouraged cessation    The total time for the visit today was 35 minutes which includes patient visit, reviewing of records, discussion, and placing of orders of the outpatient coordination of cardiovascular care as described.  The level of medical decision making during this visit was of moderate complexity.  Thank you for allowing me to participate in their care.    Orders Placed This Encounter   Procedures     Lipid Profile     Basic metabolic panel     Follow-Up with Cardiologist       Orders Placed This Encounter   Medications     diphenhydrAMINE (BENADRYL) 25 MG tablet     Sig: Take 25 mg by mouth every 6 hours as needed for itching or allergies     lisinopril (ZESTRIL) 20 MG tablet     Sig: Take 1 tablet (20 mg) by mouth daily     Dispense:  90 tablet     Refill:  3     rosuvastatin (CRESTOR) 40 MG tablet     Sig: Take 1 tablet (40 mg) by mouth daily     Dispense:  90 tablet     Refill:  3       Medications Discontinued During This Encounter   Medication Reason     lisinopril (ZESTRIL) 5 MG tablet      atorvastatin (LIPITOR) 40 MG tablet          Encounter Diagnosis   Name Primary?     Coronary artery disease involving native coronary artery of native heart without angina pectoris        CURRENT MEDICATIONS:  Current Outpatient Medications   Medication Sig Dispense Refill     albuterol (PROAIR HFA/PROVENTIL HFA/VENTOLIN HFA) 108 (90 Base) MCG/ACT inhaler Inhale 2 puffs into the lungs every 6 hours as needed for shortness of breath / dyspnea or wheezing       aspirin (ASA) 81 MG EC tablet Take 1 tablet (81 mg) by mouth daily 90 tablet 3     clopidogrel (PLAVIX) 75 MG tablet Take 1 tablet (75 mg) by mouth daily (Take in place of Brilinta) 90  "tablet 1     diphenhydrAMINE (BENADRYL) 25 MG tablet Take 25 mg by mouth every 6 hours as needed for itching or allergies       famotidine (PEPCID) 20 MG tablet Take 1 tablet (20 mg) by mouth 2 times daily as needed (heartburn, chest discomfort) 60 tablet 1     lisinopril (ZESTRIL) 20 MG tablet Take 1 tablet (20 mg) by mouth daily 90 tablet 3     metoprolol tartrate (LOPRESSOR) 25 MG tablet Take 1 tablet (25 mg) by mouth 2 times daily 60 tablet 3     nitroGLYcerin (NITROSTAT) 0.4 MG sublingual tablet For chest pain place 1 tablet under the tongue every 5 minutes for 3 doses. If symptoms persist 5 minutes after 2nd dose call 911. 30 tablet 0     rosuvastatin (CRESTOR) 40 MG tablet Take 1 tablet (40 mg) by mouth daily 90 tablet 3       ALLERGIES     Allergies   Allergen Reactions     Erythromycin      Aminophylline      \"got jittery\"       PAST MEDICAL HISTORY:  Past Medical History:   Diagnosis Date     Acid reflux      Asthma      Hiatal hernia        PAST SURGICAL HISTORY:  Past Surgical History:   Procedure Laterality Date     APPENDECTOMY        SECTION      x5     CV CORONARY ANGIOGRAM N/A 3/26/2021    Procedure: Coronary Angiogram;  Surgeon: Madi Dye MD;  Location:  HEART CARDIAC CATH LAB     CV HEART CATHETERIZATION WITH POSSIBLE INTERVENTION N/A 2021    Procedure: coronary angiogram;  Surgeon: Madi Dye MD;  Location:  HE[facility]     CV INSTANTANEOUS WAVE-FREE RATIO N/A 3/26/2021    Procedure: Instantaneous Wave-Free Ratio;  Surgeon: Madi Dye MD;  Location:  HEART [facility] C[facility] LAB     CV PCI STENT DRUG ELUTING N/A 3/26/2021    Procedure: Percutaneous Coronary Intervention Stent Drug Eluting;  Surgeon: Madi Dye MD;  Location:  HEART CARDIAC CATH LAB     ORTHOPEDIC SURGERY       TONSILLECTOMY         FAMILY HISTORY:  Family History   Problem Relation Age of Onset     Heart Disease Mother          73yo [place] emphysema smoker     Cancer " Father          85yo lung smoker     Family History Negative Sister 32        lung issue     Family History Negative Son      Family History Negative Son      Family History Negative Son      Family History Negative Daughter      Neurologic Disorder Daughter 23        MS     Cancer Maternal Grandfather          age? throat smoker       SOCIAL HISTORY:  Social History     Socioeconomic History     Marital status: Single     Spouse name: None     Number of children: 5     Years of education: None     Highest education level: None   Occupational History     Employer: [employer]   Social Needs     Financial resource strain: None     Food insecurity     Worry: None     Inability: None     Transportation needs     Medical: None     Non-medical: None   Tobacco Use     Smoking status: Current Every Day Smoker     Packs/day: 0.50     Years: 40.00     Pack years: 20.00     Smokeless tobacco: Never Used     Tobacco comment: Smoker since age 13. 1/2-3/4 ppd.   Substance and Sexual Activity     Alcohol use: No     Drug use: No     Sexual activity: Not Currently   Lifestyle     Physical activity     Days per week: None     Minutes per session: None     Stress: None   Relationships     Social connections     Talks on phone: None     Gets together: None     Attends Shinto service: None     Active member of club or organization: None     Attends meetings of clubs or organizations: None     Relationship status: None     Intimate partner violence     Fear of current or ex partner: None     Emotionally abused: None     Physically abused: None     Forced sexual activity: None   Other Topics Concern     None   Social History Narrative     None       Review of Systems:  Skin:  Positive for bruising     Eyes:  Negative      ENT:  Negative      Respiratory:  Negative       Cardiovascular:    Positive for;edema occ of the ankles  Gastroenterology: Negative      Genitourinary:  Negative      Musculoskeletal:  Negative     "  Neurologic:  Negative      Psychiatric:  Positive for sleep disturbances    Heme/Lymph/Imm:  Positive for allergies RX  Endocrine:  Negative        Physical Exam:  Vitals: BP (!) 162/96   Pulse 76   Ht 1.575 m (5' 2\")   Wt 48.1 kg (106 lb 1.6 oz)   LMP  (LMP Unknown)   BMI 19.41 kg/m      Constitutional:  cooperative thin      Skin:  warm and dry to the touch          Head:  normocephalic        Eyes:           Lymph:      ENT:           Neck:  JVP normal;no carotid bruit        Respiratory:  clear to auscultation;normal respiratory excursion         Cardiac: regular rhythm;normal S1 and S2     no presence of murmur          pulses full and equal                                        GI:           Extremities and Muscular Skeletal:  no edema              Neurological:  affect appropriate        Psych:  Alert and Oriented x 3          CC  Candelaria Garza, APRN CNP  [id number]  [place] [id number]                  "

## 2021-05-19 ENCOUNTER — OFFICE VISIT (OUTPATIENT)
Dept: CARDIOLOGY | Facility: CLINIC | Age: 61
End: 2021-05-19
Attending: NURSE PRACTITIONER
Payer: COMMERCIAL

## 2021-05-19 VITALS
WEIGHT: 106.1 LBS | DIASTOLIC BLOOD PRESSURE: 96 MMHG | HEART RATE: 76 BPM | SYSTOLIC BLOOD PRESSURE: 162 MMHG | HEIGHT: 62 IN | BODY MASS INDEX: 19.53 KG/M2

## 2021-05-19 DIAGNOSIS — I25.10 CORONARY ARTERY DISEASE INVOLVING NATIVE CORONARY ARTERY OF NATIVE HEART WITHOUT ANGINA PECTORIS: ICD-10-CM

## 2021-05-19 LAB
ALT SERPL W P-5'-P-CCNC: 19 U/L (ref 0–50)
ANION GAP SERPL CALCULATED.3IONS-SCNC: 4 MMOL/L (ref 3–14)
BUN SERPL-MCNC: 14 MG/DL (ref 7–30)
CALCIUM SERPL-MCNC: 8.8 MG/DL (ref 8.5–10.1)
CHLORIDE SERPL-SCNC: 106 MMOL/L (ref 94–109)
CHOLEST SERPL-MCNC: 168 MG/DL
CO2 SERPL-SCNC: 26 MMOL/L (ref 20–32)
CREAT SERPL-MCNC: 0.74 MG/DL (ref 0.52–1.04)
GFR SERPL CREATININE-BSD FRML MDRD: 88 ML/MIN/{1.73_M2}
GLUCOSE SERPL-MCNC: 89 MG/DL (ref 70–99)
HDLC SERPL-MCNC: 57 MG/DL
LDLC SERPL CALC-MCNC: 85 MG/DL
NONHDLC SERPL-MCNC: 111 MG/DL
POTASSIUM SERPL-SCNC: 4.4 MMOL/L (ref 3.4–5.3)
SODIUM SERPL-SCNC: 136 MMOL/L (ref 133–144)
TRIGL SERPL-MCNC: 132 MG/DL

## 2021-05-19 PROCEDURE — 99214 OFFICE O/P EST MOD 30 MIN: CPT | Performed by: NURSE PRACTITIONER

## 2021-05-19 PROCEDURE — 84460 ALANINE AMINO (ALT) (SGPT): CPT | Performed by: NURSE PRACTITIONER

## 2021-05-19 PROCEDURE — 36415 COLL VENOUS BLD VENIPUNCTURE: CPT | Performed by: NURSE PRACTITIONER

## 2021-05-19 PROCEDURE — 80048 BASIC METABOLIC PNL TOTAL CA: CPT | Performed by: NURSE PRACTITIONER

## 2021-05-19 PROCEDURE — 80061 LIPID PANEL: CPT | Performed by: NURSE PRACTITIONER

## 2021-05-19 RX ORDER — DIPHENHYDRAMINE HCL 25 MG
25 TABLET ORAL EVERY 6 HOURS PRN
COMMUNITY
End: 2021-10-26

## 2021-05-19 RX ORDER — LISINOPRIL 20 MG/1
20 TABLET ORAL DAILY
Qty: 90 TABLET | Refills: 3 | Status: SHIPPED | OUTPATIENT
Start: 2021-05-19 | End: 2021-06-23

## 2021-05-19 RX ORDER — ROSUVASTATIN CALCIUM 40 MG/1
40 TABLET, COATED ORAL DAILY
Qty: 90 TABLET | Refills: 3 | Status: SHIPPED | OUTPATIENT
Start: 2021-05-19 | End: 2022-05-31

## 2021-05-19 ASSESSMENT — MIFFLIN-ST. JEOR: SCORE: 999.52

## 2021-05-19 NOTE — PATIENT INSTRUCTIONS
Increase your Lisinopril to 20 mg/day (new prescription)  Stop your Atorvastatin when done with the bottle and then start new cholesterol pill Crestor 40 mg/day    Return in 2 months with labs

## 2021-05-19 NOTE — LETTER
5/19/2021    Anne Randle MD  [address] [facility] [address]    RE: Ramila Dietrich       Dear Colleague,    I had the pleasure of seeing Ramila Dietrich in the [facility].    HISTORY OF PRESENT ILLNESS:    This is a 61 year old female who follows with Dr Thurston at [facility].   Her past medical history includes: hypertension, coronary artery disease, COPD, and  tobacco dependence    Ms Dietrich suffered acute coronary syndrome and underwent stenting to her proximal and distal RCA (3/24/21) A chest CT showed moderate emphysema  Her ECHO showed LVEF 55-60%, normal RV function, and no significant valvular pathology. She was placed on Brilinta, Atorvastatin, and Metoprolol  Her home Amlodipine was discontinued.    She was readmitted (4/6/21) with resting chest pain and ruled out for a MI. Coronary angiography was performed (4/6/21) which showed widely patent RCA stents and no significant obstructive CAD elsewhere.  Her chest pain was felt to likely be related to GERD and she was sent home on Pepcid.  Her Brilinta was changed to Plavix.    In follow up, Lisinopril was started to better control her blood pressure.  She returns today for reassessment    Ms Dietrich is very active at her job working at the [employer].  She denies any chest pain or significant shortness of breath.  She denies palpitations or orthopnea.  At times, she notices some mild ankle swelling.  Her energy is good  She does think the Pepcid has helped her heartburn symptoms.        I reviewed labs today  Sodium: 136  Potassium: 4.4  BUN: 14  Creatinine: 0.74  Total Cholesterol: 168  Triglycerides: 132  HDL: 57  LDL: 85      VITAL SIGNS:  BP: 162/96  Pulse: 76  Weight: 106 lbs    IMPRESSION AND PLAN:     Coronary Artery Disease:  -s/p AKASH to distal RCA and AKASH to proximal RCA (3/24/21)  -repeat cath (4/6/21) done for CP showed patent stents and no other obstructive  CAD  -CP likely related to GERD  -denies further CP  -LVEF 55%  -Plavix for at least 1 yr, ASA 81 mg indefinitely  -deferred cardiac rehab    Hypertension:  -on Metoprolol 25 mg BID, Lisinopril 5 mg  -BP elevated  -will increase Lisinopril to 20 mg  -return with BMP in 2 months    Hyperlipidemia:  -Atorvastatin 40 mg (baseline )  -LDL  85 today  -will change to Crestor 40 mg and repeat lipid panel in 2 months     Tobacco dependence  -has decreased her smoking to 1/2 ppd   -motivated to eventually quit  I encouraged cessation    The total time for the visit today was 35 minutes which includes patient visit, reviewing of records, discussion, and placing of orders of the outpatient coordination of cardiovascular care as described.  The level of medical decision making during this visit was of moderate complexity.  Thank you for allowing me to participate in their care.    Orders Placed This Encounter   Procedures     Lipid Profile     Basic metabolic panel     Follow-Up with Cardiologist       Orders Placed This Encounter   Medications     diphenhydrAMINE (BENADRYL) 25 MG tablet     Sig: Take 25 mg by mouth every 6 hours as needed for itching or allergies     lisinopril (ZESTRIL) 20 MG tablet     Sig: Take 1 tablet (20 mg) by mouth daily     Dispense:  90 tablet     Refill:  3     rosuvastatin (CRESTOR) 40 MG tablet     Sig: Take 1 tablet (40 mg) by mouth daily     Dispense:  90 tablet     Refill:  3       Medications Discontinued During This Encounter   Medication Reason     lisinopril (ZESTRIL) 5 MG tablet      atorvastatin (LIPITOR) 40 MG tablet          Encounter Diagnosis   Name Primary?     Coronary artery disease involving native coronary artery of native heart without angina pectoris        CURRENT MEDICATIONS:  Current Outpatient Medications   Medication Sig Dispense Refill     albuterol (PROAIR HFA/PROVENTIL HFA/VENTOLIN HFA) 108 (90 Base) MCG/ACT inhaler Inhale 2 puffs into the lungs every 6 hours as  "needed for shortness of breath / dyspnea or wheezing       aspirin (ASA) 81 MG EC tablet Take 1 tablet (81 mg) by mouth daily 90 tablet 3     clopidogrel (PLAVIX) 75 MG tablet Take 1 tablet (75 mg) by mouth daily (Take in place of Brilinta) 90 tablet 1     diphenhydrAMINE (BENADRYL) 25 MG tablet Take 25 mg by mouth every 6 hours as needed for itching or allergies       famotidine (PEPCID) 20 MG tablet Take 1 tablet (20 mg) by mouth 2 times daily as needed (heartburn, chest discomfort) 60 tablet 1     lisinopril (ZESTRIL) 20 MG tablet Take 1 tablet (20 mg) by mouth daily 90 tablet 3     metoprolol tartrate (LOPRESSOR) 25 MG tablet Take 1 tablet (25 mg) by mouth 2 times daily 60 tablet 3     nitroGLYcerin (NITROSTAT) 0.4 MG sublingual tablet For chest pain place 1 tablet under the tongue every 5 minutes for 3 doses. If symptoms persist 5 minutes after 2nd dose call 911. 30 tablet 0     rosuvastatin (CRESTOR) 40 MG tablet Take 1 tablet (40 mg) by mouth daily 90 tablet 3       ALLERGIES     Allergies   Allergen Reactions     Erythromycin      Aminophylline      \"got jittery\"       PAST MEDICAL HISTORY:  Past Medical History:   Diagnosis Date     Acid reflux      Asthma      Hiatal hernia        PAST SURGICAL HISTORY:  Past Surgical History:   Procedure Laterality Date     APPENDECTOMY        SECTION      x5     CV CORONARY ANGIOGRAM N/A 3/26/2021    Procedure: Coronary Angiogram;  Surgeon: Madi Dye MD;  Location:  HEART CARDI[facility] CATH LAB     CV HEART CATHETERIZATION WITH POSSIBLE INTERVENTION N/A 2021    Procedure: coronary angiogram;  Surgeon: Madi Dye MD;  Location:  HEART CARDIAC CATH LAB     CV INSTANTANEOUS WAVE-FREE RATIO N/A 3/26/2021    Procedure: Instantaneous Wave-Free Ratio;  Surgeon: Madi Dye MD;  Location:  HEART CARDIAC CATH LAB     CV PCI STENT DRUG ELUTING N/A 3/26/2021    Procedure: Percutaneous Coronary Intervention Stent Drug Eluting;  Surgeon: Madi Dye" MD Jayde;  Location:  HEART CARDIAC CATH LAB     ORTHOPEDIC SURGERY       TONSILLECTOMY         FAMILY HISTORY:  Family History   Problem Relation Age of Onset     Heart Disease Mother          73yo [place] emphysema smoker     Cancer Father          87yo lung smoker     Family History Negative Sister 32        lung issue     Family History Negative Son      Family History Negative Son      Family History Negative Son      Family History Negative Daughter      Neurologic Disorder Daughter 23        MS     Cancer Maternal Grandfather          age? throat smoker       SOCIAL HISTORY:  Social History     Socioeconomic History     Marital status: Single     Spouse name: None     Number of children: 5     Years of education: None     Highest education level: None   Occupational History     Employer: [employer]   Social Needs     Financial resource strain: None     Food insecurity     Worry: None     Inability: None     Transportation needs     Medical: None     Non-medical: None   Tobacco Use     Smoking status: Current Every Day Smoker     Packs/day: 0.50     Years: 40.00     Pack years: 20.00     Smokeless tobacco: Never Used     Tobacco comment: Smoker since age 13. 1/2-3/4 ppd.   Substance and Sexual Activity     Alcohol use: No     Drug use: No     Sexual activity: Not Currently   Lifestyle     Physical activity     Days per week: None     Minutes per session: None     Stress: None   Relationships     Social connections     Talks on phone: None     Gets together: None     Attends Caodaism service: None     Active member of club or organization: None     Attends meetings of clubs or organizations: None     Relationship status: None     Intimate partner violence     Fear of current or ex partner: None     Emotionally abused: None     Physically abused: None     Forced sexual activity: None   Other Topics Concern     None   Social History Narrative     None       Review of Systems:  Skin:   "Positive for bruising     Eyes:  Negative      ENT:  Negative      Respiratory:  Negative       Cardiovascular:    Positive for;edema occ of the ankles  Gastroenterology: Negative      Genitourinary:  Negative      Musculoskeletal:  Negative      Neurologic:  Negative      Psychiatric:  Positive for sleep disturbances    Heme/Lymph/Imm:  Positive for allergies RX  Endocrine:  Negative        Physical Exam:  Vitals: BP (!) 162/96   Pulse 76   Ht 1.575 m (5' 2\")   Wt 48.1 kg (106 lb 1.6 oz)   LMP  (LMP Unknown)   BMI 19.41 kg/m      Constitutional:  cooperative thin      Skin:  warm and dry to the touch          Head:  normocephalic        Eyes:           Lymph:      ENT:           Neck:  JVP normal;no carotid bruit        Respiratory:  clear to auscultation;normal respiratory excursion         Cardiac: regular rhythm;normal S1 and S2     no presence of murmur          pulses full and equal                                        GI:           Extremities and Muscular Skeletal:  no edema              Neurological:  affect appropriate        Psych:  Alert and Oriented x 3      Thank you for allowing me to participate in the care of your patient.      Sincerely,     ANGÉLICA Nice CNP     [facility]    cc:   ANGÉLICA Brannon CNP  [address]  [place] [address]        "

## 2021-06-23 ENCOUNTER — TELEPHONE (OUTPATIENT)
Dept: CARDIOLOGY | Facility: CLINIC | Age: 61
End: 2021-06-23

## 2021-06-23 DIAGNOSIS — I10 BENIGN ESSENTIAL HYPERTENSION: ICD-10-CM

## 2021-06-23 DIAGNOSIS — I25.10 CORONARY ARTERY DISEASE INVOLVING NATIVE CORONARY ARTERY OF NATIVE HEART WITHOUT ANGINA PECTORIS: Primary | ICD-10-CM

## 2021-06-23 RX ORDER — LOSARTAN POTASSIUM 50 MG/1
50 TABLET ORAL DAILY
Qty: 90 TABLET | Refills: 3 | Status: SHIPPED | OUTPATIENT
Start: 2021-06-23 | End: 2021-07-11

## 2021-06-23 NOTE — TELEPHONE ENCOUNTER
Stop Lisinopril and start Losartan  Im worried that her BP is still elevated.  Follow up noted (7/12/21)  Placed order for BP check next week  With labs Thanks, JS

## 2021-06-23 NOTE — TELEPHONE ENCOUNTER
Left detailed message with Candelaria's recommendations and the importance of making a nurse BP check and labs.  Licha Noel RN on 6/23/2021 at 2:32 PM

## 2021-06-23 NOTE — TELEPHONE ENCOUNTER
Patient saw Candelaria Garza on 5-19-21 for follow up CAD had PCI on 3-24-21.  Increased  lisinopril to 20mg for better BP control.   She states ever since she has been on this medication she gets daily headaches.  She is taking Tylenol for the headaches but is wondering if she can change to a different med?  Has a follow up with Dr. Thurston 7-12-21. Denies a cough or any other symptoms that are usual side effects of Lisinopril.  Smith

## 2021-06-24 DIAGNOSIS — R07.89 OTHER CHEST PAIN: ICD-10-CM

## 2021-06-24 DIAGNOSIS — K52.9 GASTROENTERITIS: ICD-10-CM

## 2021-06-24 RX ORDER — FAMOTIDINE 20 MG/1
20 TABLET, FILM COATED ORAL 2 TIMES DAILY PRN
Qty: 60 TABLET | Refills: 1 | Status: CANCELLED | OUTPATIENT
Start: 2021-06-24

## 2021-06-25 NOTE — TELEPHONE ENCOUNTER
Pt notified. Has Prilosec on hand and will try to see if it works. Once daily per Dr. Randle.   JAVIER Overton

## 2021-06-25 NOTE — TELEPHONE ENCOUNTER
Pending Prescriptions:                       Disp   Refills    famotidine (PEPCID) 20 MG tablet           60 tab*1        Sig: Take 1 tablet (20 mg) by mouth 2 times daily as           needed (heartburn, chest discomfort)    Routing refill request to provider for review/approval because:  Last ordered in the hospital

## 2021-06-25 NOTE — TELEPHONE ENCOUNTER
This medication was not prescribed by me, please check with the patient why she is taking Pepcid and who had prescribed

## 2021-06-25 NOTE — TELEPHONE ENCOUNTER
Spoke with pt. Pepcid was prescribed while in hospital due to heartburn and stomach upset from other meds.   JAVIER Overton

## 2021-07-08 ENCOUNTER — DOCUMENTATION ONLY (OUTPATIENT)
Dept: CARDIOLOGY | Facility: CLINIC | Age: 61
End: 2021-07-08

## 2021-07-08 ENCOUNTER — ALLIED HEALTH/NURSE VISIT (OUTPATIENT)
Dept: CARDIOLOGY | Facility: CLINIC | Age: 61
End: 2021-07-08
Attending: NURSE PRACTITIONER
Payer: COMMERCIAL

## 2021-07-08 VITALS — DIASTOLIC BLOOD PRESSURE: 108 MMHG | HEART RATE: 62 BPM | SYSTOLIC BLOOD PRESSURE: 178 MMHG

## 2021-07-08 DIAGNOSIS — I25.10 CORONARY ARTERY DISEASE INVOLVING NATIVE CORONARY ARTERY OF NATIVE HEART WITHOUT ANGINA PECTORIS: Primary | ICD-10-CM

## 2021-07-08 DIAGNOSIS — I10 BENIGN ESSENTIAL HYPERTENSION: ICD-10-CM

## 2021-07-08 DIAGNOSIS — I25.10 CORONARY ARTERY DISEASE INVOLVING NATIVE CORONARY ARTERY OF NATIVE HEART WITHOUT ANGINA PECTORIS: ICD-10-CM

## 2021-07-08 LAB
ANION GAP SERPL CALCULATED.3IONS-SCNC: 3 MMOL/L (ref 3–14)
BUN SERPL-MCNC: 12 MG/DL (ref 7–30)
CALCIUM SERPL-MCNC: 9.1 MG/DL (ref 8.5–10.1)
CHLORIDE SERPL-SCNC: 105 MMOL/L (ref 94–109)
CHOLEST SERPL-MCNC: 173 MG/DL
CO2 SERPL-SCNC: 27 MMOL/L (ref 20–32)
CREAT SERPL-MCNC: 0.78 MG/DL (ref 0.52–1.04)
GFR SERPL CREATININE-BSD FRML MDRD: 82 ML/MIN/{1.73_M2}
GLUCOSE SERPL-MCNC: 85 MG/DL (ref 70–99)
HDLC SERPL-MCNC: 59 MG/DL
LDLC SERPL CALC-MCNC: 88 MG/DL
NONHDLC SERPL-MCNC: 114 MG/DL
POTASSIUM SERPL-SCNC: 4.6 MMOL/L (ref 3.4–5.3)
SODIUM SERPL-SCNC: 135 MMOL/L (ref 133–144)
TRIGL SERPL-MCNC: 129 MG/DL

## 2021-07-08 PROCEDURE — 80048 BASIC METABOLIC PNL TOTAL CA: CPT | Performed by: NURSE PRACTITIONER

## 2021-07-08 PROCEDURE — 99207 PR NO CHARGE LOS: CPT

## 2021-07-08 PROCEDURE — 36415 COLL VENOUS BLD VENIPUNCTURE: CPT | Performed by: NURSE PRACTITIONER

## 2021-07-08 PROCEDURE — 80061 LIPID PANEL: CPT | Performed by: NURSE PRACTITIONER

## 2021-07-08 NOTE — PROGRESS NOTES
[facility] BLOOD PRESSURE CHECK     Last office visit: 5/19/21     Previous blood pressure: 162/96 mm Hg  Previous heart rate: 76 bpm      Time of visit: 10:00    Morning medications were taken at: Pt takes at 11:30 am. Last does was yesterday at 11:30 am for Losartan and Metoprolol Tartrate at 11:30 pm.     Today's blood pressure: 178/108 mm Hg (Pt did not take morning meds)  Today's heart rate: 62 bpm     Home monitor blood pressure: does take it at home and it and it is running below 140's. Takes it randomly thru out the day.   Home monitor heart rate: N/A    Additional Comments: HA have gone away with the recent med change. Over all feeling well. Some personal stressors.       Results routed to: Candelaria Garza/Licha Noel RN      Ordering Provider: Candelaria Garza CNP  In clinic Provider: Dr. Nicholson

## 2021-07-11 RX ORDER — LOSARTAN POTASSIUM 100 MG/1
100 TABLET ORAL DAILY
Qty: 90 TABLET | Refills: 3 | Status: SHIPPED | OUTPATIENT
Start: 2021-07-11 | End: 2021-10-26

## 2021-07-12 ENCOUNTER — OFFICE VISIT (OUTPATIENT)
Dept: CARDIOLOGY | Facility: CLINIC | Age: 61
End: 2021-07-12
Payer: COMMERCIAL

## 2021-07-12 VITALS
HEART RATE: 63 BPM | BODY MASS INDEX: 20.85 KG/M2 | OXYGEN SATURATION: 99 % | WEIGHT: 113.3 LBS | HEIGHT: 62 IN | SYSTOLIC BLOOD PRESSURE: 172 MMHG | DIASTOLIC BLOOD PRESSURE: 88 MMHG

## 2021-07-12 DIAGNOSIS — I10 BENIGN ESSENTIAL HYPERTENSION: Primary | ICD-10-CM

## 2021-07-12 DIAGNOSIS — I25.10 CORONARY ARTERY DISEASE INVOLVING NATIVE CORONARY ARTERY OF NATIVE HEART WITHOUT ANGINA PECTORIS: ICD-10-CM

## 2021-07-12 PROCEDURE — 99214 OFFICE O/P EST MOD 30 MIN: CPT | Performed by: INTERNAL MEDICINE

## 2021-07-12 RX ORDER — HYDROCHLOROTHIAZIDE 12.5 MG/1
12.5 CAPSULE ORAL DAILY
Qty: 90 CAPSULE | Refills: 11 | Status: SHIPPED | OUTPATIENT
Start: 2021-07-12 | End: 2021-10-26

## 2021-07-12 ASSESSMENT — MIFFLIN-ST. JEOR: SCORE: 1032.18

## 2021-07-12 NOTE — PROGRESS NOTES
Service Date: 2021    Carlos Alberto Randle MD  [place] [address] [address] [facility] [address]  [place]  [address]    RE:  Ramila Dietrich  MRN:  [id number]  :  1960    Dear Dr. Randle:    Ramila Dietrich, a 61-year-old woman with a history of coronary artery disease, hypertension, and cigarette smoking was seen today at your request for followup.    Since the patient was last seen in 2021, she has been free of chest, arm, neck, jaw or back discomfort with exertion.  She is anxious to return back to her usual work at the [employer].  During recent visits, her blood pressure has been consistently elevated in the 170/85-90 range despite her current therapy.  She has cut down smoking to about a half a pack per day, but still smokes cigarettes.  She reported a headache on lisinopril and was switched to losartan.    PAST MEDICAL HISTORY:    1.  Coronary artery disease.  a.  Presentation with chest pain 2021.  Diagnostic coronary angiography showing no significant narrowing in left main or circumflex.  Moderate narrowing in the mid LAD with normal iFR. RCA with 70% proximal and 80% distal lesions, successfully treated with 2 AKASH.   b.  Presentation with recurrent chest pain.  Diagnostic angiography showing all stent sites widely patent with no evidence of new lesion.  Echo showing a normal ejection fraction with no regional wall motion abnormalities.  2.  Tobacco use history -- still smoking cigarettes at least 1/2 pack per day.  3.  Hypertension -- still poorly controlled despite Losartan 100 mg daily, metoprolol 25 b.i.d.  4.  Dyslipidemia, on rosuvastatin -- most recent LDL cholesterol of 88.    PHYSICAL EXAMINATION:    GENERAL:  Exam today demonstrates a very pleasant and cooperative 61-year-old woman.  VITAL SIGNS:  Her blood pressure was 172/88, her heart rate 63.  Her height is 1.57 meters.  Her weight is 51 kg, BMI is 20.7.  LUNGS:  Clear to percussion and auscultation.  CARDIOVASCULAR:   Shows a normal S1 with a normal S2.  There is no S3.  There is no murmur, rub or click.    EXTREMITIES:  Her pulses are full and symmetrical.    MEDICATIONS:    1.  Albuterol inhaler.  2.  Aspirin 81 daily.  3.  Clopidogrel 75 daily.  4.  Famotidine 20 mg twice a day.  5.  Losartan 100 mg daily.  6.  Metoprolol 25 b.i.d.  7.  Rosuvastatin 40 daily.    LABORATORY STUDIES:  Her most recent LDL was 88 with HDL 59.  Her most recent creatinine was 0.78 with potassium of 4.6.    ASSESSMENT:  Ms. Dietrich's blood pressure control is still suboptimal.( ACC recommended target is  130/80 mmHg or less.   I would recommend the addition of hydrochlorothiazide.  If we are still above target blood pressures, I would switch from metoprolol to carvedilol and consider spironolactone..She should be encouraged to abstain from tobacco.    RECOMMENDATIONS:    1.  Add hydrochlorothiazide 12.5 daily.  2.  Followup visit in about 2 weeks with ERINN.  If  we are not at target at that time would change metoprolol to carvedilol 12.5 b.i.d. Ultimately we could add spironolactone if we cannot reach target  3b.  Follow up with me in about 1 year.  3. Stop smoking    We greatly appreciate the opportunity to care for your patient, Ramila Dietrich.    Cameron Thurston MD        D: 2021   T: 2021   MT: DIANE    Name:     RAMILA DIETRICH  MRN:      [id number]        Account:      [id number]   :      1960           Service Date: 2021       Document: [id number]

## 2021-07-12 NOTE — PROGRESS NOTES
Reviewed recent BP and labs.  BMP is within normal limits.  Her BP is still elevated  Recommend increasing Losartan to 100 mg and return in 2 wks with repeat BMP.  Her LDL is still elevated despite Crestor 40 mg  Would consider adding Zetia in future after repeat lipids in 3 months after lifestyle modification  Pls call to advise  ThanksBREANNE

## 2021-07-12 NOTE — PROGRESS NOTES
HISTORY OF PRESENT ILLNESS:  Asymptomatic, but hypertensive  170/80 despite current meds. Still smoking trying to cut donw    Orders this Visit:  No orders of the defined types were placed in this encounter.    Orders Placed This Encounter   Medications     hydrochlorothiazide (MICROZIDE) 12.5 MG capsule     Sig: Take 1 capsule (12.5 mg) by mouth daily     Dispense:  90 capsule     Refill:  11     There are no discontinued medications.    Encounter Diagnoses   Name Primary?     Coronary artery disease involving native coronary artery of native heart without angina pectoris      Benign essential hypertension Yes       CURRENT MEDICATIONS:  Current Outpatient Medications   Medication Sig Dispense Refill     albuterol (PROAIR HFA/PROVENTIL HFA/VENTOLIN HFA) 108 (90 Base) MCG/ACT inhaler Inhale 2 puffs into the lungs every 6 hours as needed for shortness of breath / dyspnea or wheezing       aspirin (ASA) 81 MG EC tablet Take 1 tablet (81 mg) by mouth daily 90 tablet 3     clopidogrel (PLAVIX) 75 MG tablet Take 1 tablet (75 mg) by mouth daily (Take in place of Brilinta) 90 tablet 1     famotidine (PEPCID) 20 MG tablet Take 1 tablet (20 mg) by mouth 2 times daily as needed (heartburn, chest discomfort) 60 tablet 1     hydrochlorothiazide (MICROZIDE) 12.5 MG capsule Take 1 capsule (12.5 mg) by mouth daily 90 capsule 11     losartan (COZAAR) 100 MG tablet Take 1 tablet (100 mg) by mouth daily 90 tablet 3     metoprolol tartrate (LOPRESSOR) 25 MG tablet Take 1 tablet (25 mg) by mouth 2 times daily 60 tablet 3     nitroGLYcerin (NITROSTAT) 0.4 MG sublingual tablet For chest pain place 1 tablet under the tongue every 5 minutes for 3 doses. If symptoms persist 5 minutes after 2nd dose call 911. 30 tablet 0     rosuvastatin (CRESTOR) 40 MG tablet Take 1 tablet (40 mg) by mouth daily 90 tablet 3     diphenhydrAMINE (BENADRYL) 25 MG tablet Take 25 mg by mouth every 6 hours as needed for itching or allergies (Patient not taking:  "Reported on 7/12/2021)         ALLERGIES     Allergies   Allergen Reactions     Erythromycin      Aminophylline      \"got jittery\"       PAST MEDICAL, SURGICAL, FAMILY, SOCIAL HISTORY:  History was reviewed and updated as needed, see medical record.    Review of Systems:  A 12-point review of systems was completed, see medical record for detailed review of systems information.    Physical Exam:  Vitals: BP (!) 172/88 (BP Location: Right arm, Patient Position: Left side, Cuff Size: Adult Small)   Pulse 63   Ht 1.575 m (5' 2\")   Wt 51.4 kg (113 lb 4.8 oz)   LMP  (LMP Unknown)   SpO2 99%   BMI 20.72 kg/m      Constitutional:           Skin:           Head:           Eyes:           ENT:           Neck:           Chest:           Cardiac:                    Abdomen:           Vascular:                                        Extremities and Back:           Neurological:           ASSESSMENT: BP control suboptimal       RECOMMENDATIONS:   Add hydrochlorothiazide 12.5  Consider switch to carvedilol if bp still below targe  ERINN in 2 to 3 weeks.      Recent Lab Results:  LIPID RESULTS:  Lab Results   Component Value Date    CHOL 173 07/08/2021    HDL 59 07/08/2021    LDL 88 07/08/2021    TRIG 129 07/08/2021       LIVER ENZYME RESULTS:  Lab Results   Component Value Date    AST 27 01/08/2021    ALT 19 05/19/2021       CBC RESULTS:  Lab Results   Component Value Date    WBC 11.7 (H) 04/06/2021    RBC 3.23 (L) 04/06/2021    HGB 10.1 (L) 04/06/2021    HCT 29.6 (L) 04/06/2021    MCV 92 04/06/2021    MCH 31.3 04/06/2021    MCHC 34.1 04/06/2021    RDW 12.2 04/06/2021     04/06/2021       BMP RESULTS:  Lab Results   Component Value Date     07/08/2021    POTASSIUM 4.6 07/08/2021    CHLORIDE 105 07/08/2021    CO2 27 07/08/2021    ANIONGAP 3 07/08/2021    GLC 85 07/08/2021    BUN 12 07/08/2021    CR 0.78 07/08/2021    GFRESTIMATED 82 07/08/2021    GFRESTBLACK >90 07/08/2021    REJI 9.1 07/08/2021        A1C " RESULTS:  Lab Results   Component Value Date    A1C 5.2 03/24/2021       INR RESULTS:  Lab Results   Component Value Date    INR 0.93 10/25/2014       We greatly appreciate the opportunity to be involved in the care of your patient, Ramila Dietrich.    Sincerely,  Cameron Thurston MD      CC  Candelaria Garza, APRN CNP  [address]  [place] [id number]

## 2021-07-12 NOTE — LETTER
7/12/2021    Anne Randle MD  [address] [facility] [address]    RE: Ramila Dietrich       Dear Colleague,    I had the pleasure of seeing Ramila Dietrich in the [facility].    HISTORY OF PRESENT ILLNESS:  Asymptomatic, but hypertensive  170/80 despite current meds. Still smoking trying to cut donw    Orders this Visit:  No orders of the defined types were placed in this encounter.    Orders Placed This Encounter   Medications     hydrochlorothiazide (MICROZIDE) 12.5 MG capsule     Sig: Take 1 capsule (12.5 mg) by mouth daily     Dispense:  90 capsule     Refill:  11     There are no discontinued medications.    Encounter Diagnoses   Name Primary?     Coronary artery disease involving native coronary artery of native heart without angina pectoris      Benign essential hypertension Yes       CURRENT MEDICATIONS:  Current Outpatient Medications   Medication Sig Dispense Refill     albuterol (PROAIR HFA/PROVENTIL HFA/VENTOLIN HFA) 108 (90 Base) MCG/ACT inhaler Inhale 2 puffs into the lungs every 6 hours as needed for shortness of breath / dyspnea or wheezing       aspirin (ASA) 81 MG EC tablet Take 1 tablet (81 mg) by mouth daily 90 tablet 3     clopidogrel (PLAVIX) 75 MG tablet Take 1 tablet (75 mg) by mouth daily (Take in place of Brilinta) 90 tablet 1     famotidine (PEPCID) 20 MG tablet Take 1 tablet (20 mg) by mouth 2 times daily as needed (heartburn, chest discomfort) 60 tablet 1     hydrochlorothiazide (MICROZIDE) 12.5 MG capsule Take 1 capsule (12.5 mg) by mouth daily 90 capsule 11     losartan (COZAAR) 100 MG tablet Take 1 tablet (100 mg) by mouth daily 90 tablet 3     metoprolol tartrate (LOPRESSOR) 25 MG tablet Take 1 tablet (25 mg) by mouth 2 times daily 60 tablet 3     nitroGLYcerin (NITROSTAT) 0.4 MG sublingual tablet For chest pain place 1 tablet under the tongue every 5 minutes for 3 doses. If symptoms persist 5 minutes after 2nd  "dose call 911. 30 tablet 0     rosuvastatin (CRESTOR) 40 MG tablet Take 1 tablet (40 mg) by mouth daily 90 tablet 3     diphenhydrAMINE (BENADRYL) 25 MG tablet Take 25 mg by mouth every 6 hours as needed for itching or allergies (Patient not taking: Reported on 7/12/2021)         ALLERGIES     Allergies   Allergen Reactions     Erythromycin      Aminophylline      \"got jittery\"       PAST MEDICAL, SURGICAL, FAMILY, SOCIAL HISTORY:  History was reviewed and updated as needed, see medical record.    Review of Systems:  A 12-point review of systems was completed, see medical record for detailed review of systems information.    Physical Exam:  Vitals: BP (!) 172/88 (BP Location: Right arm, Patient Position: Left side, Cuff Size: Adult Small)   Pulse 63   Ht 1.575 m (5' 2\")   Wt 51.4 kg (113 lb 4.8 oz)   LMP  (LMP Unknown)   SpO2 99%   BMI 20.72 kg/m      Constitutional:           Skin:           Head:           Eyes:           ENT:           Neck:           Chest:           Cardiac:                    Abdomen:           Vascular:                                        Extremities and Back:           Neurological:           ASSESSMENT: BP control suboptimal       RECOMMENDATIONS:   Add hydrochlorothiazide 12.5  Consider switch to carvedilol if bp still below targe  ERINN in 2 to 3 weeks.      Recent Lab Results:  LIPID RESULTS:  Lab Results   Component Value Date    CHOL 173 07/08/2021    HDL 59 07/08/2021    LDL 88 07/08/2021    TRIG 129 07/08/2021       LIVER ENZYME RESULTS:  Lab Results   Component Value Date    AST 27 01/08/2021    ALT 19 05/19/2021       CBC RESULTS:  Lab Results   Component Value Date    WBC 11.7 (H) 04/06/2021    RBC 3.23 (L) 04/06/2021    HGB 10.1 (L) 04/06/2021    HCT 29.6 (L) 04/06/2021    MCV 92 04/06/2021    MCH 31.3 04/06/2021    MCHC 34.1 04/06/2021    RDW 12.2 04/06/2021     04/06/2021       BMP RESULTS:  Lab Results   Component Value Date     07/08/2021    POTASSIUM 4.6 " 07/08/2021    CHLORIDE 105 07/08/2021    CO2 27 07/08/2021    ANIONGAP 3 07/08/2021    GLC 85 07/08/2021    BUN 12 07/08/2021    CR 0.78 07/08/2021    GFRESTIMATED 82 07/08/2021    GFRESTBLACK >90 07/08/2021    REJI 9.1 07/08/2021        A1C RESULTS:  Lab Results   Component Value Date    A1C 5.2 03/24/2021       INR RESULTS:  Lab Results   Component Value Date    INR 0.93 10/25/2014       We greatly appreciate the opportunity to be involved in the care of your patient, Ramila Dietrich.    Sincerely,  Cameron Thurston MD      CC  ANGÉLICA Brannon CNP  [address]  [place] [address]                                                                         Thank you for allowing me to participate in the care of your patient.      Sincerely,     Cameron Thurston MD     [facility]  cc:   ANGÉLICA Brannon CNP  [address]  [place] [id number]

## 2021-07-20 ENCOUNTER — HOSPITAL ENCOUNTER (EMERGENCY)
Facility: CLINIC | Age: 61
Discharge: HOME | End: 2021-07-20
Attending: EMERGENCY MEDICINE | Admitting: EMERGENCY MEDICINE
Payer: COMMERCIAL

## 2021-07-20 VITALS
OXYGEN SATURATION: 100 % | SYSTOLIC BLOOD PRESSURE: 144 MMHG | TEMPERATURE: 98.5 F | HEART RATE: 66 BPM | DIASTOLIC BLOOD PRESSURE: 77 MMHG | RESPIRATION RATE: 20 BRPM

## 2021-07-20 DIAGNOSIS — M54.6 MIDLINE THORACIC BACK PAIN, UNSPECIFIED CHRONICITY: ICD-10-CM

## 2021-07-20 PROCEDURE — 99285 EMERGENCY DEPT VISIT HI MDM: CPT | Mod: 25

## 2021-07-20 PROCEDURE — 96375 TX/PRO/DX INJ NEW DRUG ADDON: CPT

## 2021-07-20 PROCEDURE — 250N000011 HC RX IP 250 OP 636: Performed by: EMERGENCY MEDICINE

## 2021-07-20 PROCEDURE — 250N000013 HC RX MED GY IP 250 OP 250 PS 637: Performed by: EMERGENCY MEDICINE

## 2021-07-20 PROCEDURE — 96374 THER/PROPH/DIAG INJ IV PUSH: CPT

## 2021-07-20 RX ORDER — HYDROMORPHONE HYDROCHLORIDE 1 MG/ML
0.5 INJECTION, SOLUTION INTRAMUSCULAR; INTRAVENOUS; SUBCUTANEOUS ONCE
Status: COMPLETED | OUTPATIENT
Start: 2021-07-20 | End: 2021-07-20

## 2021-07-20 RX ORDER — CYCLOBENZAPRINE HCL 10 MG
10 TABLET ORAL ONCE
Status: COMPLETED | OUTPATIENT
Start: 2021-07-20 | End: 2021-07-20

## 2021-07-20 RX ORDER — KETOROLAC TROMETHAMINE 15 MG/ML
15 INJECTION, SOLUTION INTRAMUSCULAR; INTRAVENOUS ONCE
Status: COMPLETED | OUTPATIENT
Start: 2021-07-20 | End: 2021-07-20

## 2021-07-20 RX ORDER — CYCLOBENZAPRINE HCL 10 MG
10 TABLET ORAL 3 TIMES DAILY PRN
Qty: 18 TABLET | Refills: 0 | Status: SHIPPED | OUTPATIENT
Start: 2021-07-20 | End: 2021-07-26

## 2021-07-20 RX ADMIN — CYCLOBENZAPRINE HYDROCHLORIDE 10 MG: 10 TABLET, FILM COATED ORAL at 22:11

## 2021-07-20 RX ADMIN — HYDROMORPHONE HYDROCHLORIDE 0.5 MG: 1 INJECTION, SOLUTION INTRAMUSCULAR; INTRAVENOUS; SUBCUTANEOUS at 22:14

## 2021-07-20 RX ADMIN — KETOROLAC TROMETHAMINE 15 MG: 15 INJECTION, SOLUTION INTRAMUSCULAR; INTRAVENOUS at 22:15

## 2021-07-20 ASSESSMENT — ENCOUNTER SYMPTOMS
MYALGIAS: 1
NUMBNESS: 0
FEVER: 0
CONSTIPATION: 0
HEMATURIA: 0
DIARRHEA: 0
ABDOMINAL PAIN: 0
WEAKNESS: 0
DYSURIA: 0
BACK PAIN: 1
CHILLS: 0

## 2021-07-21 ASSESSMENT — ENCOUNTER SYMPTOMS
WOUND: 0
SHORTNESS OF BREATH: 0

## 2021-07-21 NOTE — ED PROVIDER NOTES
History   Chief Complaint:  Back Pain     HPI   Ramila Dietrich is a 61 year old female anticoagulated on Plavix who presents with back pain. Patient states she has a known bulging disc in her back that has been causing muscle spasms. She first began to feel some pain 5 days ago with no inciting event and it has continually worsened since then. She feels this is similar to previous back pain. No relief with heat, ice or Tylenol. She also mentions aching in her left upper leg. Denies numbness or weakness.  She has no bladder or bowel retention or incontinence.  She does have a history of similar pain in her back and was told she had a bulging disc.  Denies dysuria, hematuria, abdominal pain, diarrhea, constipation, fever or chills. She has never received steroid injections. She did have coronary stents put in 4 months ago.     Review of Systems   Constitutional: Negative for chills and fever.   Respiratory: Negative for shortness of breath.    Cardiovascular: Negative for chest pain.   Gastrointestinal: Negative for abdominal pain, constipation and diarrhea.   Genitourinary: Negative for dysuria and hematuria.   Musculoskeletal: Positive for back pain and myalgias.   Skin: Negative for rash and wound.   Neurological: Negative for weakness and numbness.   All other systems reviewed and are negative.        Allergies:  Erythromycin  Aminophylline    Medications:  Microzide  Cozaar  Crestor  Aspirin 81 mg  Lopressor  Plavix  Pepcid  Albuterol inhaler    Past Medical History:    Asthma  Hernia  Acid reflux  Coronary artery disease   Unstable angina    Past Surgical History:    Appendectomy     CV heart catheterization with possible intervention  CV instantaneous wave-free ratio  CV PCI stent drug eluting  Tonsillectomy   Orthopedic surgery    Family History:    Mother- emphysema  Daughter- multiple sclerosis    Social History:  Presents with her significant other  Smoker    Physical Exam     Patient Vitals for  the past 24 hrs:   BP Temp Temp src Pulse Resp SpO2   07/20/21 2248 (!) 144/77 -- -- 66 -- --   07/20/21 2129 (!) 151/78 98.5  F (36.9  C) Temporal 69 20 100 %       Physical Exam  Constitutional: Well appearing.  HEENT: Atraumatic.  Moist mucous membranes.  Neck: Soft.  Supple.    Cardiac: Regular rate and rhythm.  No murmur or rub.  Respiratory: Clear to auscultation bilaterally.  No respiratory distress.    Abdomen: Soft and nontender. Nondistended.  Musculoskeletal: No edema.  Normal range of motion.  No visible abnormality of the back.  No real specific tenderness to palpation of the back.  Neurologic: Alert and oriented x3.  Normal tone and bulk.  5/5 strength in lower extremities.  Sensation to light touch intact throughout.  Normal gait.  Skin: No rashes.  No edema.  Psych: Normal affect.  Normal behavior.      Emergency Department Course     Emergency Department Course:    Reviewed:  I reviewed nursing notes, vitals, past medical history and care everywhere    Assessments:  2200 I obtained history and examined the patient as noted above.   2319 I rechecked the patient and explained findings.     Interventions:  2211: Flexeril 10 mg PO   2214: Dilaudid 0.5 mg IV   2215: Toradol 15 mg IV     Disposition:  The patient was discharged to home.     Impression & Plan   Medical Decision Making:  Ramila Dietrich is a 61-year-old woman who is afebrile and hemodynamically stable.  She is neurovascularly intact in lower extremities with no red flag back pain symptoms that would necessitate emergent imaging for infectious or inflammatory condition such as quad equina syndrome, epidural abscess, or other.  She is given the above intervention with complete resolution of her pain.  She feels countable discharging home.  Discussed plan for home with Flexeril, close primary care/orthopedic spine surgery follow-up, and supportive care.  She is in agreement with the plan and her questions were answered.  We discussed red flag  precautions for which to return.  She has a known herniated disc in the area of her pain has not had issues for a few years and has recently been quite active setting [place] the state fair.  I recommended rest.  She is in agreement with this plan, her questions were answered, and she was in no distress time of discharge.    Diagnosis:    ICD-10-CM    1. Midline thoracic back pain, unspecified chronicity  M54.6        Discharge Medications:  New Prescriptions    CYCLOBENZAPRINE (FLEXERIL) 10 MG TABLET    Take 1 tablet (10 mg) by mouth 3 times daily as needed for muscle spasms       Scribe Disclosure:  I, Kandice Alexandre, am serving as a scribe at 9:54 PM on 7/20/2021 to document services personally performed by Keven Mtz MD based on my observations and the provider's statements to me.             Keven Mtz MD  07/21/21 0056

## 2021-07-21 NOTE — ED TRIAGE NOTES
Pt aox4, ABCs intact. Pt c/o acute on chronic midback pain. Pt has known bulging disc and is now having muscle spasms. No improvement with heat/ice/tylenol.

## 2021-08-25 DIAGNOSIS — I20.0 UNSTABLE ANGINA (H): ICD-10-CM

## 2021-08-25 RX ORDER — METOPROLOL TARTRATE 25 MG/1
25 TABLET, FILM COATED ORAL 2 TIMES DAILY
Qty: 180 TABLET | Refills: 0 | Status: SHIPPED | OUTPATIENT
Start: 2021-08-25 | End: 2022-03-31

## 2021-08-25 NOTE — TELEPHONE ENCOUNTER
Received refill request for:  Metoprolol Tartrate  Last OV was: 7/12/2021 with Dr. Thurston  Labs/EKG: n/a  F/U scheduled: overdue orders in Epic.  Note to pharmacy and letter sent.  New script sent to: [facility]

## 2021-10-01 DIAGNOSIS — R07.89 OTHER CHEST PAIN: ICD-10-CM

## 2021-10-01 RX ORDER — CLOPIDOGREL BISULFATE 75 MG/1
75 TABLET ORAL DAILY
Qty: 90 TABLET | Refills: 1 | Status: SHIPPED | OUTPATIENT
Start: 2021-10-01 | End: 2022-04-01

## 2021-10-22 ENCOUNTER — TELEPHONE (OUTPATIENT)
Dept: CARDIOLOGY | Facility: CLINIC | Age: 61
End: 2021-10-22

## 2021-10-22 NOTE — PROGRESS NOTES
HISTORY OF PRESENT ILLNESS:    This is a 61 year old female who follows with Dr Thurston at [facility].   Her past medical history includes: hypertension, coronary artery disease, COPD, and  tobacco dependence     Ms Dietrich suffered acute coronary syndrome and underwent stenting to her proximal and distal RCA (3/24/21) A chest CT showed moderate emphysema  Her ECHO showed LVEF 55-60%, normal RV function, and no significant valvular pathology. She was placed on Brilinta, Atorvastatin, and Metoprolol  Her home Amlodipine was discontinued.     She was readmitted (4/6/21) with resting chest pain and ruled out for a MI. Coronary angiography was performed (4/6/21) which showed widely patent RCA stents and no significant obstructive CAD elsewhere.  Her chest pain was felt to likely be related to GERD and she was sent home on Pepcid.  Her Brilinta was changed to Plavix    She has since had ongoing hypertension and we have escalated her anti-hypertensive agents.  Hydrochlorothiazide was added a couple of months ago      Our visit today is for further review and labs    Ms Dietrich is a  at the [employer] and has no physical limitations while working. She stopped her hydrochlorothiazide shortly after starting it due to symptomatic hypotension. She states that her SBP was 76 mmHg  She then took Losartan 100 mg for a period of time, but again developed some SBPs < 90 mmHg  She went back to taking Losartan 50 mg tabs she had at home and states that her SBP was staying < 140 mmHg.  However, she ran out of them 4 days ago and has not had any losartan.  She denies any chest pain, shortness of breath, orthopnea, or peripheral edema.      I reviewed her labs today:  Sodium: 135  Potassium: 4.2  BUN: 11  Creatinine: 0.91  Cholesterol: 181  Triglycerides: 122  HDL: 62  LDL: 95      VITAL SIGNS:  BP: 154/84  Pulse:  80  Weight: 115 lbs (BMI: 21)      IMPRESSION AND PLAN:    Coronary Artery  Disease:  -s/p stenting x2 to her proximal and distal RCA (3/2021)  -cath (4/2021) showed widely patent stents and no other obstructive CAD  -Plavix for at least 1 yr    Hypertension:  -has only been taking Losartan 50 mg/day and Metoprolol 25 mg BID  -BP elevated today, but has ran out of her Losartan.  She is to resume Losartan 50 mg and return in 1 month  -consider changing Metoprolol to Coreg    Hyperlipidemia:  -on Crestor 40 mg  -LDL 95  -will add Zetia  -recheck lipids in 1 month    The total time for the visit today was 30 minutes which includes patient visit, reviewing of records, discussion, and placing of orders of the outpatient coordination of cardiovascular care as described.  The level of medical decision making during this visit was of moderate complexity.  Thank you for allowing me to participate in their care.    Orders Placed This Encounter   Procedures     Basic metabolic panel     Lipid Profile     ALT     Follow-Up with Cardiac Advanced Practice Provider       Orders Placed This Encounter   Medications     losartan (COZAAR) 50 MG tablet     Sig: Take 1 tablet (50 mg) by mouth daily     Dispense:  90 tablet     Refill:  3     ezetimibe (ZETIA) 10 MG tablet     Sig: Take 1 tablet (10 mg) by mouth daily     Dispense:  90 tablet     Refill:  3       Medications Discontinued During This Encounter   Medication Reason     hydrochlorothiazide (MICROZIDE) 12.5 MG capsule      losartan (COZAAR) 100 MG tablet      diphenhydrAMINE (BENADRYL) 25 MG tablet          Encounter Diagnoses   Name Primary?     Coronary artery disease involving native coronary artery of native heart without angina pectoris      Benign essential hypertension        CURRENT MEDICATIONS:  Current Outpatient Medications   Medication Sig Dispense Refill     albuterol (PROAIR HFA/PROVENTIL HFA/VENTOLIN HFA) 108 (90 Base) MCG/ACT inhaler Inhale 2 puffs into the lungs every 6 hours as needed for shortness of breath / dyspnea or wheezing    "    aspirin (ASA) 81 MG EC tablet Take 1 tablet (81 mg) by mouth daily 90 tablet 3     clopidogrel (PLAVIX) 75 MG tablet Take 1 tablet (75 mg) by mouth daily (Take in place of Brilinta) 90 tablet 1     ezetimibe (ZETIA) 10 MG tablet Take 1 tablet (10 mg) by mouth daily 90 tablet 3     famotidine (PEPCID) 20 MG tablet Take 1 tablet (20 mg) by mouth 2 times daily as needed (heartburn, chest discomfort) 60 tablet 1     losartan (COZAAR) 50 MG tablet Take 1 tablet (50 mg) by mouth daily 90 tablet 3     metoprolol tartrate (LOPRESSOR) 25 MG tablet Take 1 tablet (25 mg) by mouth 2 times daily 180 tablet 0     nitroGLYcerin (NITROSTAT) 0.4 MG sublingual tablet For chest pain place 1 tablet under the tongue every 5 minutes for 3 doses. If symptoms persist 5 minutes after 2nd dose call 911. 30 tablet 0     rosuvastatin (CRESTOR) 40 MG tablet Take 1 tablet (40 mg) by mouth daily 90 tablet 3       ALLERGIES     Allergies   Allergen Reactions     Erythromycin      Aminophylline      \"got jittery\"       PAST MEDICAL HISTORY:  Past Medical History:   Diagnosis Date     Acid reflux      Asthma      Hiatal hernia        PAST SURGICAL HISTORY:  Past Surgical History:   Procedure Laterality Date     APPENDECTOMY        SECTION      x5     CV CORONARY ANGIOGRAM N/A 3/26/2021    Procedure: Coronary Angiogram;  Surgeon: Madi Dye MD;  Location: [facility] CARDIAC CATH LAB     CV HEART CATHETERIZATION WITH POSSIBLE INTERVENTION N/A 2021    Procedure: coronary angiogram;  Surgeon: Madi Dye MD;  Location: [facility] CARDIAC CATH LAB     CV INSTANTANEOUS WAVE-FREE RATIO N/A 3/26/2021    Procedure: Instantaneous Wave-Free Ratio;  Surgeon: Madi Dye MD;  Location: [facility] CARDIAC CATH LAB     CV PCI STENT DRUG ELUTING N/A 3/26/2021    Procedure: Percutaneous Coronary Intervention Stent Drug Eluting;  Surgeon: Madi Dye MD;  Location:  HEART CARDIAC CATH LAB     ORTHOPEDIC SURGERY       TONSILLECTOMY "         FAMILY HISTORY:  Family History   Problem Relation Age of Onset     Heart Disease Mother          75yo [place] emphysema smoker     Cancer Father          85yo lung smoker     Family History Negative Sister 32        lung issue     Family History Negative Son      Family History Negative Son      Family History Negative Son      Family History Negative Daughter      Neurologic Disorder Daughter 23        MS     Cancer Maternal Grandfather          age? throat smoker       SOCIAL HISTORY:  Social History     Socioeconomic History     Marital status: Single     Spouse name: None     Number of children: 5     Years of education: None     Highest education level: None   Occupational History     Employer: [employer]   Tobacco Use     Smoking status: Current Every Day Smoker     Packs/day: 0.50     Years: 40.00     Pack years: 20.00     Smokeless tobacco: Never Used     Tobacco comment: Smoker since age 13. 1/2-3/4 ppd.   Substance and Sexual Activity     Alcohol use: No     Drug use: No     Sexual activity: Not Currently   Other Topics Concern     None   Social History Narrative     None     Social Determinants of Health     Financial Resource Strain:      Difficulty of Paying Living Expenses:    Food Insecurity:      Worried About Running Out of Food in the Last Year:      Ran Out of Food in the Last Year:    Transportation Needs:      Lack of Transportation (Medical):      Lack of Transportation (Non-Medical):    Physical Activity:      Days of Exercise per Week:      Minutes of Exercise per Session:    Stress:      Feeling of Stress :    Social Connections:      Frequency of Communication with Friends and Family:      Frequency of Social Gatherings with Friends and Family:      Attends Jew Services:      Active Member of Clubs or Organizations:      Attends Club or Organization Meetings:      Marital Status:    Intimate Partner Violence:      Fear of Current or Ex-Partner:       "Emotionally Abused:      Physically Abused:      Sexually Abused:        Review of Systems:  Skin:  Positive for bruising     Eyes:  Negative      ENT:  Negative      Respiratory:  Negative       Cardiovascular:  Negative      Gastroenterology: Positive for heartburn    Genitourinary:  not assessed      Musculoskeletal:  Negative      Neurologic:  Negative      Psychiatric:  Positive for anxiety    Heme/Lymph/Imm:  Negative      Endocrine:  Negative        Physical Exam:  Vitals: BP (!) 154/84   Pulse 80   Ht 1.575 m (5' 2\")   Wt 52.2 kg (115 lb)   LMP  (LMP Unknown)   BMI 21.03 kg/m      Constitutional:  cooperative thin      Skin:  warm and dry to the touch          Head:  normocephalic        Eyes:           Lymph:      ENT:  no pallor or cyanosis        Neck:  JVP normal;no carotid bruit        Respiratory:  clear to auscultation;normal respiratory excursion         Cardiac: regular rhythm;normal S1 and S2     no presence of murmur          pulses full and equal                                        GI:  abdomen soft        Extremities and Muscular Skeletal:  no edema              Neurological:  affect appropriate        Psych:  Alert and Oriented x 3          CC  Cameron Thurston MD  [address]  [place] [id number]                  "

## 2021-10-22 NOTE — TELEPHONE ENCOUNTER
Pt needs lipids and BMP done prior to my appointment next week  Pls  Arrange this  Thanks, BREANNE

## 2021-10-25 NOTE — TELEPHONE ENCOUNTER
BMP and Lipids arranged for prior to visit 10/26/21.    Mayela Benitez RN, BSN  10/25/21 at 8:59 AM

## 2021-10-26 ENCOUNTER — LAB (OUTPATIENT)
Dept: LAB | Facility: CLINIC | Age: 61
End: 2021-10-26
Payer: COMMERCIAL

## 2021-10-26 ENCOUNTER — OFFICE VISIT (OUTPATIENT)
Dept: CARDIOLOGY | Facility: CLINIC | Age: 61
End: 2021-10-26
Attending: INTERNAL MEDICINE
Payer: COMMERCIAL

## 2021-10-26 VITALS
BODY MASS INDEX: 21.16 KG/M2 | WEIGHT: 115 LBS | DIASTOLIC BLOOD PRESSURE: 84 MMHG | HEIGHT: 62 IN | HEART RATE: 80 BPM | SYSTOLIC BLOOD PRESSURE: 154 MMHG

## 2021-10-26 DIAGNOSIS — I25.10 CORONARY ARTERY DISEASE INVOLVING NATIVE CORONARY ARTERY OF NATIVE HEART WITHOUT ANGINA PECTORIS: ICD-10-CM

## 2021-10-26 DIAGNOSIS — I10 BENIGN ESSENTIAL HYPERTENSION: ICD-10-CM

## 2021-10-26 LAB
ALT SERPL W P-5'-P-CCNC: 18 U/L (ref 0–50)
ANION GAP SERPL CALCULATED.3IONS-SCNC: 7 MMOL/L (ref 3–14)
BUN SERPL-MCNC: 11 MG/DL (ref 7–30)
CALCIUM SERPL-MCNC: 8.9 MG/DL (ref 8.5–10.1)
CHLORIDE BLD-SCNC: 105 MMOL/L (ref 94–109)
CHOLEST SERPL-MCNC: 181 MG/DL
CO2 SERPL-SCNC: 23 MMOL/L (ref 20–32)
CREAT SERPL-MCNC: 0.91 MG/DL (ref 0.52–1.04)
FASTING STATUS PATIENT QL REPORTED: YES
GFR SERPL CREATININE-BSD FRML MDRD: 68 ML/MIN/1.73M2
GLUCOSE BLD-MCNC: 100 MG/DL (ref 70–99)
HDLC SERPL-MCNC: 62 MG/DL
LDLC SERPL CALC-MCNC: 95 MG/DL
NONHDLC SERPL-MCNC: 119 MG/DL
POTASSIUM BLD-SCNC: 4.2 MMOL/L (ref 3.4–5.3)
SODIUM SERPL-SCNC: 135 MMOL/L (ref 133–144)
TRIGL SERPL-MCNC: 122 MG/DL

## 2021-10-26 PROCEDURE — 99214 OFFICE O/P EST MOD 30 MIN: CPT | Performed by: NURSE PRACTITIONER

## 2021-10-26 PROCEDURE — 80048 BASIC METABOLIC PNL TOTAL CA: CPT | Performed by: NURSE PRACTITIONER

## 2021-10-26 PROCEDURE — 80061 LIPID PANEL: CPT | Performed by: NURSE PRACTITIONER

## 2021-10-26 PROCEDURE — 36415 COLL VENOUS BLD VENIPUNCTURE: CPT | Performed by: NURSE PRACTITIONER

## 2021-10-26 PROCEDURE — 84460 ALANINE AMINO (ALT) (SGPT): CPT | Performed by: NURSE PRACTITIONER

## 2021-10-26 RX ORDER — EZETIMIBE 10 MG/1
10 TABLET ORAL DAILY
Qty: 90 TABLET | Refills: 3 | Status: SHIPPED | OUTPATIENT
Start: 2021-10-26 | End: 2023-03-28

## 2021-10-26 RX ORDER — LOSARTAN POTASSIUM 50 MG/1
50 TABLET ORAL DAILY
Qty: 90 TABLET | Refills: 3 | Status: SHIPPED | OUTPATIENT
Start: 2021-10-26 | End: 2022-05-31

## 2021-10-26 ASSESSMENT — MIFFLIN-ST. JEOR: SCORE: 1039.89

## 2021-10-26 NOTE — LETTER
10/26/2021    Anne Randle MD  [address] [facility] [address]    RE: Ramila Dietrich       Dear Colleague,    I had the pleasure of seeing Ramila Dietrich in the [facility].    HISTORY OF PRESENT ILLNESS:    This is a 61 year old female who follows with Dr Thurston at [facility].   Her past medical history includes: hypertension, coronary artery disease, COPD, and  tobacco dependence     Ms Dietrich suffered acute coronary syndrome and underwent stenting to her proximal and distal RCA (3/24/21) A chest CT showed moderate emphysema  Her ECHO showed LVEF 55-60%, normal RV function, and no significant valvular pathology. She was placed on Brilinta, Atorvastatin, and Metoprolol  Her home Amlodipine was discontinued.     She was readmitted (4/6/21) with resting chest pain and ruled out for a MI. Coronary angiography was performed (4/6/21) which showed widely patent RCA stents and no significant obstructive CAD elsewhere.  Her chest pain was felt to likely be related to GERD and she was sent home on Pepcid.  Her Brilinta was changed to Plavix    She has since had ongoing hypertension and we have escalated her anti-hypertensive agents.  Hydrochlorothiazide was added a couple of months ago      Our visit today is for further review and labs    Ms Dietrich is a  at the [employer] and has no physical limitations while working. She stopped her hydrochlorothiazide shortly after starting it due to symptomatic hypotension. She states that her SBP was 76 mmHg  She then took Losartan 100 mg for a period of time, but again developed some SBPs < 90 mmHg  She went back to taking Losartan 50 mg tabs she had at home and states that her SBP was staying < 140 mmHg.  However, she ran out of them 4 days ago and has not had any losartan.  She denies any chest pain, shortness of breath, orthopnea, or peripheral edema.      I reviewed  her labs today:  Sodium: 135  Potassium: 4.2  BUN: 11  Creatinine: 0.91  Cholesterol: 181  Triglycerides: 122  HDL: 62  LDL: 95      VITAL SIGNS:  BP: 154/84  Pulse:  80  Weight: 115 lbs (BMI: 21)      IMPRESSION AND PLAN:    Coronary Artery Disease:  -s/p stenting x2 to her proximal and distal RCA (3/2021)  -cath (4/2021) showed widely patent stents and no other obstructive CAD  -Plavix for at least 1 yr    Hypertension:  -has only been taking Losartan 50 mg/day and Metoprolol 25 mg BID  -BP elevated today, but has ran out of her Losartan.  She is to resume Losartan 50 mg and return in 1 month  -consider changing Metoprolol to Coreg    Hyperlipidemia:  -on Crestor 40 mg  -LDL 95  -will add Zetia  -recheck lipids in 1 month    The total time for the visit today was 30 minutes which includes patient visit, reviewing of records, discussion, and placing of orders of the outpatient coordination of cardiovascular care as described.  The level of medical decision making during this visit was of moderate complexity.  Thank you for allowing me to participate in their care.    Orders Placed This Encounter   Procedures     Basic metabolic panel     Lipid Profile     ALT     Follow-Up with Cardiac Advanced Practice Provider       Orders Placed This Encounter   Medications     losartan (COZAAR) 50 MG tablet     Sig: Take 1 tablet (50 mg) by mouth daily     Dispense:  90 tablet     Refill:  3     ezetimibe (ZETIA) 10 MG tablet     Sig: Take 1 tablet (10 mg) by mouth daily     Dispense:  90 tablet     Refill:  3       Medications Discontinued During This Encounter   Medication Reason     hydrochlorothiazide (MICROZIDE) 12.5 MG capsule      losartan (COZAAR) 100 MG tablet      diphenhydrAMINE (BENADRYL) 25 MG tablet          Encounter Diagnoses   Name Primary?     Coronary artery disease involving native coronary artery of native heart without angina pectoris      Benign essential hypertension        CURRENT MEDICATIONS:  Current  "Outpatient Medications   Medication Sig Dispense Refill     albuterol (PROAIR HFA/PROVENTIL HFA/VENTOLIN HFA) 108 (90 Base) MCG/ACT inhaler Inhale 2 puffs into the lungs every 6 hours as needed for shortness of breath / dyspnea or wheezing       aspirin (ASA) 81 MG EC tablet Take 1 tablet (81 mg) by mouth daily 90 tablet 3     clopidogrel (PLAVIX) 75 MG tablet Take 1 tablet (75 mg) by mouth daily (Take in place of Brilinta) 90 tablet 1     ezetimibe (ZETIA) 10 MG tablet Take 1 tablet (10 mg) by mouth daily 90 tablet 3     famotidine (PEPCID) 20 MG tablet Take 1 tablet (20 mg) by mouth 2 times daily as needed (heartburn, chest discomfort) 60 tablet 1     losartan (COZAAR) 50 MG tablet Take 1 tablet (50 mg) by mouth daily 90 tablet 3     metoprolol tartrate (LOPRESSOR) 25 MG tablet Take 1 tablet (25 mg) by mouth 2 times daily 180 tablet 0     nitroGLYcerin (NITROSTAT) 0.4 MG sublingual tablet For chest pain place 1 tablet under the tongue every 5 minutes for 3 doses. If symptoms persist 5 minutes after 2nd dose call 911. 30 tablet 0     rosuvastatin (CRESTOR) 40 MG tablet Take 1 tablet (40 mg) by mouth daily 90 tablet 3       ALLERGIES     Allergies   Allergen Reactions     Erythromycin      Aminophylline      \"got jittery\"       PAST MEDICAL HISTORY:  Past Medical History:   Diagnosis Date     Acid reflux      Asthma      Hiatal hernia        PAST SURGICAL HISTORY:  Past Surgical History:   Procedure Laterality Date     APPENDECTOMY        SECTION      x5     CV CORONARY ANGIOGRAM N/A 3/26/2021    Procedure: Coronary Angiogram;  Surgeon: Madi Dye MD;  Location: [facility] CARDIAC CATH LAB     CV HEART CATHETERIZATION WITH POSSIBLE INTERVENTION N/A 2021    Procedure: coronary angiogram;  Surgeon: Madi Dye MD;  Location: [facility] CARDIAC CATH LAB     CV INSTANTANEOUS WAVE-FREE RATIO N/A 3/26/2021    Procedure: Instantaneous Wave-Free Ratio;  Surgeon: Madi Dye MD;  Location: [facility] " CARDIAC CATH LAB     CV PCI STENT DRUG ELUTING N/A 3/26/2021    Procedure: Percutaneous Coronary Intervention Stent Drug Eluting;  Surgeon: Madi Dye MD;  Location: [facility] CARDIAC CATH LAB     ORTHOPEDIC SURGERY       TONSILLECTOMY         FAMILY HISTORY:  Family History   Problem Relation Age of Onset     Heart Disease Mother          75yo [place] emphysema smoker     Cancer Father          85yo lung smoker     Family History Negative Sister 32        lung issue     Family History Negative Son      Family History Negative Son      Family History Negative Son      Family History Negative Daughter      Neurologic Disorder Daughter 23        MS     Cancer Maternal Grandfather          age? throat smoker       SOCIAL HISTORY:  Social History     Socioeconomic History     Marital status: Single     Spouse name: None     Number of children: 5     Years of education: None     Highest education level: None   Occupational History     Employer: [employer]   Tobacco Use     Smoking status: Current Every Day Smoker     Packs/day: 0.50     Years: 40.00     Pack years: 20.00     Smokeless tobacco: Never Used     Tobacco comment: Smoker since age 13. 1/2-3/4 ppd.   Substance and Sexual Activity     Alcohol use: No     Drug use: No     Sexual activity: Not Currently   Other Topics Concern     None   Social History Narrative     None     Social Determinants of Health     Financial Resource Strain:      Difficulty of Paying Living Expenses:    Food Insecurity:      Worried About Running Out of Food in the Last Year:      Ran Out of Food in the Last Year:    Transportation Needs:      Lack of Transportation (Medical):      Lack of Transportation (Non-Medical):    Physical Activity:      Days of Exercise per Week:      Minutes of Exercise per Session:    Stress:      Feeling of Stress :    Social Connections:      Frequency of Communication with Friends and Family:      Frequency of Social Gatherings  "with Friends and Family:      Attends Protestant Services:      Active Member of Clubs or Organizations:      Attends Club or Organization Meetings:      Marital Status:    Intimate Partner Violence:      Fear of Current or Ex-Partner:      Emotionally Abused:      Physically Abused:      Sexually Abused:        Review of Systems:  Skin:  Positive for bruising     Eyes:  Negative      ENT:  Negative      Respiratory:  Negative       Cardiovascular:  Negative      Gastroenterology: Positive for heartburn    Genitourinary:  not assessed      Musculoskeletal:  Negative      Neurologic:  Negative      Psychiatric:  Positive for anxiety    Heme/Lymph/Imm:  Negative      Endocrine:  Negative        Physical Exam:  Vitals: BP (!) 154/84   Pulse 80   Ht 1.575 m (5' 2\")   Wt 52.2 kg (115 lb)   LMP  (LMP Unknown)   BMI 21.03 kg/m      Constitutional:  cooperative thin      Skin:  warm and dry to the touch          Head:  normocephalic        Eyes:           Lymph:      ENT:  no pallor or cyanosis        Neck:  JVP normal;no carotid bruit        Respiratory:  clear to auscultation;normal respiratory excursion         Cardiac: regular rhythm;normal S1 and S2     no presence of murmur          pulses full and equal                                        GI:  abdomen soft        Extremities and Muscular Skeletal:  no edema              Neurological:  affect appropriate        Psych:  Alert and Oriented x 3          CC  Cameron Thurston MD  [address]  [place] [address]                      Thank you for allowing me to participate in the care of your patient.      Sincerely,     ANGÉLICA [facility]  cc:   Cameron Thurston MD  [address]  [place] [address]        "

## 2022-01-01 NOTE — ED NOTES
Patient stated that she wants to take her nitroglycerin instead of the hospital supplied medication. MD aware.   No

## 2022-03-31 ENCOUNTER — TELEPHONE (OUTPATIENT)
Dept: CARDIOLOGY | Facility: CLINIC | Age: 62
End: 2022-03-31
Payer: COMMERCIAL

## 2022-03-31 DIAGNOSIS — I20.0 UNSTABLE ANGINA (H): ICD-10-CM

## 2022-03-31 RX ORDER — METOPROLOL TARTRATE 25 MG/1
25 TABLET, FILM COATED ORAL 2 TIMES DAILY
Qty: 180 TABLET | Refills: 3 | Status: SHIPPED | OUTPATIENT
Start: 2022-03-31 | End: 2023-08-21

## 2022-04-01 DIAGNOSIS — R07.89 OTHER CHEST PAIN: ICD-10-CM

## 2022-04-01 RX ORDER — CLOPIDOGREL BISULFATE 75 MG/1
75 TABLET ORAL DAILY
Qty: 90 TABLET | Refills: 0 | Status: SHIPPED | OUTPATIENT
Start: 2022-04-01 | End: 2022-05-31

## 2022-04-18 ENCOUNTER — HOSPITAL ENCOUNTER (EMERGENCY)
Facility: CLINIC | Age: 62
Discharge: HOME | End: 2022-04-18
Attending: EMERGENCY MEDICINE | Admitting: EMERGENCY MEDICINE
Payer: COMMERCIAL

## 2022-04-18 VITALS
SYSTOLIC BLOOD PRESSURE: 196 MMHG | TEMPERATURE: 97.7 F | DIASTOLIC BLOOD PRESSURE: 118 MMHG | OXYGEN SATURATION: 97 % | HEART RATE: 76 BPM | RESPIRATION RATE: 18 BRPM

## 2022-04-18 DIAGNOSIS — M54.6 ACUTE BILATERAL THORACIC BACK PAIN: ICD-10-CM

## 2022-04-18 PROCEDURE — 250N000013 HC RX MED GY IP 250 OP 250 PS 637: Performed by: EMERGENCY MEDICINE

## 2022-04-18 PROCEDURE — 99284 EMERGENCY DEPT VISIT MOD MDM: CPT | Mod: 25

## 2022-04-18 PROCEDURE — 96372 THER/PROPH/DIAG INJ SC/IM: CPT | Performed by: EMERGENCY MEDICINE

## 2022-04-18 PROCEDURE — 250N000011 HC RX IP 250 OP 636: Performed by: EMERGENCY MEDICINE

## 2022-04-18 RX ORDER — CYCLOBENZAPRINE HCL 10 MG
10 TABLET ORAL ONCE
Status: COMPLETED | OUTPATIENT
Start: 2022-04-18 | End: 2022-04-18

## 2022-04-18 RX ORDER — METHYLPREDNISOLONE 4 MG
4 TABLET, DOSE PACK ORAL SEE ADMIN INSTRUCTIONS
Qty: 1 TABLET | Refills: 0 | Status: SHIPPED | OUTPATIENT
Start: 2022-04-18 | End: 2022-05-31

## 2022-04-18 RX ORDER — CYCLOBENZAPRINE HCL 10 MG
5-10 TABLET ORAL 3 TIMES DAILY PRN
Qty: 20 TABLET | Refills: 0 | Status: SHIPPED | OUTPATIENT
Start: 2022-04-18 | End: 2023-02-10

## 2022-04-18 RX ORDER — METHYLPREDNISOLONE 4 MG
4 TABLET, DOSE PACK ORAL SEE ADMIN INSTRUCTIONS
Qty: 1 TABLET | Refills: 0 | Status: SHIPPED | OUTPATIENT
Start: 2022-04-18 | End: 2022-04-18

## 2022-04-18 RX ORDER — CYCLOBENZAPRINE HCL 10 MG
5-10 TABLET ORAL 3 TIMES DAILY PRN
Qty: 20 TABLET | Refills: 0 | Status: SHIPPED | OUTPATIENT
Start: 2022-04-18 | End: 2022-04-18

## 2022-04-18 RX ORDER — KETOROLAC TROMETHAMINE 15 MG/ML
15 INJECTION, SOLUTION INTRAMUSCULAR; INTRAVENOUS ONCE
Status: COMPLETED | OUTPATIENT
Start: 2022-04-18 | End: 2022-04-18

## 2022-04-18 RX ADMIN — CYCLOBENZAPRINE HYDROCHLORIDE 10 MG: 10 TABLET, FILM COATED ORAL at 23:14

## 2022-04-18 RX ADMIN — KETOROLAC TROMETHAMINE 15 MG: 15 INJECTION, SOLUTION INTRAMUSCULAR; INTRAVENOUS at 23:14

## 2022-04-19 ASSESSMENT — ENCOUNTER SYMPTOMS
BACK PAIN: 1
WEAKNESS: 0
NUMBNESS: 0
NECK PAIN: 0
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
## 2022-04-21 DIAGNOSIS — I20.0 UNSTABLE ANGINA (H): ICD-10-CM

## 2022-04-22 NOTE — TELEPHONE ENCOUNTER
Routing refill request to provider for review/approval because:  Patient needs to be seen because:

## 2022-04-22 NOTE — TELEPHONE ENCOUNTER
I have not seen this patient since more than a year, patient has been seeing cardiology and please forward request to cardiology

## 2022-04-25 RX ORDER — ASPIRIN 81 MG/1
TABLET, COATED ORAL
Qty: 90 TABLET | Refills: 0 | Status: SHIPPED | OUTPATIENT
Start: 2022-04-25 | End: 2022-08-02

## 2022-05-30 NOTE — PROGRESS NOTES
HISTORY OF PRESENT ILLNESS:     This is a 62 year old female who follows with Dr Thurston at [facility].   Her past medical history includes: hypertension, coronary artery disease, COPD, degenerative disc disease, and  tobacco dependence     Ms Dietrich suffered acute coronary syndrome and underwent stenting to her proximal and distal RCA (3/24/21) A chest CT showed moderate emphysema  Her ECHO showed LVEF 55-60%, normal RV function, and no significant valvular pathology.     She was readmitted (4/6/21) with resting chest pain and ruled out for a MI. Repeat coronary angiography (4/6/21) showed widely patent RCA stents and no significant obstructive CAD elsewhere.  Her chest pain was treated with Pepcid.     Last year we escalated her medications due to ongoing hypertension.  She did not tolerate hydrochlorothiazide due to symptomatic hypotension          Our visit today is for further review and labs    Ms Dietrich is very active at her job working at the [employer]  She denies any chest pain or significant shortness of breath  She is still smoking 3/4 pack of cigarettes a day  We talked about ways to quit smoking.  She denies palpitations, orthopnea, or peripheral edema.  She checks her BP at home and states that her SBP runs 130-140 mmHg on a regular basis             I reviewed labs today  Sodium: 135  Potassium: 4. 4  BUN: 15  Creatinine: 0.92  Total Cholesterol: 121  Triglycerides: 131  HDL: 56  LDL: 39  ALT: 14        VITAL SIGNS:  BP: 156/80  Pulse:  80  Weight:  116 lbs (stable)         IMPRESSION AND PLAN:     Coronary Artery Disease:  -s/p stenting x2 to her proximal and distal RCA (3/2021)  -cath (4/2021) showed widely patent stents and no other obstructive CAD  -denies angina  -ASA indefinitely  Has completed a year of Plavix     Hypertension:  -on Losartan 50 mg/day and Metoprolol 25 mg BID  -BP elevated  -will increase Losartan to 50 mg twice a day  -return in 1 month for BP check with the  RN     Hyperlipidemia:  -on Crestor 40 mg, Zetia  -LDL  39    Tobacco Dependence  -encouraged complete cessation    The total time for the visit today was 30 minutes which includes patient visit, reviewing of records, discussion, and placing of orders of the outpatient coordination of cardiovascular care as described.  The level of medical decision making during this visit was of moderate complexity.  Thank you for allowing me to participate in their care.    Orders Placed This Encounter   Procedures     Basic metabolic panel     Basic metabolic panel     Lipid Profile     ALT     Follow-Up with Cardiology Nurse     Follow-Up with Cardiology     Follow-Up with Cardiology       Orders Placed This Encounter   Medications     losartan (COZAAR) 50 MG tablet     Sig: Take 1 tablet (50 mg) by mouth 2 times daily     Dispense:  180 tablet     Refill:  3     rosuvastatin (CRESTOR) 40 MG tablet     Sig: Take 1 tablet (40 mg) by mouth daily     Dispense:  90 tablet     Refill:  3       Medications Discontinued During This Encounter   Medication Reason     methylPREDNISolone (MEDROL DOSEPAK) 4 MG tablet therapy pack Medication Reconciliation Clean Up     clopidogrel (PLAVIX) 75 MG tablet      losartan (COZAAR) 50 MG tablet      rosuvastatin (CRESTOR) 40 MG tablet Reorder         Encounter Diagnoses   Name Primary?     Coronary artery disease involving native coronary artery of native heart without angina pectoris      Benign essential hypertension        CURRENT MEDICATIONS:  Current Outpatient Medications   Medication Sig Dispense Refill     albuterol (PROAIR HFA/PROVENTIL HFA/VENTOLIN HFA) 108 (90 Base) MCG/ACT inhaler Inhale 2 puffs into the lungs every 6 hours as needed for shortness of breath / dyspnea or wheezing       ASPIRIN LOW DOSE 81 MG EC tablet Take 1 tablet (81 mg) by mouth daily 90 tablet 0     cyclobenzaprine (FLEXERIL) 10 MG tablet Take 0.5-1 tablets (5-10 mg) by mouth 3 times daily as needed for muscle spasms  "20 tablet 0     ezetimibe (ZETIA) 10 MG tablet Take 1 tablet (10 mg) by mouth daily 90 tablet 3     famotidine (PEPCID) 20 MG tablet Take 1 tablet (20 mg) by mouth 2 times daily as needed (heartburn, chest discomfort) 60 tablet 1     losartan (COZAAR) 50 MG tablet Take 1 tablet (50 mg) by mouth 2 times daily 180 tablet 3     metoprolol tartrate (LOPRESSOR) 25 MG tablet Take 1 tablet (25 mg) by mouth 2 times daily 180 tablet 3     nitroGLYcerin (NITROSTAT) 0.4 MG sublingual tablet For chest pain place 1 tablet under the tongue every 5 minutes for 3 doses. If symptoms persist 5 minutes after 2nd dose call 911. 30 tablet 0     rosuvastatin (CRESTOR) 40 MG tablet Take 1 tablet (40 mg) by mouth daily 90 tablet 3       ALLERGIES     Allergies   Allergen Reactions     Erythromycin      Aminophylline      \"got jittery\"       PAST MEDICAL HISTORY:  Past Medical History:   Diagnosis Date     Acid reflux      Asthma      Hiatal hernia        PAST SURGICAL HISTORY:  Past Surgical History:   Procedure Laterality Date     APPENDECTOMY        SECTION      x5     CV CORONARY ANGIOGRAM N/A 3/26/2021    Procedure: Coronary Angiogram;  Surgeon: Madi Dye MD;  Location:  HEART CARDIAC CATH LAB     CV HEART CATHETERIZATION WITH POSSIBLE INTERVENTION N/A 2021    Procedure: coronary angiogram;  Surgeon: Madi Dye MD;  Location:  HEART CARDIAC CATH LAB     CV INSTANTANEOUS WAVE-FREE RATIO N/A 3/26/2021    Procedure: Instantaneous Wave-Free Ratio;  Surgeon: Madi Dye MD;  Location:  HEART CARDIAC CATH LAB     CV PCI STENT DRUG ELUTING N/A 3/26/2021    Procedure: Percutaneous Coronary Intervention Stent Drug Eluting;  Surgeon: Madi Dye MD;  Location:  HEART CARDIAC CATH LAB     ORTHOPEDIC SURGERY       TONSILLECTOMY         FAMILY HISTORY:  Family History   Problem Relation Age of Onset     Heart Disease Mother          75yo [place] emphysema smoker     Cancer Father          " "85yo lung smoker     Family History Negative Sister 32        lung issue     Family History Negative Son      Family History Negative Son      Family History Negative Son      Family History Negative Daughter      Neurologic Disorder Daughter 23        MS     Cancer Maternal Grandfather          age? throat smoker       SOCIAL HISTORY:  Social History     Socioeconomic History     Marital status: Single     Spouse name: None     Number of children: 5     Years of education: None     Highest education level: None   Occupational History     Employer: [employer]   Tobacco Use     Smoking status: Current Every Day Smoker     Packs/day: 0.50     Years: 40.00     Pack years: 20.00     Smokeless tobacco: Never Used     Tobacco comment: Smoker since age 13. 1/2-3/4 ppd.   Substance and Sexual Activity     Alcohol use: No     Drug use: No     Sexual activity: Not Currently       Review of Systems:  Skin:  Negative       Eyes:  Negative      ENT:  Negative      Respiratory:  Negative       Cardiovascular:  Negative      Gastroenterology: Negative      Genitourinary:  Negative      Musculoskeletal:  Negative      Neurologic:  Negative      Psychiatric:  Negative      Heme/Lymph/Imm:  Negative      Endocrine:  Negative        Physical Exam:  Vitals: BP (!) 156/80 (BP Location: Right arm, Patient Position: Sitting, Cuff Size: Adult Small)   Pulse 80   Ht 1.575 m (5' 2\")   Wt 52.8 kg (116 lb 4.8 oz)   LMP  (LMP Unknown)   SpO2 97%   Breastfeeding No   BMI 21.27 kg/m      Constitutional:  cooperative thin      Skin:  warm and dry to the touch          Head:  normocephalic        Eyes:  pupils equal and round        Lymph:      ENT:  no pallor or cyanosis        Neck:  JVP normal;no carotid bruit        Respiratory:  clear to auscultation;normal respiratory excursion         Cardiac: regular rhythm;normal S1 and S2     no presence of murmur          pulses full and equal                                        GI:  " abdomen soft        Extremities and Muscular Skeletal:  no edema              Neurological:  affect appropriate        Psych:  Alert and Oriented x 3          CC  ANGÉLICA Brannon CNP  [id number]  [place] [id number]

## 2022-05-31 ENCOUNTER — OFFICE VISIT (OUTPATIENT)
Dept: CARDIOLOGY | Facility: CLINIC | Age: 62
End: 2022-05-31
Attending: NURSE PRACTITIONER

## 2022-05-31 ENCOUNTER — LAB (OUTPATIENT)
Dept: LAB | Facility: CLINIC | Age: 62
End: 2022-05-31
Payer: COMMERCIAL

## 2022-05-31 VITALS
BODY MASS INDEX: 21.4 KG/M2 | HEIGHT: 62 IN | HEART RATE: 80 BPM | SYSTOLIC BLOOD PRESSURE: 156 MMHG | OXYGEN SATURATION: 97 % | DIASTOLIC BLOOD PRESSURE: 80 MMHG | WEIGHT: 116.3 LBS

## 2022-05-31 DIAGNOSIS — I25.10 CORONARY ARTERY DISEASE INVOLVING NATIVE CORONARY ARTERY OF NATIVE HEART WITHOUT ANGINA PECTORIS: ICD-10-CM

## 2022-05-31 DIAGNOSIS — I10 BENIGN ESSENTIAL HYPERTENSION: ICD-10-CM

## 2022-05-31 LAB
ALT SERPL W P-5'-P-CCNC: 14 U/L (ref 0–50)
ANION GAP SERPL CALCULATED.3IONS-SCNC: 2 MMOL/L (ref 3–14)
BUN SERPL-MCNC: 15 MG/DL (ref 7–30)
CALCIUM SERPL-MCNC: 9.3 MG/DL (ref 8.5–10.1)
CHLORIDE BLD-SCNC: 107 MMOL/L (ref 94–109)
CHOLEST SERPL-MCNC: 121 MG/DL
CO2 SERPL-SCNC: 26 MMOL/L (ref 20–32)
CREAT SERPL-MCNC: 0.92 MG/DL (ref 0.52–1.04)
FASTING STATUS PATIENT QL REPORTED: YES
GFR SERPL CREATININE-BSD FRML MDRD: 70 ML/MIN/1.73M2
GLUCOSE BLD-MCNC: 91 MG/DL (ref 70–99)
HDLC SERPL-MCNC: 56 MG/DL
LDLC SERPL CALC-MCNC: 39 MG/DL
NONHDLC SERPL-MCNC: 65 MG/DL
POTASSIUM BLD-SCNC: 4.4 MMOL/L (ref 3.4–5.3)
SODIUM SERPL-SCNC: 135 MMOL/L (ref 133–144)
TRIGL SERPL-MCNC: 131 MG/DL

## 2022-05-31 PROCEDURE — 36415 COLL VENOUS BLD VENIPUNCTURE: CPT | Performed by: NURSE PRACTITIONER

## 2022-05-31 PROCEDURE — 80048 BASIC METABOLIC PNL TOTAL CA: CPT | Performed by: NURSE PRACTITIONER

## 2022-05-31 PROCEDURE — 99214 OFFICE O/P EST MOD 30 MIN: CPT | Performed by: NURSE PRACTITIONER

## 2022-05-31 PROCEDURE — 80061 LIPID PANEL: CPT | Performed by: NURSE PRACTITIONER

## 2022-05-31 PROCEDURE — 84460 ALANINE AMINO (ALT) (SGPT): CPT | Performed by: NURSE PRACTITIONER

## 2022-05-31 RX ORDER — LOSARTAN POTASSIUM 50 MG/1
50 TABLET ORAL 2 TIMES DAILY
Qty: 180 TABLET | Refills: 3 | Status: SHIPPED | OUTPATIENT
Start: 2022-05-31 | End: 2023-10-09

## 2022-05-31 RX ORDER — ROSUVASTATIN CALCIUM 40 MG/1
40 TABLET, COATED ORAL DAILY
Qty: 90 TABLET | Refills: 3 | Status: SHIPPED | OUTPATIENT
Start: 2022-05-31 | End: 2023-07-24

## 2022-05-31 NOTE — LETTER
5/31/2022    [facility]  [address] [place] [address]    RE: Ramila Gerardo       Dear Colleague,     I had the pleasure of seeing Ramila Dietrich in the [facility].  HISTORY OF PRESENT ILLNESS:     This is a 62 year old female who follows with Dr Thurston at [facility].   Her past medical history includes: hypertension, coronary artery disease, COPD, degenerative disc disease, and  tobacco dependence     Ms Dietrich suffered acute coronary syndrome and underwent stenting to her proximal and distal RCA (3/24/21) A chest CT showed moderate emphysema  Her ECHO showed LVEF 55-60%, normal RV function, and no significant valvular pathology.     She was readmitted (4/6/21) with resting chest pain and ruled out for a MI. Repeat coronary angiography (4/6/21) showed widely patent RCA stents and no significant obstructive CAD elsewhere.  Her chest pain was treated with Pepcid.     Last year we escalated her medications due to ongoing hypertension.  She did not tolerate hydrochlorothiazide due to symptomatic hypotension          Our visit today is for further review and labs    Ms Dietrich is very active at her job working at the [employer]  She denies any chest pain or significant shortness of breath  She is still smoking 3/4 pack of cigarettes a day  We talked about ways to quit smoking.  She denies palpitations, orthopnea, or peripheral edema.  She checks her BP at home and states that her SBP runs 130-140 mmHg on a regular basis             I reviewed labs today  Sodium: 135  Potassium: 4. 4  BUN: 15  Creatinine: 0.92  Total Cholesterol: 121  Triglycerides: 131  HDL: 56  LDL: 39  ALT: 14        VITAL SIGNS:  BP: 156/80  Pulse:  80  Weight:  116 lbs (stable)         IMPRESSION AND PLAN:     Coronary Artery Disease:  -s/p stenting x2 to her proximal and distal RCA (3/2021)  -cath (4/2021) showed widely patent stents and no other obstructive CAD  -denies angina  -ASA  indefinitely  Has completed a year of Plavix     Hypertension:  -on Losartan 50 mg/day and Metoprolol 25 mg BID  -BP elevated  -will increase Losartan to 50 mg twice a day  -return in 1 month for BP check with the RN     Hyperlipidemia:  -on Crestor 40 mg, Zetia  -LDL  39    Tobacco Dependence  -encouraged complete cessation    The total time for the visit today was 30 minutes which includes patient visit, reviewing of records, discussion, and placing of orders of the outpatient coordination of cardiovascular care as described.  The level of medical decision making during this visit was of moderate complexity.  Thank you for allowing me to participate in their care.    Orders Placed This Encounter   Procedures     Basic metabolic panel     Basic metabolic panel     Lipid Profile     ALT     Follow-Up with Cardiology Nurse     Follow-Up with Cardiology     Follow-Up with Cardiology       Orders Placed This Encounter   Medications     losartan (COZAAR) 50 MG tablet     Sig: Take 1 tablet (50 mg) by mouth 2 times daily     Dispense:  180 tablet     Refill:  3     rosuvastatin (CRESTOR) 40 MG tablet     Sig: Take 1 tablet (40 mg) by mouth daily     Dispense:  90 tablet     Refill:  3       Medications Discontinued During This Encounter   Medication Reason     methylPREDNISolone (MEDROL DOSEPAK) 4 MG tablet therapy pack Medication Reconciliation Clean Up     clopidogrel (PLAVIX) 75 MG tablet      losartan (COZAAR) 50 MG tablet      rosuvastatin (CRESTOR) 40 MG tablet Reorder         Encounter Diagnoses   Name Primary?     Coronary artery disease involving native coronary artery of native heart without angina pectoris      Benign essential hypertension        CURRENT MEDICATIONS:  Current Outpatient Medications   Medication Sig Dispense Refill     albuterol (PROAIR HFA/PROVENTIL HFA/VENTOLIN HFA) 108 (90 Base) MCG/ACT inhaler Inhale 2 puffs into the lungs every 6 hours as needed for shortness of breath / dyspnea or  "wheezing       ASPIRIN LOW DOSE 81 MG EC tablet Take 1 tablet (81 mg) by mouth daily 90 tablet 0     cyclobenzaprine (FLEXERIL) 10 MG tablet Take 0.5-1 tablets (5-10 mg) by mouth 3 times daily as needed for muscle spasms 20 tablet 0     ezetimibe (ZETIA) 10 MG tablet Take 1 tablet (10 mg) by mouth daily 90 tablet 3     famotidine (PEPCID) 20 MG tablet Take 1 tablet (20 mg) by mouth 2 times daily as needed (heartburn, chest discomfort) 60 tablet 1     losartan (COZAAR) 50 MG tablet Take 1 tablet (50 mg) by mouth 2 times daily 180 tablet 3     metoprolol tartrate (LOPRESSOR) 25 MG tablet Take 1 tablet (25 mg) by mouth 2 times daily 180 tablet 3     nitroGLYcerin (NITROSTAT) 0.4 MG sublingual tablet For chest pain place 1 tablet under the tongue every 5 minutes for 3 doses. If symptoms persist 5 minutes after 2nd dose call 911. 30 tablet 0     rosuvastatin (CRESTOR) 40 MG tablet Take 1 tablet (40 mg) by mouth daily 90 tablet 3       ALLERGIES     Allergies   Allergen Reactions     Erythromycin      Aminophylline      \"got jittery\"       PAST MEDICAL HISTORY:  Past Medical History:   Diagnosis Date     Acid reflux      Asthma      Hiatal hernia        PAST SURGICAL HISTORY:  Past Surgical History:   Procedure Laterality Date     APPENDECTOMY        SECTION      x5     CV CORONARY ANGIOGRAM N/A 3/26/2021    Procedure: Coronary Angiogram;  Surgeon: Madi Dye MD;  Location:  HEART CARDIAC CATH LAB     CV HEART CATHETERIZATION WITH POSSIBLE INTERVENTION N/A 2021    Procedure: coronary angiogram;  Surgeon: Madi Dye MD;  Location:  HEART CARDIAC CATH LAB     CV INSTANTANEOUS WAVE-FREE RATIO N/A 3/26/2021    Procedure: Instantaneous Wave-Free Ratio;  Surgeon: Madi Dye MD;  Location:  HEART CARDIAC CATH LAB     CV PCI STENT DRUG ELUTING N/A 3/26/2021    Procedure: Percutaneous Coronary Intervention Stent Drug Eluting;  Surgeon: Madi Dye MD;  Location:  HEART CARDIAC CATH " "LAB     ORTHOPEDIC SURGERY       TONSILLECTOMY         FAMILY HISTORY:  Family History   Problem Relation Age of Onset     Heart Disease Mother          75yo [place] emphysema smoker     Cancer Father          87yo lung smoker     Family History Negative Sister 32        lung issue     Family History Negative Son      Family History Negative Son      Family History Negative Son      Family History Negative Daughter      Neurologic Disorder Daughter 23        MS     Cancer Maternal Grandfather          age? throat smoker       SOCIAL HISTORY:  Social History     Socioeconomic History     Marital status: Single     Spouse name: None     Number of children: 5     Years of education: None     Highest education level: None   Occupational History     Employer: [employer]   Tobacco Use     Smoking status: Current Every Day Smoker     Packs/day: 0.50     Years: 40.00     Pack years: 20.00     Smokeless tobacco: Never Used     Tobacco comment: Smoker since age 13. 1/2-3/4 ppd.   Substance and Sexual Activity     Alcohol use: No     Drug use: No     Sexual activity: Not Currently       Review of Systems:  Skin:  Negative       Eyes:  Negative      ENT:  Negative      Respiratory:  Negative       Cardiovascular:  Negative      Gastroenterology: Negative      Genitourinary:  Negative      Musculoskeletal:  Negative      Neurologic:  Negative      Psychiatric:  Negative      Heme/Lymph/Imm:  Negative      Endocrine:  Negative        Physical Exam:  Vitals: BP (!) 156/80 (BP Location: Right arm, Patient Position: Sitting, Cuff Size: Adult Small)   Pulse 80   Ht 1.575 m (5' 2\")   Wt 52.8 kg (116 lb 4.8 oz)   LMP  (LMP Unknown)   SpO2 97%   Breastfeeding No   BMI 21.27 kg/m      Constitutional:  cooperative thin      Skin:  warm and dry to the touch          Head:  normocephalic        Eyes:  pupils equal and round        Lymph:      ENT:  no pallor or cyanosis        Neck:  JVP normal;no carotid bruit  "       Respiratory:  clear to auscultation;normal respiratory excursion         Cardiac: regular rhythm;normal S1 and S2     no presence of murmur          pulses full and equal                                        GI:  abdomen soft        Extremities and Muscular Skeletal:  no edema              Neurological:  affect appropriate        Psych:  Alert and Oriented x 3          CC  ANGÉLICA Brannon CNP  [address]  [place] [address]    Thank you for allowing me to participate in the care of your patient.      Sincerely,     ANGÉLICA Nice CNP     [facility]

## 2022-07-25 ENCOUNTER — TELEPHONE (OUTPATIENT)
Dept: CARDIOLOGY | Facility: CLINIC | Age: 62
End: 2022-07-25

## 2022-07-25 NOTE — TELEPHONE ENCOUNTER
Called patient to review recent elevated blood pressure reading.  Per [place] Community Me[place] guidelines, patients blood pressure is out of parameters and recheck blood pressure is recommended.    Last Blood Pressure: 156/80  Last Heart Rate: 39   Date: 5/31/22  Location: [facility]    Today's Blood Pressure: 130/80  Today's Heart Rate:  Location: Home BP    Patient reported blood pressure updated in Epic. Blood pressure falls within [place] Community Me[place] guidelines.  Patient will follow up as previously advised.     7/25/22 @ 1:56 TLS

## 2022-08-02 DIAGNOSIS — I20.0 UNSTABLE ANGINA (H): ICD-10-CM

## 2022-08-02 NOTE — TELEPHONE ENCOUNTER
Received refill request for:  ASA   Last OV was: 5/31/22 CHATA Goode  Labs/EKG: N/a  F/U scheduled: No future appointments. Orders for 2/2023.  Pharmacy: [facility] Refill Guideline reviewed.  Medication meets criteria for refill.    Mayela Benitez RN, BSN  08/02/22 at 2:20 PM

## 2022-08-27 ENCOUNTER — HOSPITAL ENCOUNTER (EMERGENCY)
Facility: CLINIC | Age: 62
Discharge: HOME | End: 2022-08-27
Attending: EMERGENCY MEDICINE | Admitting: EMERGENCY MEDICINE
Payer: COMMERCIAL

## 2022-08-27 VITALS
TEMPERATURE: 97.4 F | DIASTOLIC BLOOD PRESSURE: 83 MMHG | HEART RATE: 78 BPM | BODY MASS INDEX: 20.12 KG/M2 | OXYGEN SATURATION: 98 % | SYSTOLIC BLOOD PRESSURE: 184 MMHG | RESPIRATION RATE: 18 BRPM | WEIGHT: 110 LBS

## 2022-08-27 DIAGNOSIS — M54.6 ACUTE MIDLINE THORACIC BACK PAIN: ICD-10-CM

## 2022-08-27 PROCEDURE — 250N000013 HC RX MED GY IP 250 OP 250 PS 637: Performed by: EMERGENCY MEDICINE

## 2022-08-27 PROCEDURE — 99284 EMERGENCY DEPT VISIT MOD MDM: CPT

## 2022-08-27 RX ORDER — METHOCARBAMOL 750 MG/1
750-1500 TABLET, FILM COATED ORAL 3 TIMES DAILY PRN
Qty: 30 TABLET | Refills: 0 | Status: SHIPPED | OUTPATIENT
Start: 2022-08-27 | End: 2022-09-01

## 2022-08-27 RX ORDER — IBUPROFEN 600 MG/1
600 TABLET, FILM COATED ORAL EVERY 6 HOURS PRN
Qty: 30 TABLET | Refills: 0 | Status: SHIPPED | OUTPATIENT
Start: 2022-08-27

## 2022-08-27 RX ORDER — HYDROCODONE BITARTRATE AND ACETAMINOPHEN 5; 325 MG/1; MG/1
1 TABLET ORAL ONCE
Status: COMPLETED | OUTPATIENT
Start: 2022-08-27 | End: 2022-08-27

## 2022-08-27 RX ORDER — IBUPROFEN 800 MG/1
800 TABLET, FILM COATED ORAL ONCE
Status: COMPLETED | OUTPATIENT
Start: 2022-08-27 | End: 2022-08-27

## 2022-08-27 RX ORDER — METHOCARBAMOL 500 MG/1
1000 TABLET, FILM COATED ORAL ONCE
Status: COMPLETED | OUTPATIENT
Start: 2022-08-27 | End: 2022-08-27

## 2022-08-27 RX ADMIN — IBUPROFEN 800 MG: 800 TABLET, FILM COATED ORAL at 20:23

## 2022-08-27 RX ADMIN — HYDROCODONE BITARTRATE AND ACETAMINOPHEN 1 TABLET: 5; 325 TABLET ORAL at 20:24

## 2022-08-27 RX ADMIN — METHOCARBAMOL 1000 MG: 500 TABLET ORAL at 20:26

## 2022-08-27 ASSESSMENT — ENCOUNTER SYMPTOMS
SHORTNESS OF BREATH: 0
FEVER: 0
BACK PAIN: 1

## 2022-08-27 ASSESSMENT — ACTIVITIES OF DAILY LIVING (ADL): ADLS_ACUITY_SCORE: 35

## 2022-08-28 NOTE — ED NOTES
Agree with triage- patient ambulated back from triage area. Patient states pain is localized to mid-back, states that the muscle spasms are her main complaint currently

## 2022-08-28 NOTE — ED PROVIDER NOTES
History   Chief Complaint:  Back Pain     The history is provided by the patient.      Ramila Dietrich is a 62 year old female who presents with her boyfriend for back pain. Patient reports to have had back spasms since working at the [place] [employer], where she works 14 hours every single day. The pain is in her mid-upper back and is constant. Patient notes the pain is similar to her previous bulging disc. Her pain is a 10/10, worse with movement as she states. She has taken both Flexeril and extra strength Tylenol at 0800 and at 1330 today with no improvements. She reports to have had tingling in her lower extremities. However, she denies having any chest pain, fevers, or shortness of breath. Nor has she had any falls or LOC.     Review of Systems   Constitutional: Negative for fever.   Respiratory: Negative for shortness of breath.    Cardiovascular: Negative for chest pain.   Musculoskeletal: Positive for back pain.   Neurological:        POS: Tingling   All other systems reviewed and are negative.    Allergies:  Erythromycin  Aminophylline    Medications:  albuterol   aspirin   cyclobenzaprine   ezetimibe  famotidine   losartan   metoprolol tartrate   nitroGLYcerin  rosuvastatin     Past Medical History:     Pain in shoulder  Hemorrhagic colitis  Mild intermittent asthma  Tobacco abuse  Gastroenteritis  Unstable angina  Chest pain  Acute chest pain  Coronary artery disease involving native heart with unstable angina pectoris, unspecified vessel or lesion type   Hiatal hernia  Leukocytosis    Past Surgical History:    Appendectomy   section (x3)  CV coronary angiogram  CV heart catheterization with possible intervention  CV instantaneous wave-free ratio  CV PCI stent drug eluting  Orthopedic surgery  Tonsillectomy     Family History:    Mother: Heart Disease  Father: Cancer  Daughter: MS    Social History:  The patient presents to the ED with her boyfriend.  Patient works at the [place] [employer].    Physical  Exam     Patient Vitals for the past 24 hrs:   BP Temp Temp src Pulse Resp SpO2 Weight   22 -- -- -- -- -- 98 % --   22 -- -- -- -- -- 99 % --   22 (!) 184/83 -- -- 78 -- 99 % --   22 -- 97.4  F (36.3  C) Temporal -- -- -- 49.9 kg (110 lb)   22 (!) 166/83 -- -- 88 18 100 % --     Physical Exam  Constitutional: Well developed, somewhat uncomfortable, nontox appearance  Head: Atraumatic.   Neck:  no stridor  Eyes: no scleral icterus  Cardiovascular: RRR, 2+ bilat DP pulses  Pulmonary/Chest: nml resp effort  Abdominal: ND, soft, NT, no rebound or guarding   Back: No discrete T or L spinous process tenderness  Ext: Warm, well perfused, no edema  Neurological: A&O, symmetric facies, moves ext x4, 5/5 strength throughout BLE, sensation grossly intact  Skin: Skin is warm and dry.   Psychiatric: Behavior is normal. Thought content normal.   Nursing note and vitals reviewed.    Emergency Department Course     Laboratory:  Labs Ordered and Resulted from Time of ED Arrival to Time of ED Departure - No data to display     Emergency Department Course:       Reviewed:  I reviewed nursing notes, vitals, past medical history, Care Everywhere and [facility]    Assessments:   I obtained history and examined the patient as noted above.    I rechecked the patient and explained findings.    Interventions:  Medications   methocarbamol (ROBAXIN) tablet 1,000 mg (1,000 mg Oral Given 22)   HYDROcodone-acetaminophen (NORCO) 5-325 MG per tablet 1 tablet (1 tablet Oral Given 22)   ibuprofen (ADVIL/MOTRIN) tablet 800 mg (800 mg Oral Given 22)     Disposition:  The patient was discharged to home.     Impression & Plan     CMS Diagnoses: None.    Medical Decision Makin year old female presenting w/ back pain     The patient presented with nonradiating midline back pain.  Given the patient's description of symptoms, presentation seems most consistent with  muscle spasms/strain likely incited by her work week.  The pain has improved with interventions in the emergency department. The patient did not sustain any trauma, therefore x-rays are not necessary due to the low likelihood of fracture or subluxation. Advanced imaging with CT/MRI is not indicated at this time, but may be indicated in the future if symptoms fail to resolve.  The patient has not had a fever, saddle/perineal anesthesia, bilateral foot numbness, or bowel or bladder dysfunction.  There is no clinical evidence of cauda equina syndrome, discitis, spinal/epidural space hematoma or abscess. The neurological exam is normal.  Physical therapy referral placed prior to discharge.At this time I feel the pt is safe for discharge.  Recommendations given regarding follow up with PCP and return to the emergency department as needed for new or worsening symptoms.  Pt counseled on all results, disposition and diagnosis.  They are understanding and agreeable to plan. Patient discharged in improved condition.      Diagnosis:    ICD-10-CM    1. Acute midline thoracic back pain  M54.6 Physical Therapy Referral     Discharge Medications:  New Prescriptions    IBUPROFEN (ADVIL/MOTRIN) 600 MG TABLET    Take 1 tablet (600 mg) by mouth every 6 hours as needed for moderate pain    METHOCARBAMOL (ROBAXIN) 750 MG TABLET    Take 1-2 tablets (750-1,500 mg) by mouth 3 times daily as needed for muscle spasms     Scribe Disclosure:  ALESSANDRA Andriy Colón, am serving as a scribe at 8:01 PM on 8/27/2022 to document services personally performed by Hilton Arvizu MD based on my observations and the provider's statements to me.     Hilton Arvizu MD  08/27/22 1193

## 2022-08-28 NOTE — DISCHARGE INSTRUCTIONS
-Take acetaminophen 500 to 1000 mg by mouth every 4 to 6 hours as needed for pain or fever.  Do not take more than 4000 mg in 24 hours.  Do not take within 6 hours of another acetaminophen containing medication such as norco (vicodin) or percocet.  - Take ibuprofen 600 to 800 mg by mouth every 6 to 8 hours as needed for pain or fever      Discharge Instructions  Back Pain  You were seen today for back pain. Back pain can have many causes, but most will get better without surgery or other specific treatment. Sometimes there is a herniated ( slipped ) disc. We do not usually do MRI scans to look for these right away, since most herniated discs will get better on their own with time.  Today, we did not find any evidence that your back pain was caused by a serious condition. However, sometimes symptoms develop over time and cannot be found during an emergency visit, so it is very important that you follow up with your primary provider.  Generally, every Emergency Department visit should have a follow-up clinic visit with either a primary or a specialty clinic/provider. Please follow-up as instructed by your emergency provider today.    Return to the Emergency Department if:  You develop a fever with your back pain.   You have weakness or change in sensation in one or both legs.  You lose control of your bowels or bladder, or cannot empty your bladder (cannot pee).  Your pain gets much worse.     Follow-up with your provider:  Unless your pain has completely gone away, please make an appointment with your provider within one week. Most of the routine care for back pain is available in a clinic and not the Emergency Department. You may need further management of your back pain, such as more pain medication, imaging such as an X-ray or MRI, or physical therapy.    What can I do to help myself?  Remain Active -- People are often afraid that they will hurt their back further or delay recovery by remaining active, but this  is one of the best things you can do for your back. In fact, staying in bed for a long time to rest is not recommended. Studies have shown that people with low back pain recover faster when they remain active. Movement helps to bring blood flow to the muscles and relieve muscle spasms as well as preventing loss of muscle strength.  Heat -- Using a heating pad can help with low back pain during the first few weeks. Do not sleep with a heating pad, as you can be burned.   Pain medications - You may take a pain medication such as Tylenol  (acetaminophen), Advil , Motrin  (ibuprofen) or Aleve  (naproxen).  If you were given a prescription for medicine here today, be sure to read all of the information (including the package insert) that comes with your prescription.  This will include important information about the medicine, its side effects, and any warnings that you need to know about.  The pharmacist who fills the prescription can provide more information and answer questions you may have about the medicine.  If you have questions or concerns that the pharmacist cannot address, please call or return to the Emergency Department.   Remember that you can always come back to the Emergency Department if you are not able to see your regular provider in the amount of time listed above, if you get any new symptoms, or if there is anything that worries you.

## 2022-08-28 NOTE — ED TRIAGE NOTES
"Pt arrives from the [facility] where she has been working 14 hour days on her feet. Reports yesterday her back was \"spasming\" but today the pain is constant. Reports mid back pain, reports previous dx of bulging disc. Pt reports taking Flexeril and extra strength Tylenol at 0800 and at 1330 w/ no improvement in sx. No loss of control of bladder or bowel. Reports no tingling in her extremities. Pt is A&O x 4.       "

## 2023-02-09 ENCOUNTER — HOSPITAL ENCOUNTER (EMERGENCY)
Facility: CLINIC | Age: 63
Discharge: HOME | End: 2023-02-10
Attending: EMERGENCY MEDICINE | Admitting: EMERGENCY MEDICINE
Payer: COMMERCIAL

## 2023-02-09 DIAGNOSIS — M54.50 ACUTE MIDLINE LOW BACK PAIN WITHOUT SCIATICA: ICD-10-CM

## 2023-02-09 DIAGNOSIS — R52 BURNING PAIN: ICD-10-CM

## 2023-02-09 PROCEDURE — 99284 EMERGENCY DEPT VISIT MOD MDM: CPT | Mod: 25

## 2023-02-10 VITALS
DIASTOLIC BLOOD PRESSURE: 78 MMHG | SYSTOLIC BLOOD PRESSURE: 158 MMHG | OXYGEN SATURATION: 100 % | RESPIRATION RATE: 18 BRPM | TEMPERATURE: 98.1 F | HEART RATE: 64 BPM

## 2023-02-10 PROCEDURE — 250N000013 HC RX MED GY IP 250 OP 250 PS 637: Performed by: EMERGENCY MEDICINE

## 2023-02-10 PROCEDURE — 250N000012 HC RX MED GY IP 250 OP 636 PS 637: Performed by: EMERGENCY MEDICINE

## 2023-02-10 PROCEDURE — 250N000011 HC RX IP 250 OP 636: Performed by: EMERGENCY MEDICINE

## 2023-02-10 PROCEDURE — 96372 THER/PROPH/DIAG INJ SC/IM: CPT | Performed by: EMERGENCY MEDICINE

## 2023-02-10 RX ORDER — PREDNISONE 20 MG/1
TABLET ORAL
Qty: 11 TABLET | Refills: 0 | Status: SHIPPED | OUTPATIENT
Start: 2023-02-10 | End: 2023-02-20

## 2023-02-10 RX ORDER — GABAPENTIN 100 MG/1
100 CAPSULE ORAL 3 TIMES DAILY PRN
Qty: 30 CAPSULE | Refills: 0 | Status: SHIPPED | OUTPATIENT
Start: 2023-02-10

## 2023-02-10 RX ORDER — HYDROMORPHONE HYDROCHLORIDE 1 MG/ML
0.5 INJECTION, SOLUTION INTRAMUSCULAR; INTRAVENOUS; SUBCUTANEOUS ONCE
Status: COMPLETED | OUTPATIENT
Start: 2023-02-10 | End: 2023-02-10

## 2023-02-10 RX ORDER — PREDNISONE 20 MG/1
40 TABLET ORAL ONCE
Status: COMPLETED | OUTPATIENT
Start: 2023-02-10 | End: 2023-02-10

## 2023-02-10 RX ORDER — CYCLOBENZAPRINE HCL 10 MG
10 TABLET ORAL ONCE
Status: COMPLETED | OUTPATIENT
Start: 2023-02-10 | End: 2023-02-10

## 2023-02-10 RX ORDER — CYCLOBENZAPRINE HCL 10 MG
5-10 TABLET ORAL 3 TIMES DAILY PRN
Qty: 30 TABLET | Refills: 0 | Status: SHIPPED | OUTPATIENT
Start: 2023-02-10

## 2023-02-10 RX ADMIN — CYCLOBENZAPRINE 10 MG: 10 TABLET, FILM COATED ORAL at 00:19

## 2023-02-10 RX ADMIN — HYDROMORPHONE HYDROCHLORIDE 0.5 MG: 1 INJECTION, SOLUTION INTRAMUSCULAR; INTRAVENOUS; SUBCUTANEOUS at 00:19

## 2023-02-10 RX ADMIN — PREDNISONE 40 MG: 20 TABLET ORAL at 00:19

## 2023-02-10 ASSESSMENT — ENCOUNTER SYMPTOMS
NUMBNESS: 1
BACK PAIN: 1
DIFFICULTY URINATING: 0
CONSTIPATION: 0
DIARRHEA: 0

## 2023-02-10 NOTE — ED PROVIDER NOTES
History     Chief Complaint:  Back Pain       HPI   Ramila Dietrich is a 62 year old female with a history of CAD who presents with worsening back pain. The patient also reports a burning sensation in her legs bilaterally. She says that this burning is in the front on the legs. She denies weakness. She says that she has been using a heating pad for around 3 weeks, however this is not helping. She states that she has a known disc for the past couple of months, however she says that she presented to the ED because the burning pain was new. She denies saddle anesthesia, difficulty urinating or making a bowel. She denies pain in her feet. She denies falls or injuries. The patient reports that she has been putting off her own treatment because she has a daughter going through cancer treatments.     Independent Historian:   Spouse/partner present in room, provides no pertinent history.     Review of External Notes: Previous ED visits for back pain noted, most recently last August,  without narcotic scripts    ROS:  Review of Systems   Gastrointestinal: Negative for constipation and diarrhea.   Genitourinary: Negative for difficulty urinating.   Musculoskeletal: Positive for back pain.   Neurological: Positive for numbness.   All other systems reviewed and are negative.    Allergies:  Erythromycin  Aminophylline     Medications:    Albuterol  Aspirin  Cyclobenzaprine  Ezetimibe  Famotidine  Losartan  Metoprolol tartrate  Nitroglycerin  Rosuvastatin     Past Medical History:    Acid reflux  Asthma   Hiatal hernia  CAD  Gastroenteritis  Hemorrhagic colitis   Tobacco abuse     Past Surgical History:    Appendectomy    x 5  Coronary angiogram  Heart catheterization   PCI stent drug eluting  Ortho surgery  Tonsillectomy      Family History:    Cancer  Heart disease  Neurologic disorder    Social History:  Reports that she has been smoking. She has a 20.00 pack-year smoking history. She has never used smokeless  tobacco. She reports that she does not drink alcohol and does not use drugs.  PCP: [facility]     Physical Exam     Patient Vitals for the past 24 hrs:   BP Temp Pulse Resp SpO2   02/09/23 2259 (!) 174/87 98.1  F (36.7  C) 67 18 100 %      Physical Exam    Head:               The scalp, face, and head appear normal and symmetric  Eyes:               Sclera white; pupils equal  ENT:                External ears and nares normal  Resp:               Non-labored, normal phonation  GI:                   Abdomen is soft, non-tender, non-distended  MS:                  Moves all extremities                          States pain is midline lumbar, no step off or skin changes  Neuro:             Speech is normal and fluent.                           Strength 5/5 in BLE including hip flex/add/abd, knee flex/ext, DF, PF, EHL.                          Sensation symmetric in BLE.    Emergency Department Course     Emergency Department Course & Assessments:    Interventions:  Medications   HYDROmorphone (PF) (DILAUDID) injection 0.5 mg (0.5 mg Intramuscular Given 2/10/23 0019)   predniSONE (DELTASONE) tablet 40 mg (40 mg Oral Given 2/10/23 0019)   cyclobenzaprine (FLEXERIL) tablet 10 mg (10 mg Oral Given 2/10/23 0019)        Independent Interpretation (X-rays, CTs, rhythm strip):  NA    Social Determinants of Health affecting care:   None    Assessments:  0009 I examined the patient and obtained history as noted above.     Disposition:  The patient was discharged to home.     Impression & Plan      Medical Decision Making:  Ramila Dietrich is a 62 year old female who presents for evaluation of back pain and radicular symptoms. She did not sustain any trauma and has no history of cancer, therefore x-rays are not necessary due to the low likelihood of fracture or subluxation. There is no clinical evidence of cauda equina syndrome, discitis, spinal/epidural space hematoma or epidural abscess or other emergently  worrisome etiology.  Advanced imaging with MRI is not indicated at this time.  Nor is there any indication for consultation with neurosurgery or orthopedic spinal surgeon.      The patient was advised that radiculopathy often takes significant time to resolve, and that follow up with primary care, physical therapy, and/or specialist will be indicated if symptoms do not improve. she will be discharged with pain medications to use as directed.  No heavy lifting, bending or twisting. Return if increasing pain, muscular weakness, or bowel or bladder dysfunction.  Referral placed for PT and recommended to follow up outpatient with her providers.    Diagnosis:    ICD-10-CM    1. Acute midline low back pain without sciatica  M54.50       2. Burning pain in legs  R52            Discharge Medications:  Discharge Medication List as of 2/10/2023 12:47 AM      START taking these medications    Details   gabapentin (NEURONTIN) 100 MG capsule Take 1 capsule (100 mg) by mouth 3 times daily as needed (nerve pain.  Can increase as tolerated to 300mg three times a day.), Disp-30 capsule, R-0, E-Prescribe      predniSONE (DELTASONE) 20 MG tablet 2 tabs day 1-3, then 1 tab days 4-6, then 1/2 tab daily for 4 days, Disp-11 tablet, R-0, E-Prescribe            Scribe Disclosure:  I, Allen Barr, am serving as a scribe at 11:57 PM on 2/9/2023 to document services personally performed by Ngoc Ward MD based on my observations and the provider's statements to me.     2/9/2023   Ngoc Ward MD Gosen, Christine Leigh, MD  02/12/23 1523

## 2023-02-10 NOTE — ED TRIAGE NOTES
Patient states she has a known herniated disc in her mid back, patient states that about 3 weeks ago her upper back began bothering her and she could manage with tylenol and a heating pad, today patient began having a burning sensation in her back and tingling in her legs.      Triage Assessment     Row Name 02/09/23 6066       Triage Assessment (Adult)    Airway WDL WDL       Respiratory WDL    Respiratory WDL WDL       Skin Circulation/Temperature WDL    Skin Circulation/Temperature WDL WDL       Cardiac WDL    Cardiac WDL WDL       Peripheral/Neurovascular WDL    Peripheral Neurovascular WDL WDL       Cognitive/Neuro/Behavioral WDL    Cognitive/Neuro/Behavioral WDL WDL

## 2023-03-28 DIAGNOSIS — I25.10 CORONARY ARTERY DISEASE INVOLVING NATIVE CORONARY ARTERY OF NATIVE HEART WITHOUT ANGINA PECTORIS: ICD-10-CM

## 2023-03-28 RX ORDER — EZETIMIBE 10 MG/1
10 TABLET ORAL DAILY
Qty: 90 TABLET | Refills: 0 | Status: SHIPPED | OUTPATIENT
Start: 2023-03-28 | End: 2023-08-21

## 2023-07-24 DIAGNOSIS — I25.10 CORONARY ARTERY DISEASE INVOLVING NATIVE CORONARY ARTERY OF NATIVE HEART WITHOUT ANGINA PECTORIS: ICD-10-CM

## 2023-07-24 RX ORDER — ROSUVASTATIN CALCIUM 40 MG/1
40 TABLET, COATED ORAL DAILY
Qty: 90 TABLET | Refills: 0 | Status: SHIPPED | OUTPATIENT
Start: 2023-07-24 | End: 2023-11-07

## 2023-07-24 NOTE — LETTER
July 24, 2023       TO: Ramila Dietrich  [address]  [place] [facility] [address]       Dear Ramila Dietrich,    We recently received a fax from your pharmacy requesting a refill of your medication(s).    Our records indicate that you are due for follow-up with your Heart Care Provider. We will refill your medications for 3 months which will allow you enough time to be seen.    Please call [phone] to schedule your appointment.    Thank you for allowing [facility] to be a part of your health care team and we look forward to seeing you soon.    Thank you,    [facility]

## 2023-08-18 ENCOUNTER — TELEPHONE (OUTPATIENT)
Dept: CARDIOLOGY | Facility: CLINIC | Age: 63
End: 2023-08-18

## 2023-08-18 NOTE — TELEPHONE ENCOUNTER
Health Call Center    Phone Message    May a detailed message be left on voicemail: yes     Reason for Call: Order(s): Other:   Reason for requested: Lab Orders   Date needed: appt is scheduled for Nov  Provider name: Candelaria Garza    NOTE:  pt states she always has labs prior to seeing Candelaria Garza.  If labs are required please enter orders for them    Action Taken: Message routed to:  [facility] cardio    Travel Screening: Not Applicable    Thank you!  [facility]

## 2023-08-18 NOTE — TELEPHONE ENCOUNTER
Routing to cardiology scheduling,     Please call patient to scheduling fasting labs (lipid, ALT, BMP)    Thank you!    Elysia Gordon RN

## 2023-08-21 DIAGNOSIS — I20.0 UNSTABLE ANGINA (H): ICD-10-CM

## 2023-08-21 DIAGNOSIS — I25.10 CORONARY ARTERY DISEASE INVOLVING NATIVE CORONARY ARTERY OF NATIVE HEART WITHOUT ANGINA PECTORIS: ICD-10-CM

## 2023-08-21 RX ORDER — EZETIMIBE 10 MG/1
10 TABLET ORAL DAILY
Qty: 90 TABLET | Refills: 0 | Status: SHIPPED | OUTPATIENT
Start: 2023-08-21 | End: 2023-11-07

## 2023-08-21 RX ORDER — METOPROLOL TARTRATE 25 MG/1
25 TABLET, FILM COATED ORAL 2 TIMES DAILY
Qty: 180 TABLET | Refills: 0 | Status: SHIPPED | OUTPATIENT
Start: 2023-08-21 | End: 2023-11-07

## 2023-08-21 RX ORDER — ASPIRIN 81 MG/1
81 TABLET ORAL DAILY
Qty: 90 TABLET | Refills: 0 | Status: SHIPPED | OUTPATIENT
Start: 2023-08-21 | End: 2023-11-07

## 2023-09-14 NOTE — PROGRESS NOTES
10/10 CXL HISTORY OF PRESENT ILLNESS:    This is a 61 year old female who follows with Dr. Thurston at [facility] [place].  Her past medical history includes: hypertension, coronary artery disease, tobacco dependence    Ms Dietrich was admitted for chest pain and concern for unstable angina (3/24/21)  EKG showed small inferior Q-waves with some inferolateral ST changes. She underwent stenting to both her proximal and distal RCA. A chest CT showed moderate emphysema  Her ECHO showed LVEF 55-60%, normal RV function, and no significant valvular pathology. She was placed on Brilinta, Atorvastatin, and Metoprolol  Her home Amlodipine was discontinued.    She was readmitted (4/6/21) with resting chest pain and ruled out for a MI. Coronary angiography was performed (4/6/21) which showed widely patent RCA stents and no significant obstructive CAD elsewhere.  Her chest pain was felt to likely be related to GERD and she was sent home on Pepcid.  Her Brilinta was changed to Plavix.    Our visit today is for further review.    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  Since her last coronary angiography, she has not had any further chest pain or shortness of breath.  She is sleeping well. She has cut back on smoking and now only smoking 1/2 ppd.  Her energy is good.  She denies palpitations, orthopnea, or peripheral edema.    VITAL SIGNS:  BP: 158/85   150/80  Pulse: 68  Weight: 100 lbs (stable)  BMI: 18  Right radial puncture site without bleeding, hematoma, or bruit    IMPRESSION AND PLAN:    Coronary Artery Disease:  -s/p AKASH to distal RCA and AKASH to proximal RCA (3/24/21)  -repeat cath (4/6/21) done for CP showed patent stents and no other obstructive CAD  -CP likely related to GERD  -denies further CP  -LVEF 55%  -Plavix for at least 1 yr, ASA 81 mg indefinitely  -considering cardiac rehab    Hypertension:  -on Metoprolol 25 mg BID  -BP elevated  -start Lisinopril 5 mg  -return in 1 month with  labs    Hyperlipidemia:  -Atorvastatin 40 mg started last month due to   -repeat lipid panel next month    Tobacco dependence  -has decreased her smoking to 1/2 ppd   -motivated to eventually quit  I encouraged cessation    The total time for the visit today was 30 minutes which includes patient visit, reviewing of records, discussion, and placing of orders of the outpatient coordination of cardiovascular care as described.  The level of medical decision making during this visit was of moderate complexity.  Thank you for allowing me to participate in their care.    Orders Placed This Encounter   Procedures     Basic metabolic panel     Lipid Profile     ALT     Follow-Up with Cardiac Advanced Practice Provider       Orders Placed This Encounter   Medications     lisinopril (ZESTRIL) 5 MG tablet     Sig: Take 1 tablet (5 mg) by mouth daily     Dispense:  90 tablet     Refill:  3       There are no discontinued medications.      Encounter Diagnoses   Name Primary?     Coronary artery disease involving native coronary artery of native heart without angina pectoris      Benign essential hypertension Yes       CURRENT MEDICATIONS:  Current Outpatient Medications   Medication Sig Dispense Refill     albuterol (PROAIR HFA/PROVENTIL HFA/VENTOLIN HFA) 108 (90 Base) MCG/ACT inhaler Inhale 2 puffs into the lungs every 6 hours as needed for shortness of breath / dyspnea or wheezing       aspirin (ASA) 81 MG EC tablet Take 1 tablet (81 mg) by mouth daily 30 tablet 0     atorvastatin (LIPITOR) 40 MG tablet Take 1 tablet (40 mg) by mouth every evening 30 tablet 0     clopidogrel (PLAVIX) 75 MG tablet Take 1 tablet (75 mg) by mouth daily (Take in place of Brilinta) 90 tablet 1     famotidine (PEPCID) 20 MG tablet Take 1 tablet (20 mg) by mouth 2 times daily as needed (heartburn, chest discomfort) 60 tablet 1     lisinopril (ZESTRIL) 5 MG tablet Take 1 tablet (5 mg) by mouth daily 90 tablet 3     metoprolol tartrate  "(LOPRESSOR) 25 MG tablet Take 1 tablet (25 mg) by mouth 2 times daily 60 tablet 0     nitroGLYcerin (NITROSTAT) 0.4 MG sublingual tablet For chest pain place 1 tablet under the tongue every 5 minutes for 3 doses. If symptoms persist 5 minutes after 2nd dose call 911. 30 tablet 0     fluticasone (FLONASE) 50 MCG/ACT nasal spray Spray 1 spray into both nostrils daily as needed for rhinitis or allergies         ALLERGIES     Allergies   Allergen Reactions     Erythromycin      Aminophylline      \"got jittery\"       PAST MEDICAL HISTORY:  Past Medical History:   Diagnosis Date     Acid reflux      Asthma      Hiatal hernia        PAST SURGICAL HISTORY:  Past Surgical History:   Procedure Laterality Date     APPENDECTOMY        SECTION      x5     CV CORONARY ANGIOGRAM N/A 3/26/2021    Procedure: Coronary Angiogram;  Surgeon: Madi Dye MD;  Location:  HEART CARDIAC CATH LAB     CV HEART CATHETERIZATION WITH POSSIBLE INTERVENTION N/A 2021    Procedure: coronary angiogram;  Surgeon: Madi Dye MD;  Location:  HEART CARDIAC CATH LAB     CV INSTANTANEOUS WAVE-FREE RATIO N/A 3/26/2021    Procedure: Instantaneous Wave-Free Ratio;  Surgeon: Madi Dye MD;  Location:  HEART CARDIAC CATH LAB     CV PCI STENT DRUG ELUTING N/A 3/26/2021    Procedure: Percutaneous Coronary Intervention Stent Drug Eluting;  Surgeon: Madi Dye MD;  Location:  HEART CARDIAC CATH LAB     ORTHOPEDIC SURGERY       TONSILLECTOMY         FAMILY HISTORY:  Family History   Problem Relation Age of Onset     Heart Disease Mother          75yo [place] emphysema smoker     Cancer Father          87yo lung smoker     Family History Negative Sister 32        lung issue     Family History Negative Son      Family History Negative Son      Family History Negative Son      Family History Negative Daughter      Neurologic Disorder Daughter 23        MS     Cancer Maternal Grandfather          age? " "throat smoker       SOCIAL HISTORY:  Social History     Socioeconomic History     Marital status: Single     Spouse name: None     Number of children: 5     Years of education: None     Highest education level: None   Occupational History     Employer: [employer]   Social Needs     Financial resource strain: None     Food insecurity     Worry: None     Inability: None     Transportation needs     Medical: None     Non-medical: None   Tobacco Use     Smoking status: Current Every Day Smoker     Packs/day: 1.00     Years: 40.00     Pack years: 40.00     Smokeless tobacco: Never Used     Tobacco comment: Smoker since age 13. 1/2 ppd.   Substance and Sexual Activity     Alcohol use: No     Drug use: No     Sexual activity: Not Currently   Lifestyle     Physical activity     Days per week: None     Minutes per session: None     Stress: None   Relationships     Social connections     Talks on phone: None     Gets together: None     Attends Scientologist service: None     Active member of club or organization: None     Attends meetings of clubs or organizations: None     Relationship status: None     Intimate partner violence     Fear of current or ex partner: None     Emotionally abused: None     Physically abused: None     Forced sexual activity: None   Other Topics Concern     None   Social History Narrative     None       Review of Systems:  Skin:  Negative       Eyes:  Negative      ENT:  Negative      Respiratory:  Negative       Cardiovascular:  Negative      Gastroenterology: Negative      Genitourinary:  Negative      Musculoskeletal:  Negative      Neurologic:  Negative      Psychiatric:  Negative      Heme/Lymph/Imm:  Negative      Endocrine:  Negative        Physical Exam:  Vitals: BP (!) 150/80   Pulse 68   Ht 1.575 m (5' 2\")   Wt 45.7 kg (100 lb 11.2 oz)   LMP  (LMP Unknown)   Breastfeeding No   BMI 18.42 kg/m      Constitutional:  cooperative thin      Skin:  warm and dry to the touch          Head:  " normocephalic        Eyes:           Lymph:      ENT:           Neck:  JVP normal;no carotid bruit        Respiratory:  clear to auscultation;normal respiratory excursion         Cardiac: regular rhythm;normal S1 and S2     no presence of murmur          pulses full and equal                                        GI:           Extremities and Muscular Skeletal:  no edema              Neurological:  affect appropriate        Psych:             CC  Cameron Thurston MD  [id number]  [place] [address]

## 2023-10-09 DIAGNOSIS — I10 BENIGN ESSENTIAL HYPERTENSION: ICD-10-CM

## 2023-10-09 RX ORDER — LOSARTAN POTASSIUM 50 MG/1
50 TABLET ORAL 2 TIMES DAILY
Qty: 180 TABLET | Refills: 0 | Status: SHIPPED | OUTPATIENT
Start: 2023-10-09 | End: 2023-11-07

## 2023-11-03 ENCOUNTER — LAB (OUTPATIENT)
Dept: LAB | Facility: CLINIC | Age: 63
End: 2023-11-03
Payer: COMMERCIAL

## 2023-11-03 DIAGNOSIS — I25.10 CORONARY ARTERY DISEASE INVOLVING NATIVE CORONARY ARTERY OF NATIVE HEART WITHOUT ANGINA PECTORIS: ICD-10-CM

## 2023-11-03 LAB
ALT SERPL W P-5'-P-CCNC: 13 U/L (ref 0–50)
ANION GAP SERPL CALCULATED.3IONS-SCNC: 10 MMOL/L (ref 7–15)
BUN SERPL-MCNC: 10.9 MG/DL (ref 8–23)
CALCIUM SERPL-MCNC: 9.3 MG/DL (ref 8.8–10.2)
CHLORIDE SERPL-SCNC: 101 MMOL/L (ref 98–107)
CHOLEST SERPL-MCNC: 150 MG/DL
CREAT SERPL-MCNC: 0.89 MG/DL (ref 0.51–0.95)
DEPRECATED HCO3 PLAS-SCNC: 23 MMOL/L (ref 22–29)
EGFRCR SERPLBLD CKD-EPI 2021: 72 ML/MIN/1.73M2
GLUCOSE SERPL-MCNC: 92 MG/DL (ref 70–99)
HDLC SERPL-MCNC: 58 MG/DL
LDLC SERPL CALC-MCNC: 65 MG/DL
NONHDLC SERPL-MCNC: 92 MG/DL
POTASSIUM SERPL-SCNC: 4.4 MMOL/L (ref 3.4–5.3)
SODIUM SERPL-SCNC: 134 MMOL/L (ref 135–145)
TRIGL SERPL-MCNC: 134 MG/DL

## 2023-11-03 PROCEDURE — 36415 COLL VENOUS BLD VENIPUNCTURE: CPT | Performed by: NURSE PRACTITIONER

## 2023-11-03 PROCEDURE — 84460 ALANINE AMINO (ALT) (SGPT): CPT | Performed by: NURSE PRACTITIONER

## 2023-11-03 PROCEDURE — 80061 LIPID PANEL: CPT | Performed by: NURSE PRACTITIONER

## 2023-11-03 PROCEDURE — 80048 BASIC METABOLIC PNL TOTAL CA: CPT | Performed by: NURSE PRACTITIONER

## 2023-11-06 NOTE — PROGRESS NOTES
HISTORY OF PRESENT ILLNESS:     This is a 63 year old female who follows with Dr Thurston at [facility].   Her past medical history includes: Hypertension, coronary artery disease, COPD, degenerative disc disease, and   tobacco dependence     Ms Dietrich suffered acute coronary syndrome and underwent stenting to her proximal and distal RCA (3/2021)  A chest CT showed moderate emphysema  Her ECHO showed LVEF 55-60%, normal RV function, and no significant valvular pathology.      She was readmitted (4/2021) with resting chest pain and ruled out for a MI. Repeat coronary angiography showed widely patent RCA stents and no significant obstructive CAD elsewhere.  Her chest pain was treated with Pepcid.      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  She checks her blood pressure at home and states that her BP there is 130/80  She thinks she has an element of white coat hypertension.          VITAL SIGNS:  BP: 166/84, 130/80 (at home)  Pulse:  68  Weight:  107 lbs  (BMI: 19)    Labs (11/3/23)  Sodium: 134  Potassium: 4.4  BUN: 10  Creatinine: 0.89  Total Cholesterol: 150  Triglycerides: 134  HDL: 58  LDL: 65  ALT: 13        IMPRESSION AND PLAN:     Coronary Artery Disease:  -s/p stenting x2 to her proximal and distal RCA (3/2021)  -cath (4/2021) showed widely patent stents and no other obstructive CAD  -denies angina       Hypertension:  -on Losartan 50 BID, Metoprolol 25 mg BID  -BP elevated  -will change Losartan to Olmesartan  -intolerance to hydrochlorothiazide (hypotension)  -return in 3 months with RN BP check and labs     Hyperlipidemia:  -on Crestor 40 mg, Zetia  -LDL  65     Tobacco Dependence  -encouraged complete cessation    The total time for the visit today was 30 minutes which  includes patient visit, reviewing of records, discussion, and placing of orders of the outpatient coordination of cardiovascular care as described.  The level of medical decision making during this visit was of moderate complexity.  Thank you for allowing me to participate in their care.     Orders Placed This Encounter   Procedures    Basic metabolic panel    Follow-Up with Cardiology Nurse    Follow-Up with Cardiology       Orders Placed This Encounter   Medications    omeprazole (PRILOSEC OTC) 20 MG EC tablet     Sig: Take 20 mg by mouth daily Dosage is unknown    acetaminophen (TYLENOL) 500 MG tablet     Sig: Take 500-1,000 mg by mouth every 6 hours as needed for mild pain    rosuvastatin (CRESTOR) 40 MG tablet     Sig: Take 1 tablet (40 mg) by mouth daily Appointment needed for further refills     Dispense:  90 tablet     Refill:  3    metoprolol tartrate (LOPRESSOR) 25 MG tablet     Sig: Take 1 tablet (25 mg) by mouth 2 times daily     Dispense:  180 tablet     Refill:  3    ezetimibe (ZETIA) 10 MG tablet     Sig: Take 1 tablet (10 mg) by mouth daily     Dispense:  90 tablet     Refill:  3    aspirin 81 MG EC tablet     Sig: Take 1 tablet (81 mg) by mouth daily     Dispense:  90 tablet     Refill:  3    olmesartan (BENICAR) 40 MG tablet     Sig: Take 1 tablet (40 mg) by mouth daily     Dispense:  90 tablet     Refill:  3       Medications Discontinued During This Encounter   Medication Reason    losartan (COZAAR) 50 MG tablet     rosuvastatin (CRESTOR) 40 MG tablet Reorder (No AVS)    ezetimibe (ZETIA) 10 MG tablet Reorder (No AVS)    aspirin 81 MG EC tablet Reorder (No AVS)    metoprolol tartrate (LOPRESSOR) 25 MG tablet Reorder (No AVS)         Encounter Diagnoses   Name Primary?    Coronary artery disease involving native coronary artery of native heart without angina pectoris     Unstable angina (H)     Benign essential hypertension        CURRENT MEDICATIONS:  Current Outpatient Medications   Medication  "Sig Dispense Refill    acetaminophen (TYLENOL) 500 MG tablet Take 500-1,000 mg by mouth every 6 hours as needed for mild pain      aspirin 81 MG EC tablet Take 1 tablet (81 mg) by mouth daily 90 tablet 3    cyclobenzaprine (FLEXERIL) 10 MG tablet Take 0.5-1 tablets (5-10 mg) by mouth 3 times daily as needed (muscle relaxant) 30 tablet 0    ezetimibe (ZETIA) 10 MG tablet Take 1 tablet (10 mg) by mouth daily 90 tablet 3    gabapentin (NEURONTIN) 100 MG capsule Take 1 capsule (100 mg) by mouth 3 times daily as needed (nerve pain.  Can increase as tolerated to 300mg three times a day.) 30 capsule 0    metoprolol tartrate (LOPRESSOR) 25 MG tablet Take 1 tablet (25 mg) by mouth 2 times daily 180 tablet 3    nitroGLYcerin (NITROSTAT) 0.4 MG sublingual tablet For chest pain place 1 tablet under the tongue every 5 minutes for 3 doses. If symptoms persist 5 minutes after 2nd dose call 911. 30 tablet 0    olmesartan (BENICAR) 40 MG tablet Take 1 tablet (40 mg) by mouth daily 90 tablet 3    omeprazole (PRILOSEC OTC) 20 MG EC tablet Take 20 mg by mouth daily Dosage is unknown      rosuvastatin (CRESTOR) 40 MG tablet Take 1 tablet (40 mg) by mouth daily Appointment needed for further refills 90 tablet 3    albuterol (PROAIR HFA/PROVENTIL HFA/VENTOLIN HFA) 108 (90 Base) MCG/ACT inhaler Inhale 2 puffs into the lungs every 6 hours as needed for shortness of breath / dyspnea or wheezing (Patient not taking: Reported on 11/7/2023)      famotidine (PEPCID) 20 MG tablet Take 1 tablet (20 mg) by mouth 2 times daily as needed (heartburn, chest discomfort) (Patient not taking: Reported on 11/7/2023) 60 tablet 1    ibuprofen (ADVIL/MOTRIN) 600 MG tablet Take 1 tablet (600 mg) by mouth every 6 hours as needed for moderate pain (Patient not taking: Reported on 11/7/2023) 30 tablet 0       ALLERGIES     Allergies   Allergen Reactions    Erythromycin     Aminophylline      \"got jittery\"       PAST MEDICAL HISTORY:  Past Medical History: "   Diagnosis Date    Acid reflux     Asthma     Hiatal hernia        PAST SURGICAL HISTORY:  Past Surgical History:   Procedure Laterality Date    APPENDECTOMY       SECTION      x5    CV CORONARY ANGIOGRAM N/A 3/26/2021    Procedure: Coronary Angiogram;  Surgeon: Madi Dye MD;  Location:  HEART CARDIAC CATH LAB    CV HEART CATHETERIZATION WITH POSSIBLE INTERVENTION N/A 2021    Procedure: coronary angiogram;  Surgeon: Madi Dye MD;  Location:  HEART CARDIAC CATH LAB    CV INSTANTANEOUS WAVE-FREE RATIO N/A 3/26/2021    Procedure: Instantaneous Wave-Free Ratio;  Surgeon: Madi Dye MD;  Location:  HEART CARDIAC CATH LAB    CV PCI STENT DRUG ELUTING N/A 3/26/2021    Procedure: Percutaneous Coronary Intervention Stent Drug Eluting;  Surgeon: Madi Dye MD;  Location:  HEART CARDIAC CATH LAB    ORTHOPEDIC SURGERY      TONSILLECTOMY         FAMILY HISTORY:  Family History   Problem Relation Age of Onset    Heart Disease Mother          75yo [place] emphysema smoker    Cancer Father          87yo lung smoker    Family History Negative Sister 32        lung issue    Family History Negative Son     Family History Negative Son     Family History Negative Son     Family History Negative Daughter     Neurologic Disorder Daughter 23        MS    Cancer Maternal Grandfather          age? throat smoker       SOCIAL HISTORY:  Social History     Socioeconomic History    Marital status: Single     Spouse name: None    Number of children: 5    Years of education: None    Highest education level: None   Occupational History     Employer: [employer]   Tobacco Use    Smoking status: Every Day     Packs/day: 0.50     Years: 40.00     Additional pack years: 0.00     Total pack years: 20.00     Types: Cigarettes    Smokeless tobacco: Never    Tobacco comments:     Smoker since age 13. 1/2-3/4 ppd.   Substance and Sexual Activity    Alcohol use: No    Drug use: No     "Sexual activity: Not Currently       Review of Systems:  Skin:  not assessed       Eyes:  not assessed      ENT:  not assessed      Respiratory:  Negative       Cardiovascular:    Positive for;lightheadedness lightheadedness is seldom when standing quickly  Gastroenterology: not assessed      Genitourinary:  not assessed      Musculoskeletal:  not assessed      Neurologic:  not assessed      Psychiatric:  not assessed      Heme/Lymph/Imm:  not assessed      Endocrine:  not assessed        Physical Exam:  Vitals: BP (!) 166/84   Pulse 68   Ht 1.575 m (5' 2\")   Wt 48.9 kg (107 lb 12.8 oz)   LMP  (LMP Unknown)   BMI 19.72 kg/m      Constitutional:  cooperative thin      Skin:  warm and dry to the touch          Head:  normocephalic        Eyes:  pupils equal and round        Lymph:      ENT:  no pallor or cyanosis        Neck:  JVP normal;no carotid bruit        Respiratory:  clear to auscultation;normal respiratory excursion         Cardiac: regular rhythm;normal S1 and S2     no presence of murmur            pulses below the femoral arteries are diminished                                      GI:  abdomen soft        Extremities and Muscular Skeletal:  no edema              Neurological:  affect appropriate        Psych:  Alert and Oriented x 3          CC  No referring provider defined for this encounter.                    "

## 2023-11-07 ENCOUNTER — OFFICE VISIT (OUTPATIENT)
Dept: CARDIOLOGY | Facility: CLINIC | Age: 63
End: 2023-11-07
Payer: COMMERCIAL

## 2023-11-07 VITALS
WEIGHT: 107.8 LBS | SYSTOLIC BLOOD PRESSURE: 166 MMHG | HEART RATE: 68 BPM | HEIGHT: 62 IN | DIASTOLIC BLOOD PRESSURE: 84 MMHG | BODY MASS INDEX: 19.84 KG/M2

## 2023-11-07 DIAGNOSIS — I20.0 UNSTABLE ANGINA (H): ICD-10-CM

## 2023-11-07 DIAGNOSIS — I10 BENIGN ESSENTIAL HYPERTENSION: ICD-10-CM

## 2023-11-07 DIAGNOSIS — I25.10 CORONARY ARTERY DISEASE INVOLVING NATIVE CORONARY ARTERY OF NATIVE HEART WITHOUT ANGINA PECTORIS: ICD-10-CM

## 2023-11-07 PROCEDURE — 99214 OFFICE O/P EST MOD 30 MIN: CPT | Performed by: NURSE PRACTITIONER

## 2023-11-07 RX ORDER — ACETAMINOPHEN 500 MG
500-1000 TABLET ORAL EVERY 6 HOURS PRN
COMMUNITY

## 2023-11-07 RX ORDER — ASPIRIN 81 MG/1
81 TABLET ORAL DAILY
Qty: 90 TABLET | Refills: 3 | Status: SHIPPED | OUTPATIENT
Start: 2023-11-07

## 2023-11-07 RX ORDER — EZETIMIBE 10 MG/1
10 TABLET ORAL DAILY
Qty: 90 TABLET | Refills: 3 | Status: SHIPPED | OUTPATIENT
Start: 2023-11-07

## 2023-11-07 RX ORDER — OMEPRAZOLE 20 MG/1
20 TABLET, DELAYED RELEASE ORAL DAILY
COMMUNITY

## 2023-11-07 RX ORDER — METOPROLOL TARTRATE 25 MG/1
25 TABLET, FILM COATED ORAL 2 TIMES DAILY
Qty: 180 TABLET | Refills: 3 | Status: SHIPPED | OUTPATIENT
Start: 2023-11-07

## 2023-11-07 RX ORDER — ROSUVASTATIN CALCIUM 40 MG/1
40 TABLET, COATED ORAL DAILY
Qty: 90 TABLET | Refills: 3 | Status: SHIPPED | OUTPATIENT
Start: 2023-11-07

## 2023-11-07 RX ORDER — LOSARTAN POTASSIUM 50 MG/1
50 TABLET ORAL 2 TIMES DAILY
Qty: 180 TABLET | Refills: 3 | Status: CANCELLED | OUTPATIENT
Start: 2023-11-07

## 2023-11-07 RX ORDER — OLMESARTAN MEDOXOMIL 40 MG/1
40 TABLET ORAL DAILY
Qty: 90 TABLET | Refills: 3 | Status: SHIPPED | OUTPATIENT
Start: 2023-11-07

## 2023-11-07 NOTE — PATIENT INSTRUCTIONS
Stop Losartan  Start Olmesartan  Return in 3 months with labs    It was a pleasure seeing you today     Please do not hesitate to call my nurse team with any questions or concerns:  [phone]    Scheduling number:  [phone]    Candelaria ANGÉLICA Garza, CNP

## 2023-11-07 NOTE — LETTER
11/7/2023    [facility] [address] [place] [address]    RE: Ramila Gerardo       Dear Colleague,     I had the pleasure of seeing Ramila Dietrich in the [facility].  HISTORY OF PRESENT ILLNESS:     This is a 63 year old female who follows with Dr Thurston at [facility].   Her past medical history includes: Hypertension, coronary artery disease, COPD, degenerative disc disease, and   tobacco dependence     Ms Dietrich suffered acute coronary syndrome and underwent stenting to her proximal and distal RCA (3/2021)  A chest CT showed moderate emphysema  Her ECHO showed LVEF 55-60%, normal RV function, and no significant valvular pathology.      She was readmitted (4/2021) with resting chest pain and ruled out for a MI. Repeat coronary angiography showed widely patent RCA stents and no significant obstructive CAD elsewhere.  Her chest pain was treated with Pepcid.      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  She checks her blood pressure at home and states that her BP there is 130/80  She thinks she has an element of white coat hypertension.          VITAL SIGNS:  BP: 166/84, 130/80 (at home)  Pulse:  68  Weight:  107 lbs  (BMI: 19)    Labs (11/3/23)  Sodium: 134  Potassium: 4.4  BUN: 10  Creatinine: 0.89  Total Cholesterol: 150  Triglycerides: 134  HDL: 58  LDL: 65  ALT: 13        IMPRESSION AND PLAN:     Coronary Artery Disease:  -s/p stenting x2 to her proximal and distal RCA (3/2021)  -cath (4/2021) showed widely patent stents and no other obstructive CAD  -denies angina       Hypertension:  -on Losartan 50 BID, Metoprolol 25 mg BID  -BP elevated  -will change Losartan to Olmesartan  -intolerance to hydrochlorothiazide  (hypotension)  -return in 3 months with RN BP check and labs     Hyperlipidemia:  -on Crestor 40 mg, Zetia  -LDL  65     Tobacco Dependence  -encouraged complete cessation    The total time for the visit today was 30 minutes which includes patient visit, reviewing of records, discussion, and placing of orders of the outpatient coordination of cardiovascular care as described.  The level of medical decision making during this visit was of moderate complexity.  Thank you for allowing me to participate in their care.     Orders Placed This Encounter   Procedures    Basic metabolic panel    Follow-Up with Cardiology Nurse    Follow-Up with Cardiology       Orders Placed This Encounter   Medications    omeprazole (PRILOSEC OTC) 20 MG EC tablet     Sig: Take 20 mg by mouth daily Dosage is unknown    acetaminophen (TYLENOL) 500 MG tablet     Sig: Take 500-1,000 mg by mouth every 6 hours as needed for mild pain    rosuvastatin (CRESTOR) 40 MG tablet     Sig: Take 1 tablet (40 mg) by mouth daily Appointment needed for further refills     Dispense:  90 tablet     Refill:  3    metoprolol tartrate (LOPRESSOR) 25 MG tablet     Sig: Take 1 tablet (25 mg) by mouth 2 times daily     Dispense:  180 tablet     Refill:  3    ezetimibe (ZETIA) 10 MG tablet     Sig: Take 1 tablet (10 mg) by mouth daily     Dispense:  90 tablet     Refill:  3    aspirin 81 MG EC tablet     Sig: Take 1 tablet (81 mg) by mouth daily     Dispense:  90 tablet     Refill:  3    olmesartan (BENICAR) 40 MG tablet     Sig: Take 1 tablet (40 mg) by mouth daily     Dispense:  90 tablet     Refill:  3       Medications Discontinued During This Encounter   Medication Reason    losartan (COZAAR) 50 MG tablet     rosuvastatin (CRESTOR) 40 MG tablet Reorder (No AVS)    ezetimibe (ZETIA) 10 MG tablet Reorder (No AVS)    aspirin 81 MG EC tablet Reorder (No AVS)    metoprolol tartrate (LOPRESSOR) 25 MG tablet Reorder (No AVS)         Encounter Diagnoses   Name Primary?     Coronary artery disease involving native coronary artery of native heart without angina pectoris     Unstable angina (H)     Benign essential hypertension        CURRENT MEDICATIONS:  Current Outpatient Medications   Medication Sig Dispense Refill    acetaminophen (TYLENOL) 500 MG tablet Take 500-1,000 mg by mouth every 6 hours as needed for mild pain      aspirin 81 MG EC tablet Take 1 tablet (81 mg) by mouth daily 90 tablet 3    cyclobenzaprine (FLEXERIL) 10 MG tablet Take 0.5-1 tablets (5-10 mg) by mouth 3 times daily as needed (muscle relaxant) 30 tablet 0    ezetimibe (ZETIA) 10 MG tablet Take 1 tablet (10 mg) by mouth daily 90 tablet 3    gabapentin (NEURONTIN) 100 MG capsule Take 1 capsule (100 mg) by mouth 3 times daily as needed (nerve pain.  Can increase as tolerated to 300mg three times a day.) 30 capsule 0    metoprolol tartrate (LOPRESSOR) 25 MG tablet Take 1 tablet (25 mg) by mouth 2 times daily 180 tablet 3    nitroGLYcerin (NITROSTAT) 0.4 MG sublingual tablet For chest pain place 1 tablet under the tongue every 5 minutes for 3 doses. If symptoms persist 5 minutes after 2nd dose call 911. 30 tablet 0    olmesartan (BENICAR) 40 MG tablet Take 1 tablet (40 mg) by mouth daily 90 tablet 3    omeprazole (PRILOSEC OTC) 20 MG EC tablet Take 20 mg by mouth daily Dosage is unknown      rosuvastatin (CRESTOR) 40 MG tablet Take 1 tablet (40 mg) by mouth daily Appointment needed for further refills 90 tablet 3    albuterol (PROAIR HFA/PROVENTIL HFA/VENTOLIN HFA) 108 (90 Base) MCG/ACT inhaler Inhale 2 puffs into the lungs every 6 hours as needed for shortness of breath / dyspnea or wheezing (Patient not taking: Reported on 11/7/2023)      famotidine (PEPCID) 20 MG tablet Take 1 tablet (20 mg) by mouth 2 times daily as needed (heartburn, chest discomfort) (Patient not taking: Reported on 11/7/2023) 60 tablet 1    ibuprofen (ADVIL/MOTRIN) 600 MG tablet Take 1 tablet (600 mg) by mouth every 6 hours as needed for  "moderate pain (Patient not taking: Reported on 2023) 30 tablet 0       ALLERGIES     Allergies   Allergen Reactions    Erythromycin     Aminophylline      \"got jittery\"       PAST MEDICAL HISTORY:  Past Medical History:   Diagnosis Date    Acid reflux     Asthma     Hiatal hernia        PAST SURGICAL HISTORY:  Past Surgical History:   Procedure Laterality Date    APPENDECTOMY       SECTION      x5    CV CORONARY ANGIOGRAM N/A 3/26/2021    Procedure: Coronary Angiogram;  Surgeon: Madi Dye MD;  Location:  [facility]    CV HEART CATHETERIZATION WITH POSSIBLE INTERVENTION N/A 2021    Procedure: coronary angiogram;  Surgeon: Madi Dye MD;  Location:  [facility]    CV INSTANTANEOUS WAVE-FREE RATIO N/A 3/26/2021    Procedure: Instantaneous Wave-Free Ratio;  Surgeon: Madi Dye MD;  Location:  [facility]    CV PCI STENT DRUG ELUTING N/A 3/26/2021    Procedure: Percutaneous Coronary Intervention Stent Drug Eluting;  Surgeon: Madi Dye MD;  Location:  [facility]ATH LAB    ORTHOPEDIC SURGERY      TONSILLECTOMY         FAMILY HISTORY:  Family History   Problem Relation Age of Onset    Heart Disease Mother          75yo [place] emphysema smoker    Cancer Father          85yo lung smoker    Family History Negative Sister 32        lung issue    Family History Negative Son     Family History Negative Son     Family History Negative Son     Family History Negative Daughter     Neurologic Disorder Daughter 23        MS    Cancer Maternal Grandfather          age? throat smoker       SOCIAL HISTORY:  Social History     Socioeconomic History    Marital status: Single     Spouse name: None    Number of children: 5    Years of education: None    Highest education level: None   Occupational History     Employer: [employer]   Tobacco Use    Smoking status: Every Day     Packs/day: 0.50     Years: 40.00     Additional " "pack years: 0.00     Total pack years: 20.00     Types: Cigarettes    Smokeless tobacco: Never    Tobacco comments:     Smoker since age 13. 1/2-3/4 ppd.   Substance and Sexual Activity    Alcohol use: No    Drug use: No    Sexual activity: Not Currently       Review of Systems:  Skin:  not assessed       Eyes:  not assessed      ENT:  not assessed      Respiratory:  Negative       Cardiovascular:    Positive for;lightheadedness lightheadedness is seldom when standing quickly  Gastroenterology: not assessed      Genitourinary:  not assessed      Musculoskeletal:  not assessed      Neurologic:  not assessed      Psychiatric:  not assessed      Heme/Lymph/Imm:  not assessed      Endocrine:  not assessed        Physical Exam:  Vitals: BP (!) 166/84   Pulse 68   Ht 1.575 m (5' 2\")   Wt 48.9 kg (107 lb 12.8 oz)   LMP  (LMP Unknown)   BMI 19.72 kg/m      Constitutional:  cooperative thin      Skin:  warm and dry to the touch          Head:  normocephalic        Eyes:  pupils equal and round        Lymph:      ENT:  no pallor or cyanosis        Neck:  JVP normal;no carotid bruit        Respiratory:  clear to auscultation;normal respiratory excursion         Cardiac: regular rhythm;normal S1 and S2     no presence of murmur            pulses below the femoral arteries are diminished                                      GI:  abdomen soft        Extremities and Muscular Skeletal:  no edema              Neurological:  affect appropriate        Psych:  Alert and Oriented x 3          CC  No referring provider defined for this encounter.      Thank you for allowing me to participate in the care of your patient.      Sincerely,     ANGÉLICA Nice CNP     [facility]  "

## 2024-03-05 ENCOUNTER — ALLIED HEALTH/NURSE VISIT (OUTPATIENT)
Dept: CARDIOLOGY | Facility: CLINIC | Age: 64
End: 2024-03-05
Attending: NURSE PRACTITIONER
Payer: COMMERCIAL

## 2024-03-05 ENCOUNTER — DOCUMENTATION ONLY (OUTPATIENT)
Dept: CARDIOLOGY | Facility: CLINIC | Age: 64
End: 2024-03-05

## 2024-03-05 ENCOUNTER — LAB (OUTPATIENT)
Dept: LAB | Facility: CLINIC | Age: 64
End: 2024-03-05
Payer: COMMERCIAL

## 2024-03-05 VITALS — HEART RATE: 68 BPM | SYSTOLIC BLOOD PRESSURE: 149 MMHG | DIASTOLIC BLOOD PRESSURE: 84 MMHG

## 2024-03-05 DIAGNOSIS — I10 BENIGN ESSENTIAL HYPERTENSION: ICD-10-CM

## 2024-03-05 DIAGNOSIS — I10 BENIGN ESSENTIAL HYPERTENSION: Primary | ICD-10-CM

## 2024-03-05 LAB
ANION GAP SERPL CALCULATED.3IONS-SCNC: 13 MMOL/L (ref 7–15)
BUN SERPL-MCNC: 10.3 MG/DL (ref 8–23)
CALCIUM SERPL-MCNC: 9.2 MG/DL (ref 8.8–10.2)
CHLORIDE SERPL-SCNC: 100 MMOL/L (ref 98–107)
CREAT SERPL-MCNC: 0.87 MG/DL (ref 0.51–0.95)
DEPRECATED HCO3 PLAS-SCNC: 21 MMOL/L (ref 22–29)
EGFRCR SERPLBLD CKD-EPI 2021: 74 ML/MIN/1.73M2
GLUCOSE SERPL-MCNC: 102 MG/DL (ref 70–99)
POTASSIUM SERPL-SCNC: 4.6 MMOL/L (ref 3.4–5.3)
SODIUM SERPL-SCNC: 134 MMOL/L (ref 135–145)

## 2024-03-05 PROCEDURE — 99207 PR NO CHARGE LOS: CPT

## 2024-03-05 PROCEDURE — 36415 COLL VENOUS BLD VENIPUNCTURE: CPT | Performed by: NURSE PRACTITIONER

## 2024-03-05 PROCEDURE — 80048 BASIC METABOLIC PNL TOTAL CA: CPT | Performed by: NURSE PRACTITIONER

## 2024-03-05 NOTE — PROGRESS NOTES
ALLIED [facility] BLOOD PRESSURE CHECK     Last office visit: 11/7/23 with Candelaria Garza    Previous blood pressure: 166/84 mm Hg  Previous heart rate: 68 bpm      Time of visit: 12:43    Morning medications were taken at: 2:00 PM and 2:00 AM     Today's blood pressure: 149/84 mm Hg  Today's heart rate: 77 bpm     Home monitor blood pressure: 125/81 mmHg  Home monitor heart rate: N/A bpm      Additional Comments: Pt stated she is under a bit of stress right now as they are waiting for results for her boyfriends cancer testing to come back. She also stated that she gets anxious when getting her BP taken. 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 She did not take her medication before today's BP visit.       Results routed to: [facility]      Ordering Provider: Candelaria Garza   In clinic Provider: JIMMY

## 2024-03-05 NOTE — PROGRESS NOTES
ALLIED HEALTH BLOOD PRESSURE CHECK     Last office visit: 11/7/23 with Candelaria Garza    Previous blood pressure: 166/84 mm Hg  Previous heart rate: 68 bpm      Time of visit: 12:43    Morning medications were taken at: 2:00 PM and 2:00 AM     Today's blood pressure: 149/84 mm Hg  Today's heart rate: 77 bpm     Home monitor blood pressure: 125/81 mmHg  Home monitor heart rate: N/A bpm      Additional Comments: Pt stated she is under a bit of stress right now as they are waiting for results for her boyfriends cancer testing to come back. She also stated that she gets anxious when getting her BP taken. 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 She did not take her medication before today's BP visit.       Results routed to: [facility]      Ordering Provider: Candelaria Garza   In clinic Provider: JIMMY

## 2024-03-06 NOTE — PROGRESS NOTES
Called patient to review recommendations. Pt did not answer. Left a detailed VM stating pt will need to r/s her BP check and to make sure to take her BP meds that day. Scheduling number provided and RN line provided. NUSRAT Magallanes

## 2024-03-07 NOTE — PROGRESS NOTES
Left message on voice mail to reschedule a BP check [phone] scheduling number. Licha Noel RN on 3/7/2024 at 12:31 PM

## 2024-03-15 NOTE — PROGRESS NOTES
Third attempt calling patient to review BP check and to encourage pt to r/s her BP check. Left detailed VMs with instructions and scheduling line. Sending FYI to provider. Pt will need to reach out to schedule BP check. Peyton SILVERIO

## 2024-12-16 ENCOUNTER — TELEPHONE (OUTPATIENT)
Dept: CARDIOLOGY | Facility: CLINIC | Age: 64
End: 2024-12-16
Payer: COMMERCIAL

## 2024-12-16 DIAGNOSIS — I20.0 UNSTABLE ANGINA (H): ICD-10-CM

## 2024-12-16 DIAGNOSIS — I10 BENIGN ESSENTIAL HYPERTENSION: ICD-10-CM

## 2024-12-16 DIAGNOSIS — I25.10 CORONARY ARTERY DISEASE INVOLVING NATIVE CORONARY ARTERY OF NATIVE HEART WITHOUT ANGINA PECTORIS: ICD-10-CM

## 2024-12-16 RX ORDER — ROSUVASTATIN CALCIUM 40 MG/1
40 TABLET, COATED ORAL DAILY
Qty: 90 TABLET | Refills: 0 | Status: SHIPPED | OUTPATIENT
Start: 2024-12-16

## 2024-12-16 RX ORDER — EZETIMIBE 10 MG/1
10 TABLET ORAL DAILY
Qty: 90 TABLET | Refills: 0 | Status: SHIPPED | OUTPATIENT
Start: 2024-12-16

## 2024-12-16 RX ORDER — METOPROLOL TARTRATE 25 MG/1
25 TABLET, FILM COATED ORAL 2 TIMES DAILY
Qty: 180 TABLET | Refills: 0 | Status: SHIPPED | OUTPATIENT
Start: 2024-12-16

## 2024-12-16 RX ORDER — OLMESARTAN MEDOXOMIL 40 MG/1
40 TABLET ORAL DAILY
Qty: 90 TABLET | Refills: 0 | Status: SHIPPED | OUTPATIENT
Start: 2024-12-16 | End: 2024-12-18

## 2024-12-16 RX ORDER — ASPIRIN 81 MG/1
81 TABLET ORAL DAILY
Qty: 90 TABLET | Refills: 0 | Status: SHIPPED | OUTPATIENT
Start: 2024-12-16

## 2024-12-18 RX ORDER — OLMESARTAN MEDOXOMIL 40 MG/1
40 TABLET ORAL DAILY
Qty: 90 TABLET | Refills: 0 | Status: SHIPPED | OUTPATIENT
Start: 2024-12-18

## 2024-12-18 NOTE — TELEPHONE ENCOUNTER
Retail Pharmacy Prior Authorization Team   Phone: [phone]      PA Initiation    Medication: OLMESARTAN MEDOXOMIL 40 MG PO TABS  Insurance Company: [employer] - Phone [phone] Fax [phone]  Pharmacy Filling the Rx[id number] [address] [facility]  Filling Pharmacy Phone: [phone]  Filling Pharmacy Fax: [phone]  Start Date: 12/18/2024    Filled out manual form and faxed to [employer]/ARMIDA

## 2025-03-20 ENCOUNTER — TELEPHONE (OUTPATIENT)
Dept: CARDIOLOGY | Facility: CLINIC | Age: 65
End: 2025-03-20
Payer: COMMERCIAL

## 2025-03-20 NOTE — TELEPHONE ENCOUNTER
Left message with family member for the patient to call back and schedule the following:    Appointment type:  Return Cardiology  Provider:  Candelaria Garza  Return date:  next available  Additional appointment(s) needed:  NA  Additional Notes:  NA  Specialty phone number: [phone]    Florencia Ding on 3/20/2025 at 1:49 PM

## 2025-03-31 DIAGNOSIS — I20.0 UNSTABLE ANGINA (H): ICD-10-CM

## 2025-03-31 RX ORDER — ASPIRIN 81 MG/1
81 TABLET ORAL DAILY
Qty: 90 TABLET | Refills: 0 | Status: SHIPPED | OUTPATIENT
Start: 2025-03-31

## 2025-04-19 DIAGNOSIS — I20.0 UNSTABLE ANGINA (H): ICD-10-CM

## 2025-04-21 ENCOUNTER — TELEPHONE (OUTPATIENT)
Dept: CARDIOLOGY | Facility: CLINIC | Age: 65
End: 2025-04-21
Payer: COMMERCIAL

## 2025-04-21 RX ORDER — METOPROLOL TARTRATE 25 MG/1
25 TABLET, FILM COATED ORAL 2 TIMES DAILY
Qty: 60 TABLET | Refills: 0 | Status: SHIPPED | OUTPATIENT
Start: 2025-04-21

## 2025-04-21 NOTE — TELEPHONE ENCOUNTER
Patient is overdue for follow up. Last seen in 2023. Orders updated. Message sent to scheduling to reach out to patient's family to make visit. Rx for 30 days sent.

## 2025-04-21 NOTE — TELEPHONE ENCOUNTER
FOURTH ATTEMPT:  for the patient to call back and schedule the following:    Appointment type:  Return Cardiology  Provider:  Candelaria Garza  Return date:  routine, next avail  Additional appointment(s) needed:    Additional Notes:  pt hasn't seen an MD since 2021- needs to re-establish with Dr Thurston or another MD  Specialty phone number: [phone]

## (undated) DEVICE — VALVE HEMOSTASIS .096" COPILOT MECH 1003331

## (undated) DEVICE — INTRO GLIDESHEATH SLENDER 6FR 10X45CM 60-1060

## (undated) DEVICE — Device

## (undated) DEVICE — SLEEVE TR BAND RADIAL COMPRESSION DEVICE 24CM TRB24-REG

## (undated) DEVICE — KIT LG BORE TOUHY ACCESS PLUS MAP152

## (undated) DEVICE — CATH DIAGNOSTIC RADIAL 5FR TIG 4.0

## (undated) DEVICE — CATH LAUNCHER 6FR JR 4.0 LA6JR40

## (undated) DEVICE — CATH BALLOON NC EMERGE 2.50X8MM H7493926708250

## (undated) DEVICE — GUIDEWIRE VASC 0.014INX180CM RUNTHROUGH 25-1011

## (undated) DEVICE — KIT HAND CONTROL ANGIOTOUCH ACIST 65CM AT-P65

## (undated) DEVICE — RAD G/W INQWIRE .035X260CM J-TIP EXCHANGE IQ35F260J1O5RS

## (undated) DEVICE — GUIDEWIRE VASC 0.035INX150CM INQWIRE J TIP IQ35F150J3F/A

## (undated) DEVICE — CATH ANGIO INFINITI JR4 4FRX100CM 538421

## (undated) DEVICE — GW VASC OMNIWIRE J L185CM PRESSURE 89185J

## (undated) DEVICE — DEFIB PRO-PADZ LVP LQD GEL ADULT 8900-2105-01

## (undated) DEVICE — MANIFOLD KIT ANGIO AUTOMATED 014613

## (undated) DEVICE — RAD INFLATOR BASIC COMPAK  IN4130

## (undated) DEVICE — CATH BALLOON NC EMERGE 2.50X15MM H7493926715250

## (undated) DEVICE — CATH BALLOON EMERGE 2.0X12MM H7493918912200

## (undated) DEVICE — CATH GUIDING L100 CM OD5 FR LNCHR C